# Patient Record
Sex: FEMALE | Race: BLACK OR AFRICAN AMERICAN | Employment: UNEMPLOYED | URBAN - METROPOLITAN AREA
[De-identification: names, ages, dates, MRNs, and addresses within clinical notes are randomized per-mention and may not be internally consistent; named-entity substitution may affect disease eponyms.]

---

## 2018-11-03 ENCOUNTER — APPOINTMENT (OUTPATIENT)
Dept: ULTRASOUND IMAGING | Age: 43
End: 2018-11-03
Payer: COMMERCIAL

## 2018-11-03 ENCOUNTER — HOSPITAL ENCOUNTER (EMERGENCY)
Age: 43
Discharge: HOME | End: 2018-11-03
Payer: COMMERCIAL

## 2018-11-03 VITALS
WEIGHT: 185 LBS | BODY MASS INDEX: 34.04 KG/M2 | TEMPERATURE: 98.9 F | SYSTOLIC BLOOD PRESSURE: 104 MMHG | RESPIRATION RATE: 16 BRPM | DIASTOLIC BLOOD PRESSURE: 53 MMHG | OXYGEN SATURATION: 96 % | HEART RATE: 99 BPM | HEIGHT: 62 IN

## 2018-11-03 DIAGNOSIS — R10.2 PELVIC PAIN IN FEMALE: ICD-10-CM

## 2018-11-03 DIAGNOSIS — D25.9 UTERINE LEIOMYOMA, UNSPECIFIED LOCATION: Primary | ICD-10-CM

## 2018-11-03 LAB
A/G RATIO: 1.3 (ref 1.1–2.2)
ALBUMIN SERPL-MCNC: 4.4 G/DL (ref 3.4–5)
ALP BLD-CCNC: 97 U/L (ref 40–129)
ALT SERPL-CCNC: 12 U/L (ref 10–40)
ANION GAP SERPL CALCULATED.3IONS-SCNC: 9 MMOL/L (ref 3–16)
AST SERPL-CCNC: 17 U/L (ref 15–37)
BACTERIA: ABNORMAL /HPF
BASOPHILS ABSOLUTE: 0.1 K/UL (ref 0–0.2)
BASOPHILS RELATIVE PERCENT: 0.8 %
BILIRUB SERPL-MCNC: 0.4 MG/DL (ref 0–1)
BILIRUBIN URINE: NEGATIVE
BLOOD, URINE: ABNORMAL
BUN BLDV-MCNC: 14 MG/DL (ref 7–20)
CALCIUM SERPL-MCNC: 8.9 MG/DL (ref 8.3–10.6)
CHLORIDE BLD-SCNC: 102 MMOL/L (ref 99–110)
CLARITY: ABNORMAL
CO2: 26 MMOL/L (ref 21–32)
COLOR: YELLOW
CREAT SERPL-MCNC: 0.6 MG/DL (ref 0.6–1.1)
EOSINOPHILS ABSOLUTE: 0.1 K/UL (ref 0–0.6)
EOSINOPHILS RELATIVE PERCENT: 1.4 %
EPITHELIAL CELLS, UA: 7 /HPF (ref 0–5)
GFR AFRICAN AMERICAN: >60
GFR NON-AFRICAN AMERICAN: >60
GLOBULIN: 3.5 G/DL
GLUCOSE BLD-MCNC: 110 MG/DL (ref 70–99)
GLUCOSE URINE: NEGATIVE MG/DL
HCG QUALITATIVE: NEGATIVE
HCG(URINE) PREGNANCY TEST: NEGATIVE
HCT VFR BLD CALC: 33.2 % (ref 36–48)
HEMOGLOBIN: 10.9 G/DL (ref 12–16)
HYALINE CASTS: 1 /LPF (ref 0–8)
KETONES, URINE: NEGATIVE MG/DL
LEUKOCYTE ESTERASE, URINE: ABNORMAL
LIPASE: 16 U/L (ref 13–60)
LYMPHOCYTES ABSOLUTE: 0.8 K/UL (ref 1–5.1)
LYMPHOCYTES RELATIVE PERCENT: 10.9 %
MCH RBC QN AUTO: 30.9 PG (ref 26–34)
MCHC RBC AUTO-ENTMCNC: 32.9 G/DL (ref 31–36)
MCV RBC AUTO: 94.1 FL (ref 80–100)
MICROSCOPIC EXAMINATION: YES
MONOCYTES ABSOLUTE: 0.4 K/UL (ref 0–1.3)
MONOCYTES RELATIVE PERCENT: 5.8 %
NEUTROPHILS ABSOLUTE: 6 K/UL (ref 1.7–7.7)
NEUTROPHILS RELATIVE PERCENT: 81.1 %
NITRITE, URINE: NEGATIVE
PDW BLD-RTO: 15.1 % (ref 12.4–15.4)
PH UA: 7
PLATELET # BLD: 251 K/UL (ref 135–450)
PMV BLD AUTO: 8 FL (ref 5–10.5)
POTASSIUM SERPL-SCNC: 3.7 MMOL/L (ref 3.5–5.1)
PROTEIN UA: NEGATIVE MG/DL
RBC # BLD: 3.53 M/UL (ref 4–5.2)
RBC UA: 5 /HPF (ref 0–4)
SODIUM BLD-SCNC: 137 MMOL/L (ref 136–145)
SPECIFIC GRAVITY UA: 1.02
TOTAL PROTEIN: 7.9 G/DL (ref 6.4–8.2)
URINE REFLEX TO CULTURE: YES
URINE TYPE: ABNORMAL
UROBILINOGEN, URINE: 0.2 E.U./DL
WBC # BLD: 7.4 K/UL (ref 4–11)
WBC UA: 4 /HPF (ref 0–5)

## 2018-11-03 PROCEDURE — 96375 TX/PRO/DX INJ NEW DRUG ADDON: CPT

## 2018-11-03 PROCEDURE — 84703 CHORIONIC GONADOTROPIN ASSAY: CPT

## 2018-11-03 PROCEDURE — 93975 VASCULAR STUDY: CPT

## 2018-11-03 PROCEDURE — 87086 URINE CULTURE/COLONY COUNT: CPT

## 2018-11-03 PROCEDURE — 76830 TRANSVAGINAL US NON-OB: CPT

## 2018-11-03 PROCEDURE — 99284 EMERGENCY DEPT VISIT MOD MDM: CPT

## 2018-11-03 PROCEDURE — 85025 COMPLETE CBC W/AUTO DIFF WBC: CPT

## 2018-11-03 PROCEDURE — 6360000002 HC RX W HCPCS: Performed by: PHYSICIAN ASSISTANT

## 2018-11-03 PROCEDURE — 96374 THER/PROPH/DIAG INJ IV PUSH: CPT

## 2018-11-03 PROCEDURE — 81001 URINALYSIS AUTO W/SCOPE: CPT

## 2018-11-03 PROCEDURE — 83690 ASSAY OF LIPASE: CPT

## 2018-11-03 PROCEDURE — 80053 COMPREHEN METABOLIC PANEL: CPT

## 2018-11-03 RX ORDER — ONDANSETRON 4 MG/1
4-8 TABLET, ORALLY DISINTEGRATING ORAL EVERY 12 HOURS PRN
Qty: 12 TABLET | Refills: 0 | Status: SHIPPED | OUTPATIENT
Start: 2018-11-03 | End: 2021-06-01

## 2018-11-03 RX ORDER — MORPHINE SULFATE 4 MG/ML
4 INJECTION, SOLUTION INTRAMUSCULAR; INTRAVENOUS ONCE
Status: COMPLETED | OUTPATIENT
Start: 2018-11-03 | End: 2018-11-03

## 2018-11-03 RX ORDER — ONDANSETRON 2 MG/ML
4 INJECTION INTRAMUSCULAR; INTRAVENOUS ONCE
Status: COMPLETED | OUTPATIENT
Start: 2018-11-03 | End: 2018-11-03

## 2018-11-03 RX ORDER — DICYCLOMINE HYDROCHLORIDE 10 MG/1
10 CAPSULE ORAL EVERY 6 HOURS PRN
Qty: 20 CAPSULE | Refills: 0 | Status: SHIPPED | OUTPATIENT
Start: 2018-11-03 | End: 2021-06-01

## 2018-11-03 RX ADMIN — MORPHINE SULFATE 4 MG: 4 INJECTION INTRAVENOUS at 03:41

## 2018-11-03 RX ADMIN — ONDANSETRON 4 MG: 2 INJECTION INTRAMUSCULAR; INTRAVENOUS at 03:41

## 2018-11-03 ASSESSMENT — ENCOUNTER SYMPTOMS
DIARRHEA: 0
CONSTIPATION: 0
SHORTNESS OF BREATH: 0
VOMITING: 0
ABDOMINAL PAIN: 1
NAUSEA: 1
SORE THROAT: 0
RHINORRHEA: 0
BLOOD IN STOOL: 0

## 2018-11-03 ASSESSMENT — PAIN SCALES - GENERAL
PAINLEVEL_OUTOF10: 7
PAINLEVEL_OUTOF10: 8

## 2018-11-03 NOTE — ED PROVIDER NOTES
All other systems reviewed and are negative. Positives and Pertinent negatives as per HPI. Except as noted above in the ROS, all other systems were reviewed and negative. PAST MEDICAL HISTORY     Past Medical History:   Diagnosis Date    Anemia     Asthma     no attack since teenager no meds         SURGICAL HISTORY       Past Surgical History:   Procedure Laterality Date     SECTION, LOW TRANSVERSE N/A     EYE SURGERY      TUBAL LIGATION      reversal     TUBAL LIGATION      TL reversed in          CURRENT MEDICATIONS       Previous Medications    FERROUS GLUCONATE (FERGON) 324 (38 FE) MG TABLET    Take 324 mg by mouth daily (with breakfast). IBUPROFEN (ADVIL;MOTRIN) 800 MG TABLET    Take 1 tablet by mouth every 6 hours as needed for Pain. NAPROXEN (NAPROSYN) 500 MG TABLET    Take 1 tablet by mouth 2 times daily for 20 doses    PRENATAL VIT-FE FUMARATE-FA (PRENATAL 1 PLUS 1) 65-1 MG TABS    Take  by mouth.            ALLERGIES     Shellfish-derived products and Penicillins    FAMILY HISTORY       Family History   Problem Relation Age of Onset    Early Death Mother     High Blood Pressure Mother     Substance Abuse Mother     Cancer Father     Substance Abuse Father           SOCIAL HISTORY       Social History     Social History    Marital status:      Spouse name: N/A    Number of children: N/A    Years of education: N/A     Social History Main Topics    Smoking status: Never Smoker    Smokeless tobacco: Never Used    Alcohol use No    Drug use: No    Sexual activity: Yes     Partners: Male     Other Topics Concern    None     Social History Narrative    None       SCREENINGS             PHYSICAL EXAM  (up to 7 for level 4, 8 or more for level 5)     ED Triage Vitals [18 0142]   BP Temp Temp Source Pulse Resp SpO2 Height Weight   (!) 149/98 98.9 °F (37.2 °C) Oral 101 18 100 % 5' 2\" (1.575 m) 185 lb (83.9 kg)       Physical Exam

## 2018-11-04 LAB — URINE CULTURE, ROUTINE: NORMAL

## 2021-04-21 LAB
HPV COMMENT: NORMAL
HPV TYPE 16: NOT DETECTED
HPV TYPE 18: NOT DETECTED
HPVOH (OTHER TYPES): NOT DETECTED

## 2021-05-14 ENCOUNTER — APPOINTMENT (OUTPATIENT)
Dept: CT IMAGING | Age: 46
End: 2021-05-14
Payer: COMMERCIAL

## 2021-05-14 ENCOUNTER — HOSPITAL ENCOUNTER (OUTPATIENT)
Age: 46
Setting detail: OBSERVATION
Discharge: HOME | End: 2021-05-16
Attending: EMERGENCY MEDICINE | Admitting: INTERNAL MEDICINE
Payer: COMMERCIAL

## 2021-05-14 DIAGNOSIS — R06.00 DYSPNEA, UNSPECIFIED TYPE: ICD-10-CM

## 2021-05-14 DIAGNOSIS — I16.0 HYPERTENSIVE URGENCY: Primary | ICD-10-CM

## 2021-05-14 DIAGNOSIS — E87.6 HYPOKALEMIA: ICD-10-CM

## 2021-05-14 DIAGNOSIS — R93.89 ABNORMAL CT OF THE CHEST: ICD-10-CM

## 2021-05-14 DIAGNOSIS — E83.42 HYPOMAGNESEMIA: ICD-10-CM

## 2021-05-14 LAB
A/G RATIO: 1.6 (ref 1.1–2.2)
ALBUMIN SERPL-MCNC: 4.3 G/DL (ref 3.4–5)
ALP BLD-CCNC: 88 U/L (ref 40–129)
ALT SERPL-CCNC: 16 U/L (ref 10–40)
ANION GAP SERPL CALCULATED.3IONS-SCNC: 13 MMOL/L (ref 3–16)
AST SERPL-CCNC: 22 U/L (ref 15–37)
BASOPHILS ABSOLUTE: 0 K/UL (ref 0–0.2)
BASOPHILS RELATIVE PERCENT: 0.9 %
BILIRUB SERPL-MCNC: 0.5 MG/DL (ref 0–1)
BUN BLDV-MCNC: 12 MG/DL (ref 7–20)
CALCIUM SERPL-MCNC: 8.9 MG/DL (ref 8.3–10.6)
CHLORIDE BLD-SCNC: 100 MMOL/L (ref 99–110)
CO2: 24 MMOL/L (ref 21–32)
CREAT SERPL-MCNC: 0.7 MG/DL (ref 0.6–1.1)
EOSINOPHILS ABSOLUTE: 0.1 K/UL (ref 0–0.6)
EOSINOPHILS RELATIVE PERCENT: 1.3 %
GFR AFRICAN AMERICAN: >60
GFR NON-AFRICAN AMERICAN: >60
GLOBULIN: 2.7 G/DL
GLUCOSE BLD-MCNC: 107 MG/DL (ref 70–99)
HCT VFR BLD CALC: 35 % (ref 36–48)
HEMOGLOBIN: 11.5 G/DL (ref 12–16)
LACTIC ACID, SEPSIS: 1.3 MMOL/L (ref 0.4–1.9)
LYMPHOCYTES ABSOLUTE: 1 K/UL (ref 1–5.1)
LYMPHOCYTES RELATIVE PERCENT: 23.7 %
MAGNESIUM: 1.6 MG/DL (ref 1.8–2.4)
MCH RBC QN AUTO: 32.6 PG (ref 26–34)
MCHC RBC AUTO-ENTMCNC: 32.9 G/DL (ref 31–36)
MCV RBC AUTO: 98.9 FL (ref 80–100)
MONOCYTES ABSOLUTE: 0.3 K/UL (ref 0–1.3)
MONOCYTES RELATIVE PERCENT: 6.2 %
NEUTROPHILS ABSOLUTE: 2.8 K/UL (ref 1.7–7.7)
NEUTROPHILS RELATIVE PERCENT: 67.9 %
PDW BLD-RTO: 15.4 % (ref 12.4–15.4)
PLATELET # BLD: 264 K/UL (ref 135–450)
PMV BLD AUTO: 8.9 FL (ref 5–10.5)
POTASSIUM REFLEX MAGNESIUM: 2.6 MMOL/L (ref 3.5–5.1)
PRO-BNP: 1206 PG/ML (ref 0–124)
RBC # BLD: 3.54 M/UL (ref 4–5.2)
SODIUM BLD-SCNC: 137 MMOL/L (ref 136–145)
TOTAL PROTEIN: 7 G/DL (ref 6.4–8.2)
TROPONIN: <0.01 NG/ML
WBC # BLD: 4.1 K/UL (ref 4–11)

## 2021-05-14 PROCEDURE — 93005 ELECTROCARDIOGRAM TRACING: CPT | Performed by: EMERGENCY MEDICINE

## 2021-05-14 PROCEDURE — 36415 COLL VENOUS BLD VENIPUNCTURE: CPT

## 2021-05-14 PROCEDURE — 6360000002 HC RX W HCPCS: Performed by: EMERGENCY MEDICINE

## 2021-05-14 PROCEDURE — 6370000000 HC RX 637 (ALT 250 FOR IP): Performed by: PHYSICIAN ASSISTANT

## 2021-05-14 PROCEDURE — 94640 AIRWAY INHALATION TREATMENT: CPT

## 2021-05-14 PROCEDURE — 83735 ASSAY OF MAGNESIUM: CPT

## 2021-05-14 PROCEDURE — 6360000002 HC RX W HCPCS: Performed by: PHYSICIAN ASSISTANT

## 2021-05-14 PROCEDURE — 6360000004 HC RX CONTRAST MEDICATION: Performed by: PHYSICIAN ASSISTANT

## 2021-05-14 PROCEDURE — 96365 THER/PROPH/DIAG IV INF INIT: CPT

## 2021-05-14 PROCEDURE — 2500000003 HC RX 250 WO HCPCS: Performed by: EMERGENCY MEDICINE

## 2021-05-14 PROCEDURE — 80053 COMPREHEN METABOLIC PANEL: CPT

## 2021-05-14 PROCEDURE — 83605 ASSAY OF LACTIC ACID: CPT

## 2021-05-14 PROCEDURE — 71260 CT THORAX DX C+: CPT

## 2021-05-14 PROCEDURE — 96375 TX/PRO/DX INJ NEW DRUG ADDON: CPT

## 2021-05-14 PROCEDURE — 85025 COMPLETE CBC W/AUTO DIFF WBC: CPT

## 2021-05-14 PROCEDURE — 70450 CT HEAD/BRAIN W/O DYE: CPT

## 2021-05-14 PROCEDURE — 83880 ASSAY OF NATRIURETIC PEPTIDE: CPT

## 2021-05-14 PROCEDURE — 84484 ASSAY OF TROPONIN QUANT: CPT

## 2021-05-14 PROCEDURE — 99283 EMERGENCY DEPT VISIT LOW MDM: CPT

## 2021-05-14 PROCEDURE — 70496 CT ANGIOGRAPHY HEAD: CPT

## 2021-05-14 PROCEDURE — 84145 PROCALCITONIN (PCT): CPT

## 2021-05-14 RX ORDER — LORAZEPAM 2 MG/ML
1 INJECTION INTRAMUSCULAR ONCE
Status: COMPLETED | OUTPATIENT
Start: 2021-05-14 | End: 2021-05-14

## 2021-05-14 RX ORDER — LABETALOL HYDROCHLORIDE 5 MG/ML
10 INJECTION, SOLUTION INTRAVENOUS ONCE
Status: COMPLETED | OUTPATIENT
Start: 2021-05-14 | End: 2021-05-14

## 2021-05-14 RX ORDER — POTASSIUM CHLORIDE 20 MEQ/1
40 TABLET, EXTENDED RELEASE ORAL 2 TIMES DAILY WITH MEALS
Status: DISCONTINUED | OUTPATIENT
Start: 2021-05-15 | End: 2021-05-14

## 2021-05-14 RX ORDER — LEVALBUTEROL INHALATION SOLUTION 1.25 MG/3ML
1.25 SOLUTION RESPIRATORY (INHALATION) EVERY 8 HOURS PRN
Status: DISCONTINUED | OUTPATIENT
Start: 2021-05-14 | End: 2021-05-16 | Stop reason: HOSPADM

## 2021-05-14 RX ORDER — SODIUM CHLORIDE 9 MG/ML
INJECTION, SOLUTION INTRAVENOUS
Status: DISPENSED
Start: 2021-05-14 | End: 2021-05-15

## 2021-05-14 RX ORDER — IPRATROPIUM BROMIDE AND ALBUTEROL SULFATE 2.5; .5 MG/3ML; MG/3ML
1 SOLUTION RESPIRATORY (INHALATION) ONCE
Status: DISCONTINUED | OUTPATIENT
Start: 2021-05-14 | End: 2021-05-14

## 2021-05-14 RX ORDER — POTASSIUM CHLORIDE 7.45 MG/ML
10 INJECTION INTRAVENOUS ONCE
Status: COMPLETED | OUTPATIENT
Start: 2021-05-14 | End: 2021-05-15

## 2021-05-14 RX ORDER — POTASSIUM CHLORIDE 20 MEQ/1
40 TABLET, EXTENDED RELEASE ORAL ONCE
Status: DISCONTINUED | OUTPATIENT
Start: 2021-05-14 | End: 2021-05-16 | Stop reason: HOSPADM

## 2021-05-14 RX ADMIN — LEVALBUTEROL HYDROCHLORIDE 1.25 MG: 1.25 SOLUTION RESPIRATORY (INHALATION) at 20:31

## 2021-05-14 RX ADMIN — LABETALOL HYDROCHLORIDE 10 MG: 5 INJECTION INTRAVENOUS at 23:38

## 2021-05-14 RX ADMIN — POTASSIUM CHLORIDE 10 MEQ: 7.46 INJECTION, SOLUTION INTRAVENOUS at 22:38

## 2021-05-14 RX ADMIN — LORAZEPAM 1 MG: 2 INJECTION INTRAMUSCULAR; INTRAVENOUS at 20:42

## 2021-05-14 RX ADMIN — IOPAMIDOL 120 ML: 755 INJECTION, SOLUTION INTRAVENOUS at 21:26

## 2021-05-14 ASSESSMENT — ENCOUNTER SYMPTOMS
ABDOMINAL PAIN: 0
NAUSEA: 0
SHORTNESS OF BREATH: 1
VOMITING: 0

## 2021-05-14 NOTE — Clinical Note
Patient Class: Inpatient [101]   REQUIRED: Diagnosis: Acute respiratory distress [434187]   Estimated Length of Stay: Estimated stay of more than 2 midnights   Telemetry/Cardiac Monitoring Required?: Yes

## 2021-05-14 NOTE — ED NOTES
Bed: 17  Expected date:   Expected time:   Means of arrival:   Comments:  [address], [facility]  05/14/21 1958

## 2021-05-15 PROBLEM — R06.03 ACUTE RESPIRATORY DISTRESS: Status: ACTIVE | Noted: 2021-05-15

## 2021-05-15 LAB
ALBUMIN SERPL-MCNC: 4.2 G/DL (ref 3.4–5)
ANION GAP SERPL CALCULATED.3IONS-SCNC: 13 MMOL/L (ref 3–16)
BUN BLDV-MCNC: 8 MG/DL (ref 7–20)
CALCIUM SERPL-MCNC: 9.5 MG/DL (ref 8.3–10.6)
CHLORIDE BLD-SCNC: 99 MMOL/L (ref 99–110)
CO2: 24 MMOL/L (ref 21–32)
CREAT SERPL-MCNC: 0.6 MG/DL (ref 0.6–1.1)
EKG ATRIAL RATE: 130 BPM
EKG DIAGNOSIS: NORMAL
EKG P-R INTERVAL: 104 MS
EKG Q-T INTERVAL: 398 MS
EKG QRS DURATION: 86 MS
EKG QTC CALCULATION (BAZETT): 585 MS
EKG R AXIS: -13 DEGREES
EKG T AXIS: 35 DEGREES
EKG VENTRICULAR RATE: 130 BPM
GFR AFRICAN AMERICAN: >60
GFR NON-AFRICAN AMERICAN: >60
GLUCOSE BLD-MCNC: 168 MG/DL (ref 70–99)
MAGNESIUM: 2.1 MG/DL (ref 1.8–2.4)
PHOSPHORUS: 2.4 MG/DL (ref 2.5–4.9)
POTASSIUM SERPL-SCNC: 3.7 MMOL/L (ref 3.5–5.1)
PROCALCITONIN: 0.04 NG/ML (ref 0–0.15)
SODIUM BLD-SCNC: 136 MMOL/L (ref 136–145)

## 2021-05-15 PROCEDURE — 96366 THER/PROPH/DIAG IV INF ADDON: CPT

## 2021-05-15 PROCEDURE — G0378 HOSPITAL OBSERVATION PER HR: HCPCS

## 2021-05-15 PROCEDURE — 36415 COLL VENOUS BLD VENIPUNCTURE: CPT

## 2021-05-15 PROCEDURE — 6370000000 HC RX 637 (ALT 250 FOR IP): Performed by: INTERNAL MEDICINE

## 2021-05-15 PROCEDURE — 96372 THER/PROPH/DIAG INJ SC/IM: CPT

## 2021-05-15 PROCEDURE — 83735 ASSAY OF MAGNESIUM: CPT

## 2021-05-15 PROCEDURE — U0005 INFEC AGEN DETEC AMPLI PROBE: HCPCS

## 2021-05-15 PROCEDURE — 6370000000 HC RX 637 (ALT 250 FOR IP): Performed by: NURSE PRACTITIONER

## 2021-05-15 PROCEDURE — 93010 ELECTROCARDIOGRAM REPORT: CPT | Performed by: INTERNAL MEDICINE

## 2021-05-15 PROCEDURE — 6370000000 HC RX 637 (ALT 250 FOR IP): Performed by: PHYSICIAN ASSISTANT

## 2021-05-15 PROCEDURE — 96376 TX/PRO/DX INJ SAME DRUG ADON: CPT

## 2021-05-15 PROCEDURE — 94761 N-INVAS EAR/PLS OXIMETRY MLT: CPT

## 2021-05-15 PROCEDURE — 6360000002 HC RX W HCPCS: Performed by: INTERNAL MEDICINE

## 2021-05-15 PROCEDURE — U0003 INFECTIOUS AGENT DETECTION BY NUCLEIC ACID (DNA OR RNA); SEVERE ACUTE RESPIRATORY SYNDROME CORONAVIRUS 2 (SARS-COV-2) (CORONAVIRUS DISEASE [COVID-19]), AMPLIFIED PROBE TECHNIQUE, MAKING USE OF HIGH THROUGHPUT TECHNOLOGIES AS DESCRIBED BY CMS-2020-01-R: HCPCS

## 2021-05-15 PROCEDURE — 96367 TX/PROPH/DG ADDL SEQ IV INF: CPT

## 2021-05-15 PROCEDURE — 94640 AIRWAY INHALATION TREATMENT: CPT

## 2021-05-15 PROCEDURE — 2580000003 HC RX 258: Performed by: INTERNAL MEDICINE

## 2021-05-15 PROCEDURE — 80069 RENAL FUNCTION PANEL: CPT

## 2021-05-15 PROCEDURE — 96375 TX/PRO/DX INJ NEW DRUG ADDON: CPT

## 2021-05-15 RX ORDER — MECLIZINE HCL 12.5 MG/1
25 TABLET ORAL 3 TIMES DAILY PRN
Status: DISCONTINUED | OUTPATIENT
Start: 2021-05-15 | End: 2021-05-16 | Stop reason: HOSPADM

## 2021-05-15 RX ORDER — ALBUTEROL SULFATE 2.5 MG/3ML
2.5 SOLUTION RESPIRATORY (INHALATION)
Status: DISCONTINUED | OUTPATIENT
Start: 2021-05-15 | End: 2021-05-16

## 2021-05-15 RX ORDER — FERROUS GLUCONATE 324(37.5)
324 TABLET ORAL
Status: DISCONTINUED | OUTPATIENT
Start: 2021-05-15 | End: 2021-05-15

## 2021-05-15 RX ORDER — LANOLIN ALCOHOL/MO/W.PET/CERES
500 CREAM (GRAM) TOPICAL DAILY
Status: DISCONTINUED | OUTPATIENT
Start: 2021-05-15 | End: 2021-05-16 | Stop reason: HOSPADM

## 2021-05-15 RX ORDER — MAGNESIUM SULFATE 1 G/100ML
1000 INJECTION INTRAVENOUS PRN
Status: DISCONTINUED | OUTPATIENT
Start: 2021-05-15 | End: 2021-05-16 | Stop reason: HOSPADM

## 2021-05-15 RX ORDER — ONDANSETRON 2 MG/ML
4 INJECTION INTRAMUSCULAR; INTRAVENOUS EVERY 6 HOURS PRN
Status: CANCELLED | OUTPATIENT
Start: 2021-05-15

## 2021-05-15 RX ORDER — BUPROPION HYDROCHLORIDE 300 MG/1
300 TABLET ORAL EVERY MORNING
COMMUNITY

## 2021-05-15 RX ORDER — PREDNISONE 1 MG/1
5 TABLET ORAL DAILY
Status: ON HOLD | COMMUNITY
End: 2021-05-26 | Stop reason: HOSPADM

## 2021-05-15 RX ORDER — MELOXICAM 15 MG/1
15 TABLET ORAL DAILY
COMMUNITY
End: 2021-07-29 | Stop reason: ALTCHOICE

## 2021-05-15 RX ORDER — POTASSIUM CHLORIDE 20 MEQ/1
40 TABLET, EXTENDED RELEASE ORAL PRN
Status: DISCONTINUED | OUTPATIENT
Start: 2021-05-15 | End: 2021-05-16 | Stop reason: HOSPADM

## 2021-05-15 RX ORDER — ACETAMINOPHEN 650 MG/1
650 SUPPOSITORY RECTAL EVERY 6 HOURS PRN
Status: DISCONTINUED | OUTPATIENT
Start: 2021-05-15 | End: 2021-05-16 | Stop reason: HOSPADM

## 2021-05-15 RX ORDER — HYDROXYCHLOROQUINE SULFATE 200 MG/1
200 TABLET, FILM COATED ORAL 2 TIMES DAILY
COMMUNITY
End: 2021-09-29

## 2021-05-15 RX ORDER — SODIUM CHLORIDE 0.9 % (FLUSH) 0.9 %
5-40 SYRINGE (ML) INJECTION PRN
Status: DISCONTINUED | OUTPATIENT
Start: 2021-05-15 | End: 2021-05-16 | Stop reason: HOSPADM

## 2021-05-15 RX ORDER — LORAZEPAM 0.5 MG/1
0.5 TABLET ORAL EVERY 8 HOURS PRN
COMMUNITY
End: 2021-05-24

## 2021-05-15 RX ORDER — PROMETHAZINE HYDROCHLORIDE 25 MG/1
12.5 TABLET ORAL EVERY 6 HOURS PRN
Status: CANCELLED | OUTPATIENT
Start: 2021-05-15

## 2021-05-15 RX ORDER — LABETALOL HYDROCHLORIDE 5 MG/ML
10 INJECTION, SOLUTION INTRAVENOUS EVERY 4 HOURS PRN
Status: DISCONTINUED | OUTPATIENT
Start: 2021-05-15 | End: 2021-05-15 | Stop reason: SDUPTHER

## 2021-05-15 RX ORDER — METHYLPREDNISOLONE SODIUM SUCCINATE 125 MG/2ML
60 INJECTION, POWDER, LYOPHILIZED, FOR SOLUTION INTRAMUSCULAR; INTRAVENOUS ONCE
Status: COMPLETED | OUTPATIENT
Start: 2021-05-15 | End: 2021-05-15

## 2021-05-15 RX ORDER — DICYCLOMINE HYDROCHLORIDE 10 MG/1
10 CAPSULE ORAL EVERY 6 HOURS PRN
Status: DISCONTINUED | OUTPATIENT
Start: 2021-05-15 | End: 2021-05-16 | Stop reason: HOSPADM

## 2021-05-15 RX ORDER — VITAMIN B COMPLEX
1000 TABLET ORAL DAILY
Status: DISCONTINUED | OUTPATIENT
Start: 2021-05-15 | End: 2021-05-16 | Stop reason: HOSPADM

## 2021-05-15 RX ORDER — MAGNESIUM SULFATE IN WATER 40 MG/ML
2000 INJECTION, SOLUTION INTRAVENOUS ONCE
Status: COMPLETED | OUTPATIENT
Start: 2021-05-15 | End: 2021-05-15

## 2021-05-15 RX ORDER — HYDROXYCHLOROQUINE SULFATE 200 MG/1
200 TABLET, FILM COATED ORAL 2 TIMES DAILY
Status: DISCONTINUED | OUTPATIENT
Start: 2021-05-15 | End: 2021-05-16 | Stop reason: HOSPADM

## 2021-05-15 RX ORDER — POTASSIUM CHLORIDE 7.45 MG/ML
10 INJECTION INTRAVENOUS
Status: DISPENSED | OUTPATIENT
Start: 2021-05-15 | End: 2021-05-15

## 2021-05-15 RX ORDER — ACETAMINOPHEN 325 MG/1
650 TABLET ORAL EVERY 6 HOURS PRN
Status: DISCONTINUED | OUTPATIENT
Start: 2021-05-15 | End: 2021-05-16 | Stop reason: HOSPADM

## 2021-05-15 RX ORDER — MELOXICAM 7.5 MG/1
15 TABLET ORAL DAILY
Status: DISCONTINUED | OUTPATIENT
Start: 2021-05-15 | End: 2021-05-16 | Stop reason: HOSPADM

## 2021-05-15 RX ORDER — SODIUM CHLORIDE 9 MG/ML
25 INJECTION, SOLUTION INTRAVENOUS PRN
Status: DISCONTINUED | OUTPATIENT
Start: 2021-05-15 | End: 2021-05-16 | Stop reason: HOSPADM

## 2021-05-15 RX ORDER — POLYETHYLENE GLYCOL 3350 17 G/17G
17 POWDER, FOR SOLUTION ORAL DAILY PRN
Status: DISCONTINUED | OUTPATIENT
Start: 2021-05-15 | End: 2021-05-16 | Stop reason: HOSPADM

## 2021-05-15 RX ORDER — LORAZEPAM 0.5 MG/1
0.5 TABLET ORAL EVERY 8 HOURS PRN
Status: DISCONTINUED | OUTPATIENT
Start: 2021-05-15 | End: 2021-05-16 | Stop reason: HOSPADM

## 2021-05-15 RX ORDER — LABETALOL HYDROCHLORIDE 5 MG/ML
10 INJECTION, SOLUTION INTRAVENOUS EVERY 4 HOURS PRN
Status: DISCONTINUED | OUTPATIENT
Start: 2021-05-15 | End: 2021-05-16 | Stop reason: HOSPADM

## 2021-05-15 RX ORDER — ALBUTEROL SULFATE 90 UG/1
2 AEROSOL, METERED RESPIRATORY (INHALATION)
Status: DISCONTINUED | OUTPATIENT
Start: 2021-05-15 | End: 2021-05-16 | Stop reason: HOSPADM

## 2021-05-15 RX ORDER — PRENATAL VIT/IRON FUM/FOLIC AC 27MG-0.8MG
1 TABLET ORAL DAILY
Status: DISCONTINUED | OUTPATIENT
Start: 2021-05-15 | End: 2021-05-15

## 2021-05-15 RX ORDER — BUPROPION HYDROCHLORIDE 300 MG/1
300 TABLET ORAL EVERY MORNING
Status: DISCONTINUED | OUTPATIENT
Start: 2021-05-15 | End: 2021-05-16 | Stop reason: HOSPADM

## 2021-05-15 RX ORDER — CHOLECALCIFEROL (VITAMIN D3) 125 MCG
500 CAPSULE ORAL DAILY
COMMUNITY

## 2021-05-15 RX ORDER — IPRATROPIUM BROMIDE AND ALBUTEROL SULFATE 2.5; .5 MG/3ML; MG/3ML
1 SOLUTION RESPIRATORY (INHALATION)
Status: DISCONTINUED | OUTPATIENT
Start: 2021-05-15 | End: 2021-05-15

## 2021-05-15 RX ORDER — POTASSIUM CHLORIDE 7.45 MG/ML
10 INJECTION INTRAVENOUS PRN
Status: DISCONTINUED | OUTPATIENT
Start: 2021-05-15 | End: 2021-05-16 | Stop reason: HOSPADM

## 2021-05-15 RX ORDER — SODIUM CHLORIDE 0.9 % (FLUSH) 0.9 %
5-40 SYRINGE (ML) INJECTION EVERY 12 HOURS SCHEDULED
Status: DISCONTINUED | OUTPATIENT
Start: 2021-05-15 | End: 2021-05-16 | Stop reason: HOSPADM

## 2021-05-15 RX ADMIN — Medication 1000 UNITS: at 13:26

## 2021-05-15 RX ADMIN — Medication 10 ML: at 20:30

## 2021-05-15 RX ADMIN — Medication 2 PUFF: at 21:16

## 2021-05-15 RX ADMIN — Medication 2 PUFF: at 12:57

## 2021-05-15 RX ADMIN — BENZOCAINE AND MENTHOL 1 LOZENGE: 15; 3.6 LOZENGE ORAL at 17:12

## 2021-05-15 RX ADMIN — Medication 10 MEQ: at 03:30

## 2021-05-15 RX ADMIN — METOPROLOL TARTRATE 25 MG: 25 TABLET, FILM COATED ORAL at 08:33

## 2021-05-15 RX ADMIN — MAGNESIUM SULFATE HEPTAHYDRATE 2000 MG: 40 INJECTION, SOLUTION INTRAVENOUS at 00:35

## 2021-05-15 RX ADMIN — POTASSIUM BICARBONATE 40 MEQ: 782 TABLET, EFFERVESCENT ORAL at 00:32

## 2021-05-15 RX ADMIN — FERROUS GLUCONATE TAB 324 MG (37.5 MG ELEMENTAL IRON) 324 MG: 324 (37.5 FE) TAB at 08:33

## 2021-05-15 RX ADMIN — HYDROXYCHLOROQUINE SULFATE 200 MG: 200 TABLET ORAL at 13:27

## 2021-05-15 RX ADMIN — ACETAMINOPHEN 650 MG: 325 TABLET ORAL at 16:20

## 2021-05-15 RX ADMIN — Medication 2 PUFF: at 16:28

## 2021-05-15 RX ADMIN — POTASSIUM CHLORIDE 10 MEQ: 7.46 INJECTION, SOLUTION INTRAVENOUS at 06:10

## 2021-05-15 RX ADMIN — Medication 2 PUFF: at 21:18

## 2021-05-15 RX ADMIN — METOPROLOL TARTRATE 25 MG: 25 TABLET, FILM COATED ORAL at 20:40

## 2021-05-15 RX ADMIN — LORAZEPAM 0.5 MG: 0.5 TABLET ORAL at 20:40

## 2021-05-15 RX ADMIN — MELOXICAM 15 MG: 7.5 TABLET ORAL at 13:26

## 2021-05-15 RX ADMIN — ENOXAPARIN SODIUM 40 MG: 40 INJECTION SUBCUTANEOUS at 08:33

## 2021-05-15 RX ADMIN — Medication 2 PUFF: at 12:58

## 2021-05-15 RX ADMIN — Medication 500 MCG: at 13:27

## 2021-05-15 RX ADMIN — METHYLPREDNISOLONE SODIUM SUCCINATE 60 MG: 125 INJECTION, POWDER, FOR SOLUTION INTRAMUSCULAR; INTRAVENOUS at 00:32

## 2021-05-15 ASSESSMENT — PAIN DESCRIPTION - PROGRESSION
CLINICAL_PROGRESSION: NOT CHANGED

## 2021-05-15 ASSESSMENT — PAIN DESCRIPTION - LOCATION
LOCATION: RIB CAGE
LOCATION: CHEST

## 2021-05-15 ASSESSMENT — PAIN SCALES - GENERAL
PAINLEVEL_OUTOF10: 6
PAINLEVEL_OUTOF10: 4
PAINLEVEL_OUTOF10: 6
PAINLEVEL_OUTOF10: 4

## 2021-05-15 ASSESSMENT — PAIN DESCRIPTION - DESCRIPTORS: DESCRIPTORS: SHARP

## 2021-05-15 ASSESSMENT — PAIN DESCRIPTION - ORIENTATION: ORIENTATION: RIGHT;LEFT

## 2021-05-15 ASSESSMENT — PAIN DESCRIPTION - FREQUENCY: FREQUENCY: INTERMITTENT

## 2021-05-15 NOTE — PROGRESS NOTES
[facility]    5/15/2021 7:49 AM        Name: Francheska Brennan . Admitted: 5/14/2021  Primary Care Provider: Daron Melendrez MD (Tel: [phone])      Subjective: Ilana Oanh Pt admitted with increased shortness of breath. She has never been hypoxic.  sats currently 100% on RA  Had Covid in March with NO respiratory symptoms. Follows with Rheumatology at [facility] for Lupus.   Is prescribed prednisone, however does not take this medication regularly     Reviewed interval ancillary notes    Current Medications  dicyclomine (BENTYL) capsule 10 mg, Q6H PRN  ferrous gluconate 324 (37.5 Fe) MG tablet 324 mg, Daily with breakfast  prenatal vitamin tablet 1 tablet, Daily  labetalol (NORMODYNE;TRANDATE) injection 10 mg, Q4H PRN  potassium chloride (KLOR-CON M) extended release tablet 40 mEq, PRN   Or  potassium bicarb-citric acid (EFFER-K) effervescent tablet 40 mEq, PRN   Or  potassium chloride 10 mEq/100 mL IVPB (Peripheral Line), PRN  magnesium sulfate 1000 mg in dextrose 5% 100 mL IVPB, PRN  sodium phosphate 13.41 mmol in dextrose 5 % 250 mL IVPB, PRN   Or  sodium phosphate 26.85 mmol in dextrose 5 % 250 mL IVPB, PRN  sodium chloride flush 0.9 % injection 5-40 mL, 2 times per day  sodium chloride flush 0.9 % injection 5-40 mL, PRN  0.9 % sodium chloride infusion, PRN  polyethylene glycol (GLYCOLAX) packet 17 g, Daily PRN  enoxaparin (LOVENOX) injection 40 mg, Daily  acetaminophen (TYLENOL) tablet 650 mg, Q6H PRN   Or  acetaminophen (TYLENOL) suppository 650 mg, Q6H PRN  albuterol (PROVENTIL) nebulizer solution 2.5 mg, Q2H PRN  ipratropium-albuterol (DUONEB) nebulizer solution 1 ampule, Q4H WA  meclizine (ANTIVERT) tablet 25 mg, TID PRN  levalbuterol (XOPENEX) nebulizer solution 1.25 mg, Q8H PRN  sodium chloride 0.9 % infusion,   potassium chloride (KLOR-CON M) extended release tablet 40 mEq, Once        Objective:  BP (!) 138/101   Pulse 107   Temp 97 °F (36.1 °C) (Temporal)   Resp 26   Ht 5' 2\" (1.575 m)   Wt 173 lb 6.4 oz (78.7 kg)   LMP 05/09/2021   SpO2 98%   BMI 31.72 kg/m²   No intake or output data in the 24 hours ending 05/15/21 0749   Wt Readings from Last 3 Encounters:   05/15/21 173 lb 6.4 oz (78.7 kg)   11/03/18 185 lb (83.9 kg)       General appearance:  Appears comfortable, but anxious. She is alert and pleasant   Eyes: Sclera clear. Pupils equal.  ENT: Moist oral mucosa. Trachea midline, no adenopathy. Cardiovascular: Regular rhythm, normal S1, S2. No murmur. No edema in lower extremities  Respiratory: Not using accessory muscles. Good inspiratory effort. Clear to auscultation bilaterally, no wheeze or crackles. GI: Abdomen soft, no tenderness, not distended, normal bowel sounds  Musculoskeletal: No cyanosis in digits, neck supple  Neurology: CN 2-12 grossly intact. No speech or motor deficits  Psych: Normal affect. Alert and oriented in time, place and person  Skin: Warm, dry, normal turgor    Labs and Tests:  CBC:   Recent Labs     05/14/21 2044   WBC 4.1   HGB 11.5*        BMP:    Recent Labs     05/14/21  2044 05/15/21  0817    136   K 2.6* 3.7    99   CO2 24 24   BUN 12 8   CREATININE 0.7 0.6   GLUCOSE 107* 168*     Hepatic:   Recent Labs     05/14/21 2044   AST 22   ALT 16   BILITOT 0.5   ALKPHOS 88       Impression:        Prominent central pulmonary vessels which could be due to pulmonary artery   hypertension with no pulmonary emboli identified. Mildly dilated thoracic aorta with no aneurysm or dissection. Hazy reticulonodular and ground-glass opacities scattered throughout both   lungs which probably represents early multisegmental bronchopneumonia.  This   pattern of pneumonia has been described with COVID-19 disease.  Recommend   correlating with lab values and respiratory precautions     Tiny bibasilar pleural effusions.       1. No acute intracranial abnormality.  Unremarkable noncontrast CT of the head   2. No intracranial aneurysm or large vessel occlusion   3. No significant carotid stenosis or vertebral artery abnormality. 4. Small ground-glass densities within both upper lobes which may represent a   mild infectious/inflammatory process. 5. Mild right hilar adenopathy is partially visualized and is likely reactive     Problem List  Active Problems:    Acute respiratory distress  Resolved Problems:    * No resolved hospital problems. *       Assessment & Plan:   1. Dyspnea:  sats are 100 % on RA:  Symptoms may be related to post covid syndrome, lupus or anxiety. CTPA negative for any PE . Pro calcitonin level is normal. No current indication for any antibiotic therapy. 2. HTN:  I have added low dose BB   3. Lupus:  Resume home meds of plaquenil and mobic,  Was seen by rheumatology on 5/5/21 ,  Notes reviewed. 4. Anxiety:  Resume home medications. 5. Anticipate likely d/c home in the next 24 hours if stable.     6. COVID pending       Diet: DIET GENERAL;  Code:Full Code  DVT PPX      ALEXIS Burkett - CNP   5/15/2021 7:49 AM

## 2021-05-15 NOTE — PROGRESS NOTES
4 Eyes Skin Assessment     NAME:  Jess Dow  YOB: 1975  MEDICAL RECORD NUMBER:  [id number]    The patient is being assess for  Admission    I agree that 2 RN's have performed a thorough Head to Toe Skin Assessment on the patient. ALL assessment sites listed below have been assessed. Areas assessed by both nurses:    Head, Face, Ears, Shoulders, Back, Chest, Arms, Elbows, Hands, Sacrum. Buttock, Coccyx, Ischium and Legs. Feet and Heels        Does the Patient have a Wound?  No noted wound(s)       Landon Prevention initiated:  NA   Wound Care Orders initiated:  NA    Pressure Injury (Stage 3,4, Unstageable, DTI, NWPT, and Complex wounds) if present place consult order under [de-identified] NA    New and Established Ostomies if present place consult order under : NA      Nurse 1 eSignature: Electronically signed by Balwinder Morris RN on 5/15/21 at 6:41 AM EDT    **SHARE this note so that the co-signing nurse is able to place an eSignature**    Nurse 2 eSignature: Electronically signed by Florencia Reese RN on 5/15/21 at 10:24 AM EDT

## 2021-05-15 NOTE — ED PROVIDER NOTES
[facility] Emergency Department      Pt Name: Elesa Cushing  MRN: [id number]  [place] 1975  Date of evaluation: 2021  Provider: Billy Jesus MD  I independently performed a history and physical on Elesa Cushing. All diagnostic, treatment, and disposition decisions were made by myself in conjunction with the advanced practice provider. HPI: Elesa Cushing presented with   Chief Complaint   Patient presents with    Shortness of Breath     pt states hasn't been feeling well and suddenly became sob today. hx of lupus and states \"my lungs are not good\". pt having a hard time talking in between breaths during triage. Elesa Cushing has a past medical history of Anemia and Asthma. She has a past surgical history that includes Tubal ligation; Tubal ligation; eye surgery; and  section, low transverse (N/A, ). No current facility-administered medications on file prior to encounter. Current Outpatient Medications on File Prior to Encounter   Medication Sig Dispense Refill    dicyclomine (BENTYL) 10 MG capsule Take 1 capsule by mouth every 6 hours as needed (cramps) 20 capsule 0    ondansetron (ZOFRAN ODT) 4 MG disintegrating tablet Take 1-2 tablets by mouth every 12 hours as needed for Nausea May Sub regular tablet (non-ODT) if insurance does not cover ODT. 12 tablet 0    ibuprofen (ADVIL;MOTRIN) 800 MG tablet Take 1 tablet by mouth every 6 hours as needed for Pain. 60 tablet 0    Prenatal Vit-Fe Fumarate-FA (PRENATAL 1 PLUS 1) 65-1 MG TABS Take  by mouth.  ferrous gluconate (FERGON) 324 (38 FE) MG tablet Take 324 mg by mouth daily (with breakfast).  naproxen (NAPROSYN) 500 MG tablet Take 1 tablet by mouth 2 times daily for 20 doses 20 tablet 0     PHYSICAL EXAM  Vitals: BP (!) 181/120   Pulse 137   Temp 98.1 °F (36.7 °C) (Infrared)   Resp 27   Wt 185 lb (83.9 kg)   LMP 2021   SpO2 98%   BMI 33.84 kg/m²   Constitutional:  39 y.o. [address] - Abnormal; Notable for the following components:    Pro-BNP 1,206 (*)     All other components within normal limits    Narrative:     Tato Hurdole  [employer] tel. [phone],  Chemistry results called to and read back by MEHRAN Ramirez, 05/14/2021  21:17, by Susan Wood  Performed at:  [facility] [address],  [place], [address]   Phone [phone]   MAGNESIUM - Abnormal; Notable for the following components:    Magnesium 1.60 (*)     All other components within normal limits    Narrative:     Tato [place]  [employer] tel. [phone],  Chemistry results called to and read back by MEHRAN Ramirez, 05/14/2021  21:17, by Susan Wood  Performed at:  [facility] [address],  [place], [address]   Phone [phone]   TROPONIN    Narrative:     Sydneyreji [employer] tel. [phone],  Chemistry results called to and read back by MEHRAN Ramirez, 05/14/2021  21:17, by Susan Wood  Performed at:  [facility] [address],  [place], [address]   Phone [phone]   LACTATE, SEPSIS    Narrative:     Performed at:  [facility] [address],  [place], [address]   Phone [phone]   PROCALCITONIN    Narrative:     Performed at:  [facility] [address],  [place], [address]   Phone [phone]:     Plain x-rays were viewed by me:   CT CHEST PULMONARY EMBOLISM W CONTRAST   Final Result   Prominent central pulmonary vessels which could be due to pulmonary artery   hypertension with no pulmonary emboli identified. Mildly dilated thoracic aorta with no aneurysm or dissection. Hazy reticulonodular and ground-glass opacities scattered throughout both   lungs which probably represents early multisegmental bronchopneumonia.   This   pattern of pneumonia has been described with COVID-19 disease. Recommend   correlating with lab values and respiratory precautions      Tiny bibasilar pleural effusions. CTA HEAD NECK W CONTRAST   Final Result   1. No acute intracranial abnormality. Unremarkable noncontrast CT of the head   2. No intracranial aneurysm or large vessel occlusion   3. No significant carotid stenosis or vertebral artery abnormality. 4. Small ground-glass densities within both upper lobes which may represent a   mild infectious/inflammatory process. 5. Mild right hilar adenopathy is partially visualized and is likely reactive         CT HEAD WO CONTRAST   Final Result   1. No acute intracranial abnormality. Unremarkable noncontrast CT of the head   2. No intracranial aneurysm or large vessel occlusion   3. No significant carotid stenosis or vertebral artery abnormality. 4. Small ground-glass densities within both upper lobes which may represent a   mild infectious/inflammatory process. 5. Mild right hilar adenopathy is partially visualized and is likely reactive           EKG:  Read by me in the absence of a cardiologist shows: Sinus tachycardia, rate 130, short IL, QRS duration normal, axis -13 degrees, nonspecific ST-T wave abnormality, prior study not available    CRITICAL CARE:   Total critical care time of 31 minutes (excludes any time for procedures). This includes but not limited to vital sign monitoring, medication, clinical response to medications, interpretation of diagnostics, review of nursing notes, pertinent record review, discussions about patient condition, consultation time, documentation time. Critical care due to the patient's dyspnea, hypertensive urgency, hypokalemia.     Medications administered:  Medications   levalbuterol (XOPENEX) nebulizer solution 1.25 mg (1.25 mg Nebulization Given 5/14/21 2031)   sodium chloride 0.9 % infusion (  Canceled Entry 5/15/21 0019)   potassium chloride (KLOR-CON M) extended release

## 2021-05-15 NOTE — PROGRESS NOTES
Physical Therapy  Snehal Harding    Pt independent in room, spoke with RN, Leonid Figures. Discharge pt at this time. If status changes please consider evaluations for PT/OT.   Thanks,    Tracey Cantu, [address] [facility], DPT [id number]

## 2021-05-15 NOTE — ED PROVIDER NOTES
[address] [facility]        Pt Name: Francheska Brennan  MRN: [id number]  [place] 1975  Date of evaluation: 5/14/2021  Provider: Aldo Schofield PA-C  PCP: Daron Melendrez MD  Note Started: 8:23 PM EDT        I have seen and evaluated this patient with my supervising physician Khadar Cervantes, *. CHIEF COMPLAINT       Chief Complaint   Patient presents with    Shortness of Breath     pt states hasn't been feeling well and suddenly became sob today. hx of lupus and states \"my lungs are not good\". pt having a hard time talking in between breaths during triage. HISTORY OF PRESENT ILLNESS   (Location, Timing/Onset, Context/Setting, Quality, Duration, Modifying Factors, Severity, Associated Signs and Symptoms)  Note limiting factors. Francheska Brennan is a 39 y.o. female patient presents emergency department for evaluation of chest pressure and shortness of breath. Patient states she has felt winded over the past month. Patient states anytime she would talk too much or have any sort of exertion she becomes short of breath however today become suddenly worse. Patient has a history of lupus and takes Plaquenil daily. Patient has no history of reactive airway and does not use any inhalers. Patient states she has pressure-like chest pain and shortness of breath but does not have any nausea, vomiting, diarrhea, back pain. Patient states she also has a headache which she describes as pressure in the back of her head. Patient has a history of migraines however this does not feel like her typical headache. Patient states she has a strong family history of brain aneurysms. She denies any visual changes. Patient states initially she thought the headache was sinus related but does not have any cold or flulike symptoms, nasal congestion.     Nursing Notes were all reviewed and agreed with or any disagreements were addressed in the HPI. REVIEW OF SYSTEMS    (2-9 systems for level 4, 10 or more for level 5)     Review of Systems   Constitutional: Negative for fatigue and fever. HENT: Negative. Eyes: Negative for visual disturbance. Respiratory: Positive for shortness of breath. Cardiovascular: Positive for chest pain. Gastrointestinal: Negative for abdominal pain, nausea and vomiting. Genitourinary: Negative. Musculoskeletal: Negative. Skin: Negative. Neurological: Positive for headaches. Positives and Pertinent negatives as per HPI. Except as noted above in the ROS, all other systems were reviewed and negative. PAST MEDICAL HISTORY     Past Medical History:   Diagnosis Date    Anemia     Asthma     no attack since teenager no meds         SURGICAL HISTORY     Past Surgical History:   Procedure Laterality Date     SECTION, LOW TRANSVERSE N/A     EYE SURGERY      TUBAL LIGATION      reversal     TUBAL LIGATION      TL reversed in          CURRENTMEDICATIONS       Previous Medications    DICYCLOMINE (BENTYL) 10 MG CAPSULE    Take 1 capsule by mouth every 6 hours as needed (cramps)    FERROUS GLUCONATE (FERGON) 324 (38 FE) MG TABLET    Take 324 mg by mouth daily (with breakfast). IBUPROFEN (ADVIL;MOTRIN) 800 MG TABLET    Take 1 tablet by mouth every 6 hours as needed for Pain. NAPROXEN (NAPROSYN) 500 MG TABLET    Take 1 tablet by mouth 2 times daily for 20 doses    ONDANSETRON (ZOFRAN ODT) 4 MG DISINTEGRATING TABLET    Take 1-2 tablets by mouth every 12 hours as needed for Nausea May Sub regular tablet (non-ODT) if insurance does not cover ODT. PRENATAL VIT-FE FUMARATE-FA (PRENATAL 1 PLUS 1) 65-1 MG TABS    Take  by mouth.            ALLERGIES     Shellfish-derived products and Penicillins    FAMILYHISTORY       Family History   Problem Relation Age of Onset    Early Death Mother     High Blood Pressure Mother     Substance Abuse Mother     Cancer Father     Substance Abuse Father           SOCIAL HISTORY       Social History     Tobacco Use    Smoking status: Never Smoker    Smokeless tobacco: Never Used   Substance Use Topics    Alcohol use: No    Drug use: No       SCREENINGS             PHYSICAL EXAM    (up to 7 for level 4, 8 or more for level 5)     ED Triage Vitals [05/14/21 1947]   BP Temp Temp Source Pulse Resp SpO2 Height Weight   (!) 181/120 98.1 °F (36.7 °C) Infrared 137 27 98 % -- 185 lb (83.9 kg)       Physical Exam  Vitals signs and nursing note reviewed. Constitutional:       General: She is in acute distress. Appearance: She is well-developed. She is not ill-appearing, toxic-appearing or diaphoretic. HENT:      Head: Normocephalic and atraumatic. Nose: Nose normal.   Eyes:      General:         Right eye: No discharge. Left eye: No discharge. Neck:      Musculoskeletal: Normal range of motion and neck supple. Cardiovascular:      Rate and Rhythm: Regular rhythm. Tachycardia present. Heart sounds: Normal heart sounds. No murmur. No gallop. Pulmonary:      Effort: Pulmonary effort is normal. Tachypnea present. No respiratory distress. Breath sounds: Normal breath sounds. No decreased air movement or transmitted upper airway sounds. No decreased breath sounds, wheezing, rhonchi or rales. Comments: Only able to speak 1 word at a time. Abdominal:      Palpations: Abdomen is soft. Musculoskeletal: Normal range of motion. Skin:     General: Skin is warm and dry. Capillary Refill: Capillary refill takes less than 2 seconds. Coloration: Skin is not cyanotic or pale. Neurological:      General: No focal deficit present. Mental Status: She is alert and oriented to person, place, and time.    Psychiatric:         Behavior: Behavior normal.         DIAGNOSTIC RESULTS   LABS:    Labs Reviewed   CBC WITH AUTO DIFFERENTIAL - Abnormal; Notable for the following components:       Result Value    RBC 3.54 (*) Hemoglobin 11.5 (*)     Hematocrit 35.0 (*)     All other components within normal limits    Narrative:     Performed at:  [facility]  [address] [facility], [address]   Phone [phone]   COMPREHENSIVE METABOLIC PANEL W/ REFLEX TO MG FOR LOW K - Abnormal; Notable for the following components:    Potassium reflex Magnesium 2.6 (*)     Glucose 107 (*)     All other components within normal limits    Narrative:     Lorenzo Zimmerman  SFERF tel. [phone],  Chemistry results called to and read back by MEHRAN Andre, 05/14/2021  21:17, by Pat Rosen  Performed at:  [facility] [address] [facility],  [place], [address]   Phone 21  - Abnormal; Notable for the following components:    Pro-BNP 1,206 (*)     All other components within normal limits    Narrative:     Lorenzo Zimmerman  SFERF tel. [phone],  Chemistry results called to and read back by MEHRAN Andre, 05/14/2021  21:17, by Pat Rosen  Performed at:  [facility] [address] [facility], [address]   Phone [phone]   MAGNESIUM - Abnormal; Notable for the following components:    Magnesium 1.60 (*)     All other components within normal limits    Narrative:     Lorenzo Zimmerman  SFERF tel. [phone],  Chemistry results called to and read back by MEHRAN Andre, 05/14/2021  21:17, by Pat Rosen  Performed at:  [facility] [address] [facility], [address]   Phone [phone]   TROPONIN    Narrative:     Lorenzo Zimmerman  SFERF tel. [phone],  Chemistry results called to and read back by MEHRAN Andre, 05/14/2021  21:17, by Pat Rosen  Performed at:  [facility] [address] [facility],  [place], [address]   Phone [phone]   LACTATE, SEPSIS    Narrative:     Performed at:  [facility] [address] [facility],  [place], [address]   Phone [phone]   COVID-19   PROCALCITONIN       All other labs were within normal range or not returned as of this dictation. EKG: All EKG's are interpreted by the Emergency Department Physician in the absence of a cardiologist.  Please see their note for interpretation of EKG. RADIOLOGY:   Non-plain film images such as CT, Ultrasound and MRI are read by the radiologist. Plain radiographic images are visualized and preliminarily interpreted by the ED Provider with the below findings:        Interpretation per the Radiologist below, if available at the time of this note:    CT CHEST PULMONARY EMBOLISM W CONTRAST   Final Result   Prominent central pulmonary vessels which could be due to pulmonary artery   hypertension with no pulmonary emboli identified. Mildly dilated thoracic aorta with no aneurysm or dissection. Hazy reticulonodular and ground-glass opacities scattered throughout both   lungs which probably represents early multisegmental bronchopneumonia. This   pattern of pneumonia has been described with COVID-19 disease. Recommend   correlating with lab values and respiratory precautions      Tiny bibasilar pleural effusions. CTA HEAD NECK W CONTRAST   Final Result   1. No acute intracranial abnormality. Unremarkable noncontrast CT of the head   2. No intracranial aneurysm or large vessel occlusion   3. No significant carotid stenosis or vertebral artery abnormality. 4. Small ground-glass densities within both upper lobes which may represent a   mild infectious/inflammatory process. 5. Mild right hilar adenopathy is partially visualized and is likely reactive         CT HEAD WO CONTRAST   Final Result   1. No acute intracranial abnormality. Unremarkable noncontrast CT of the head   2. No intracranial aneurysm or large vessel occlusion   3. No significant carotid stenosis or vertebral artery abnormality.    4. Small ground-glass densities within both upper lobes which may represent a   mild infectious/inflammatory process. 5. Mild right hilar adenopathy is partially visualized and is likely reactive           No results found. PROCEDURES   Unless otherwise noted below, none     Procedures    CRITICAL CARE TIME   There was a high probability of life-threatening clinical deterioration in the patient's condition requiring my urgent intervention. The total critical care time spent while evaluating and treating this patient was at least 30 minutes. This excludes time spent doing separately billable procedures. This includes time at the bedside, data interpretation, medication management, obtaining critical history from collateral sources if the patient is unable to provide it directly, and physician consultation. Specifics of interventions taken and potentially life-threatening diagnostic considerations are listed in the medical decision making.       CONSULTS:  None      EMERGENCY DEPARTMENT COURSE and DIFFERENTIAL DIAGNOSIS/MDM:   Vitals:    Vitals:    05/14/21 2200 05/14/21 2215 05/14/21 2304 05/14/21 2340   BP: (!) 153/104 (!) 151/100 (!) 153/107 (!) 144/96   Pulse: 107 106 112 108   Resp:       Temp:       TempSrc:       SpO2: 90% 93% 99% 94%   Weight:           Patient was given the following medications:  Medications   levalbuterol (XOPENEX) nebulizer solution 1.25 mg (1.25 mg Nebulization Given 5/14/21 2031)   sodium chloride 0.9 % infusion (has no administration in time range)   potassium chloride (KLOR-CON M) extended release tablet 40 mEq (40 mEq Oral Not Given 5/14/21 2337)   potassium bicarb-citric acid (EFFER-K) effervescent tablet 40 mEq (has no administration in time range)   potassium chloride 10 mEq/100 mL IVPB (Peripheral Line) (has no administration in time range)   magnesium sulfate 2000 mg in 50 mL IVPB premix (has no administration in time range)   LORazepam (ATIVAN) injection 1 mg (1 mg Intravenous Given 5/14/21 2042)   iopamidol (ISOVUE-370) 76 % injection 120 mL (120 mLs Intravenous Given 5/14/21 2126)   potassium chloride 10 mEq/100 mL IVPB (Peripheral Line) (10 mEq Intravenous New Bag 5/14/21 2238)   labetalol (NORMODYNE;TRANDATE) injection 10 mg (10 mg Intravenous Given 5/14/21 2338)           Patient presents emergency department for evaluation of shortness of breath. Patient is alert and oriented but unable to speak more than 1 word at a time. Patient was attempted to be given a Xopenex breathing treatment however she was not able to tolerate this. Patient was given Ativan which did calm her down and she is now breathing easier. Even while tachypneic patient was moving air through her lungs appropriately. She did not have any wheeze, rhonchi or rales. Patient states this has been worsening over the past month but got acutely worse today. Patient states she has pressure-like chest pain. Does not radiate. Laboratory evaluation significant for potassium of 2.6. This is replaced with orally and IV. No leukocytosis. Lactic acid is not elevated. BNP is elevated. Troponin is undetectable. CT of the patient's chest shows prominent central pulmonary vasculature which could represent pulmonary artery hypertension. There is also groundglass opacities scattered throughout both lungs that may be early bronchopneumonia. Patient states she had Covid in March but did not have any respiratory symptoms. Patient states she did fully recover from her symptoms. Patient denies any known recent Covid contacts. Covid test is pending. While patient states she is breathing easier after the Ativan she still has the chest pressure. Patient's blood pressure remained elevated and patient states she does not Take any blood pressure medication at home. Patient was given labetalol which she does bring down her blood pressure. She is still tachycardic although she seems very calm in the bed.   Patient was initially stating that she had a posterior headache however this is now gone. CT of the patient's head and CTA of the patient's head and neck showed no acute abnormalities. Patient will be admitted to the hospitalist for further management. Hospitalist to [facility] accepts the patient for admission. FINAL IMPRESSION      1. Hypertensive urgency    2. Dyspnea, unspecified type    3. Abnormal CT of the chest          DISPOSITION/PLAN   DISPOSITION Decision To Admit 05/14/2021 11:31:12 PM      PATIENT REFERREDTO:  No follow-up provider specified.     DISCHARGE MEDICATIONS:  New Prescriptions    No medications on file       DISCONTINUED MEDICATIONS:  Discontinued Medications    No medications on file              (Please note that portions of this note were completed with a voice recognition program.  Efforts were made to edit the dictations but occasionally words are mis-transcribed.)    Feliz Caraballo PA-C (electronically signed)            Feliz Caraballo PA-C  05/15/21 0003

## 2021-05-15 NOTE — PROGRESS NOTES
Patient arrived to the unit at [facility] during system downtime. Patient transported via wheelchair and noted safely transferring self, ambulated to the bathroom then to bed. Patient alert and oriented x4, c/o pain/discomfort with inspiration and shortness of breath. Patient placed on O2 via n/c 2L. Admission assessment completed and documented, see flow sheet.

## 2021-05-15 NOTE — PLAN OF CARE
Problem: Falls - Risk of:  Goal: Will remain free from falls  Description: Will remain free from falls  5/15/2021 1024 by Robles Orlando RN  Outcome: Ongoing  5/15/2021 0535 by Chelsie Pendleton RN  Outcome: Ongoing  Goal: Absence of physical injury  Description: Absence of physical injury  5/15/2021 1024 by Robles Orlando RN  Outcome: Ongoing  5/15/2021 0535 by Chelsie Pendleton RN  Outcome: Ongoing     Problem: Infection:  Goal: Will remain free from infection  Description: Will remain free from infection  5/15/2021 1024 by Robles Orlando RN  Outcome: Ongoing  5/15/2021 0535 by Chelsie Pendleton RN  Outcome: Ongoing     Problem: Safety:  Goal: Free from accidental physical injury  Description: Free from accidental physical injury  5/15/2021 1024 by Robles Orlando RN  Outcome: Ongoing  5/15/2021 0535 by Chelsie Pendleton RN  Outcome: Ongoing  Goal: Free from intentional harm  Description: Free from intentional harm  5/15/2021 1024 by Robles Orlando RN  Outcome: Ongoing  5/15/2021 0535 by Chelsie Pendleton RN  Outcome: Ongoing     Problem: Daily Care:  Goal: Daily care needs are met  Description: Daily care needs are met  5/15/2021 1024 by Robles Orlando RN  Outcome: Ongoing  5/15/2021 0535 by Chelsie Pendleton RN  Outcome: Ongoing     Problem: Pain:  Goal: Patient's pain/discomfort is manageable  Description: Patient's pain/discomfort is manageable  5/15/2021 1024 by Robles Orlando RN  Outcome: Ongoing  5/15/2021 0535 by Chelsie Pendleton RN  Outcome: Ongoing     Problem: Skin Integrity:  Goal: Skin integrity will stabilize  Description: Skin integrity will stabilize  5/15/2021 1024 by Robles Orlando RN  Outcome: Ongoing  5/15/2021 0535 by Chelsie Pendleton RN  Outcome: Ongoing     Problem: Discharge Planning:  Goal: Patients continuum of care needs are met  Description: Patients continuum of care needs are met  5/15/2021 1024 by Robles Orlando RN  Outcome: Ongoing  5/15/2021 0535 by Darby Cox RN  Outcome: Ongoing     Problem: Breathing Pattern - Ineffective:  Goal: Ability to achieve and maintain a regular respiratory rate will improve  Description: Ability to achieve and maintain a regular respiratory rate will improve  5/15/2021 1024 by Negin Wheat RN  Outcome: Ongoing  5/15/2021 0535 by Daryb Cox RN  Outcome: Ongoing     Problem: Airway Clearance - Ineffective  Goal: Achieve or maintain patent airway  5/15/2021 1024 by Negin Wheat RN  Outcome: Ongoing  5/15/2021 0535 by Darby Cox RN  Outcome: Ongoing     Problem: Gas Exchange - Impaired  Goal: Absence of hypoxia  5/15/2021 1024 by Negin Wheat RN  Outcome: Ongoing  5/15/2021 0535 by Darby Cox RN  Outcome: Ongoing  Goal: Promote optimal lung function  5/15/2021 1024 by Negin Wheat RN  Outcome: Ongoing  5/15/2021 0535 by Darby Cox RN  Outcome: Ongoing     Problem: Breathing Pattern - Ineffective  Goal: Ability to achieve and maintain a regular respiratory rate  5/15/2021 1024 by Negin Wheat RN  Outcome: Ongoing  5/15/2021 0535 by Darby Cox RN  Outcome: Ongoing     Problem:  Body Temperature -  Risk of, Imbalanced  Goal: Ability to maintain a body temperature within defined limits  5/15/2021 1024 by Negin Wheat RN  Outcome: Ongoing  5/15/2021 0535 by Darby Cox RN  Outcome: Ongoing  Goal: Will regain or maintain usual level of consciousness  5/15/2021 1024 by Negin Wheat RN  Outcome: Ongoing  5/15/2021 0535 by Darby Cox RN  Outcome: Ongoing  Goal: Complications related to the disease process, condition or treatment will be avoided or minimized  5/15/2021 1024 by Negin Wheat RN  Outcome: Ongoing  5/15/2021 0535 by Darby Cox RN  Outcome: Ongoing     Problem: Isolation Precautions - Risk of Spread of Infection  Goal: Prevent transmission of infection  5/15/2021 1024 by Lina Sharp

## 2021-05-15 NOTE — PROGRESS NOTES
Occupational Therapy  Richarda Mcburney  Pt independent in room, spoke with RN, Thu Pollard. Discharge pt at this time. If status changes please consider evaluations for PT/OT.   Thanks,  Rosita Gamez, OTR/L [id number]  Darryle Dayhoff, [place], [address] [place] [address]

## 2021-05-15 NOTE — H&P
Hospital Medicine History and Physical    5/15/2021    Date of Admission: 5/15/2021    Date of Service: Pt seen/examined on 5/15/2021 and admitted to observation. Assessment/plan:  1. Acute respiratory distress (without hypoxia), resolved. Possible differential diagnosis includes anxiety (symptoms improved following Ativan in the emergency room). Given history of recent COVID-19 infection, could have long haul from COVID pneumonia. Will give IV Solu-Medrol 60 mg x 1. Continue breathing treatments. Monitor pulse oximeter. So far, not hypoxic. Due to abnormal CT-PE protocol with bilateral groundglass opacity, a repeat COVID-19 test has been sent in the emergency room. She appears clinically stable without wheezes on exam at this time. 2. Multiple electrolyte abnormalities, including hypokalemia (potassium 2.6) and hypomagnesemia (magnesium 1.6). Received 80 mEq of p.o. potassium, 10 mEq of IV potassium in the emergency room; will give additional 40 mEq of IV potassium now. Will also give 2 g of magnesium (for magnesium level of 1.6). Recheck potassium and magnesium levels later this morning. Monitor on telemetry. 3. Hypertensive urgency. Could be sympathetic response to #1 and/or anxiety related. Blood pressure has improved with IV labetalol given in the emergency room. Will continue as needed IV labetalol for now. Monitor vitals closely. 4. Other comorbidities: history of asthma, COVID-19 pneumonia (March 2021), lupus. Activities: Up with assist  Prophylaxis: Subcutaneous Lovenox  Code status: Full code    ==========================================================  Chief complaint:  Chief Complaint   Patient presents with    Shortness of Breath     pt states hasn't been feeling well and suddenly became sob today. hx of lupus and states \"my lungs are not good\". pt having a hard time talking in between breaths during triage. History of Presenting Illness:   This is a pleasant 45 y.o. female with history of asthma, COVID-19 pneumonia (2021), lupus, who presents to the emergency room with complaints of chest tightness, shortness of breath, onset last evening after ambulating up the stairs at home. No cough or fever or chills. On evaluation in the emergency room, she was noted to be tachycardic and tachypneic. She received a dose of Ativan with improvement in tachycardia, hypertensive with blood pressure as high as 181/120 (as it is improved down to 144/96 since receiving a dose of IV labetalol in the ER). CT-head was unremarkable for acute pathology. CT-PE protocol obtained which reveals bilateral groundglass opacities concerning for COVID-19 infection. Potassium is 2.6, magnesium is 1.6. Past Medical History:      Diagnosis Date    Anemia     Asthma     no attack since teenager no meds       Past Surgical History:      Procedure Laterality Date     SECTION, LOW TRANSVERSE N/A     EYE SURGERY      TUBAL LIGATION      reversal     TUBAL LIGATION      TL reversed in        Medications (prior to admission):  Prior to Admission medications    Medication Sig Start Date End Date Taking? Authorizing Provider   dicyclomine (BENTYL) 10 MG capsule Take 1 capsule by mouth every 6 hours as needed (cramps) 11/3/18  Yes ALEXIS Lovell CNP   ondansetron (ZOFRAN ODT) 4 MG disintegrating tablet Take 1-2 tablets by mouth every 12 hours as needed for Nausea May Sub regular tablet (non-ODT) if insurance does not cover ODT. 11/3/18  Yes ALEXIS Lovell CNP   ibuprofen (ADVIL;MOTRIN) 800 MG tablet Take 1 tablet by mouth every 6 hours as needed for Pain. 14  Yes Althea Lepe MD   Prenatal Vit-Fe Fumarate-FA (PRENATAL 1 PLUS 1) 65-1 MG TABS Take  by mouth. Yes Historical Provider, MD   ferrous gluconate (FERGON) 324 (38 FE) MG tablet Take 324 mg by mouth daily (with breakfast).      Yes Historical Provider, MD   naproxen (NAPROSYN) 500 MG tablet Take 1 tablet by mouth 2 times daily for 20 doses 10/6/15 10/16/15  Kate Griffin PA-C       Allergy(ies):  Shellfish-derived products and Penicillins    Social History:  TOBACCO:  reports that she has never smoked. She has never used smokeless tobacco.  ETOH:  reports no history of alcohol use. Family History:      Problem Relation Age of Onset    Early Death Mother     High Blood Pressure Mother     Substance Abuse Mother     Cancer Father     Substance Abuse Father        Review of Systems:  Pertinent positives are listed in HPI. At least 10-point ROS reviewed and were negative. Vitals and physical examination:  BP (!) 144/96   Pulse 108   Temp 98.1 °F (36.7 °C) (Infrared)   Resp 30   Wt 185 lb (83.9 kg)   LMP 05/09/2021   SpO2 94%   BMI 33.84 kg/m²   Gen/overall appearance: Not in acute distress. Alert. Oriented x3. Head: Normocephalic, atraumatic  Eyes: EOMI, good acuity  ENT: Oral mucosa moist  Neck: No JVD, thyromegaly  CVS: Nml S1S2, no MRG. RRR. Pulm: Clear bilaterally. No crackles/wheezes  Gastrointestinal: Soft, NT/ND, +BS  Musculoskeletal: No edema. Warm  Neuro: No focal deficit. Moves extremity spontaneously. Psychiatry: Appropriate affect. Not agitated. Skin: Warm, dry with normal turgor.  No rash  Capillary refill: Brisk,< 3 seconds   Peripheral Pulses: +2 palpable, equal bilaterally       Labs/imaging/EKG:  CBC:   Recent Labs     05/14/21 2044   WBC 4.1   HGB 11.5*        BMP:    Recent Labs     05/14/21 2044      K 2.6*      CO2 24   BUN 12   CREATININE 0.7   GLUCOSE 107*     Hepatic:   Recent Labs     05/14/21 2044   AST 22   ALT 16   BILITOT 0.5   ALKPHOS 88       Ct Head Wo Contrast    Result Date: 5/14/2021  EXAMINATION: CTA OF THE HEAD AND NECK WITH CONTRAST; CT OF THE HEAD WITHOUT CONTRAST 5/14/2021 9:29 pm; 5/14/2021 9:24 pm: TECHNIQUE: CTA of the head and neck was performed with the administration of intravenous contrast. differentiation is maintained. No evidence of mass, mass effect or midline shift. No evidence of hydrocephalus. ORBITS: The visualized portion of the orbits demonstrate no acute abnormality. SINUSES:  The visualized paranasal sinuses and mastoid air cells demonstrate no acute abnormality. SOFT TISSUES/SKULL: No acute abnormality of the visualized skull or soft tissues. 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 CTA NECK: AORTIC ARCH/ARCH VESSELS: No dissection or arterial injury. No significant stenosis of the brachiocephalic or subclavian arteries. CAROTID ARTERIES: No dissection, arterial injury, or hemodynamically significant stenosis by NASCET criteria. VERTEBRAL ARTERIES: No dissection, arterial injury, or significant stenosis. SOFT TISSUES: Scattered small ground-glass densities are identified within both upper lobes which may represent a mild infectious/inflammatory process. Mild right hilar adenopathy is identified likely reactive. The larynx and pharynx are unremarkable. No acute abnormality of the salivary and thyroid glands. BONES: No acute osseous abnormality. CTA HEAD: ANTERIOR CIRCULATION: No significant stenosis of the intracranial internal carotid, anterior cerebral, or middle cerebral arteries. No aneurysm. POSTERIOR CIRCULATION: No significant stenosis of the vertebral, basilar, or posterior cerebral arteries. No aneurysm. OTHER: No dural venous sinus thrombosis on this non-dedicated study     1. No acute intracranial abnormality. Unremarkable noncontrast CT of the head 2. No intracranial aneurysm or large vessel occlusion 3. No significant carotid stenosis or vertebral artery abnormality. 4. Small ground-glass densities within both upper lobes which may represent a mild infectious/inflammatory process.  5. Mild right hilar adenopathy is partially visualized and is likely reactive     Ct Chest Pulmonary Embolism W Contrast    Result Date: 5/14/2021  EXAMINATION: CTA OF THE pulmonary vessels which could be due to pulmonary artery hypertension with no pulmonary emboli identified. Mildly dilated thoracic aorta with no aneurysm or dissection. Hazy reticulonodular and ground-glass opacities scattered throughout both lungs which probably represents early multisegmental bronchopneumonia. This pattern of pneumonia has been described with COVID-19 disease. Recommend correlating with lab values and respiratory precautions Tiny bibasilar pleural effusions. Cta Head Neck W Contrast    Result Date: 5/14/2021  EXAMINATION: CTA OF THE HEAD AND NECK WITH CONTRAST; CT OF THE HEAD WITHOUT CONTRAST 5/14/2021 9:29 pm; 5/14/2021 9:24 pm: TECHNIQUE: CTA of the head and neck was performed with the administration of intravenous contrast. Multiplanar reformatted images are provided for review. MIP images are provided for review. Stenosis of the internal carotid arteries measured using NASCET criteria. Dose modulation, iterative reconstruction, and/or weight based adjustment of the mA/kV was utilized to reduce the radiation dose to as low as reasonably achievable.; CT of the head was performed without the administration of intravenous contrast. Dose modulation, iterative reconstruction, and/or weight based adjustment of the mA/kV was utilized to reduce the radiation dose to as low as reasonably achievable. Noncontrast CT of the head with reconstructed 2-D images are also provided for review. COMPARISON: None. HISTORY: ORDERING SYSTEM PROVIDED HISTORY: atypical headache, patient has lupus, family history of brain aneurysms. TECHNOLOGIST PROVIDED HISTORY: Reason for exam:->atypical headache, patient has lupus, family history of brain aneurysms. Decision Support Exception - unselect if not a suspected or confirmed emergency medical condition->Emergency Medical Condition (MA) Reason for Exam: Atypical headache, patient has lupus, family history of brain aneurysms.  Acuity: Acute Type of Exam: Initial; ORDERING SYSTEM PROVIDED HISTORY: posterior head ache (pressure) TECHNOLOGIST PROVIDED HISTORY: Reason for exam:->posterior head ache (pressure) Has a \"code stroke\" or \"stroke alert\" been called? ->No Decision Support Exception - unselect if not a suspected or confirmed emergency medical condition->Emergency Medical Condition (MA) Is the patient pregnant?->No Reason for Exam: Posterior head ache (pressure). Shortness of Breath (pt states hasn't been feeling well and suddenly became sob today. hx of lupus and states \"my lungs are not good\". pt having a hard time talking in between breaths during triage. ). Acuity: Acute Type of Exam: Initial FINDINGS: CT HEAD: BRAIN/VENTRICLES:  No acute intracranial hemorrhage or extraaxial fluid collection. Grey-white differentiation is maintained. No evidence of mass, mass effect or midline shift. No evidence of hydrocephalus. ORBITS: The visualized portion of the orbits demonstrate no acute abnormality. SINUSES:  The visualized paranasal sinuses and mastoid air cells demonstrate no acute abnormality. SOFT TISSUES/SKULL: No acute abnormality of the visualized skull or soft tissues. 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 CTA NECK: AORTIC ARCH/ARCH VESSELS: No dissection or arterial injury. No significant stenosis of the brachiocephalic or subclavian arteries. CAROTID ARTERIES: No dissection, arterial injury, or hemodynamically significant stenosis by NASCET criteria. VERTEBRAL ARTERIES: No dissection, arterial injury, or significant stenosis. SOFT TISSUES: Scattered small ground-glass densities are identified within both upper lobes which may represent a mild infectious/inflammatory process. Mild right hilar adenopathy is identified likely reactive. The larynx and pharynx are unremarkable. No acute abnormality of the salivary and thyroid glands. BONES: No acute osseous abnormality.  CTA HEAD: ANTERIOR CIRCULATION: No significant stenosis of the intracranial internal carotid, anterior cerebral, or middle cerebral arteries. No aneurysm. POSTERIOR CIRCULATION: No significant stenosis of the vertebral, basilar, or posterior cerebral arteries. No aneurysm. OTHER: No dural venous sinus thrombosis on this non-dedicated study     1. No acute intracranial abnormality. Unremarkable noncontrast CT of the head 2. No intracranial aneurysm or large vessel occlusion 3. No significant carotid stenosis or vertebral artery abnormality. 4. Small ground-glass densities within both upper lobes which may represent a mild infectious/inflammatory process. 5. Mild right hilar adenopathy is partially visualized and is likely reactive       EKG: Sinus tachycardia with short MS interval, rate 130 bpm.  No acute ST/T changes. I reviewed EKG. Discussed with ER provider.       Thank you Az Hernandez MD for the opportunity to be involved in this patient's care.    -----------------------------  Juliana Chowdhury MD  [facility]

## 2021-05-15 NOTE — ED NOTES
Upon entrance to room pt states feels like she is having a panic attack, pt taking short breaths and says the words hmm when exhaling, lungs clear bilaterally, Dr. Brooke Thomas notified, see new orders.       [address], RN  05/14/21 2045

## 2021-05-16 VITALS
HEIGHT: 62 IN | HEART RATE: 102 BPM | DIASTOLIC BLOOD PRESSURE: 111 MMHG | WEIGHT: 190.8 LBS | BODY MASS INDEX: 35.11 KG/M2 | RESPIRATION RATE: 18 BRPM | TEMPERATURE: 97.3 F | OXYGEN SATURATION: 100 % | SYSTOLIC BLOOD PRESSURE: 134 MMHG

## 2021-05-16 LAB
A/G RATIO: 1.4 (ref 1.1–2.2)
ALBUMIN SERPL-MCNC: 3.8 G/DL (ref 3.4–5)
ALP BLD-CCNC: 80 U/L (ref 40–129)
ALT SERPL-CCNC: 22 U/L (ref 10–40)
ANION GAP SERPL CALCULATED.3IONS-SCNC: 10 MMOL/L (ref 3–16)
AST SERPL-CCNC: 25 U/L (ref 15–37)
BASOPHILS ABSOLUTE: 0 K/UL (ref 0–0.2)
BASOPHILS RELATIVE PERCENT: 0.9 %
BILIRUB SERPL-MCNC: 0.5 MG/DL (ref 0–1)
BUN BLDV-MCNC: 13 MG/DL (ref 7–20)
CALCIUM SERPL-MCNC: 8.9 MG/DL (ref 8.3–10.6)
CHLORIDE BLD-SCNC: 100 MMOL/L (ref 99–110)
CO2: 25 MMOL/L (ref 21–32)
CREAT SERPL-MCNC: 0.7 MG/DL (ref 0.6–1.1)
EOSINOPHILS ABSOLUTE: 0 K/UL (ref 0–0.6)
EOSINOPHILS RELATIVE PERCENT: 0.4 %
GFR AFRICAN AMERICAN: >60
GFR NON-AFRICAN AMERICAN: >60
GLOBULIN: 2.7 G/DL
GLUCOSE BLD-MCNC: 134 MG/DL (ref 70–99)
HCT VFR BLD CALC: 34.9 % (ref 36–48)
HEMOGLOBIN: 11.5 G/DL (ref 12–16)
LYMPHOCYTES ABSOLUTE: 1.4 K/UL (ref 1–5.1)
LYMPHOCYTES RELATIVE PERCENT: 23.8 %
MAGNESIUM: 1.7 MG/DL (ref 1.8–2.4)
MCH RBC QN AUTO: 33 PG (ref 26–34)
MCHC RBC AUTO-ENTMCNC: 32.9 G/DL (ref 31–36)
MCV RBC AUTO: 100.3 FL (ref 80–100)
MONOCYTES ABSOLUTE: 0.3 K/UL (ref 0–1.3)
MONOCYTES RELATIVE PERCENT: 6 %
NEUTROPHILS ABSOLUTE: 3.9 K/UL (ref 1.7–7.7)
NEUTROPHILS RELATIVE PERCENT: 68.9 %
PDW BLD-RTO: 15.5 % (ref 12.4–15.4)
PHOSPHORUS: 3.3 MG/DL (ref 2.5–4.9)
PLATELET # BLD: 240 K/UL (ref 135–450)
PMV BLD AUTO: 8.7 FL (ref 5–10.5)
POTASSIUM SERPL-SCNC: 3.3 MMOL/L (ref 3.5–5.1)
RBC # BLD: 3.48 M/UL (ref 4–5.2)
SARS-COV-2, PCR: NOT DETECTED
SODIUM BLD-SCNC: 135 MMOL/L (ref 136–145)
TOTAL PROTEIN: 6.5 G/DL (ref 6.4–8.2)
WBC # BLD: 5.7 K/UL (ref 4–11)

## 2021-05-16 PROCEDURE — G0378 HOSPITAL OBSERVATION PER HR: HCPCS

## 2021-05-16 PROCEDURE — 99220 PR INITIAL OBSERVATION CARE/DAY 70 MINUTES: CPT | Performed by: INTERNAL MEDICINE

## 2021-05-16 PROCEDURE — 36415 COLL VENOUS BLD VENIPUNCTURE: CPT

## 2021-05-16 PROCEDURE — 6370000000 HC RX 637 (ALT 250 FOR IP): Performed by: INTERNAL MEDICINE

## 2021-05-16 PROCEDURE — 6360000002 HC RX W HCPCS: Performed by: INTERNAL MEDICINE

## 2021-05-16 PROCEDURE — 94761 N-INVAS EAR/PLS OXIMETRY MLT: CPT

## 2021-05-16 PROCEDURE — 85025 COMPLETE CBC W/AUTO DIFF WBC: CPT

## 2021-05-16 PROCEDURE — 94640 AIRWAY INHALATION TREATMENT: CPT

## 2021-05-16 PROCEDURE — 83735 ASSAY OF MAGNESIUM: CPT

## 2021-05-16 PROCEDURE — 84100 ASSAY OF PHOSPHORUS: CPT

## 2021-05-16 PROCEDURE — 2580000003 HC RX 258: Performed by: INTERNAL MEDICINE

## 2021-05-16 PROCEDURE — 6370000000 HC RX 637 (ALT 250 FOR IP): Performed by: NURSE PRACTITIONER

## 2021-05-16 PROCEDURE — 80053 COMPREHEN METABOLIC PANEL: CPT

## 2021-05-16 PROCEDURE — 96372 THER/PROPH/DIAG INJ SC/IM: CPT

## 2021-05-16 RX ORDER — ALBUTEROL SULFATE 90 UG/1
2 AEROSOL, METERED RESPIRATORY (INHALATION) EVERY 6 HOURS PRN
Qty: 1 INHALER | Refills: 0 | Status: SHIPPED | OUTPATIENT
Start: 2021-05-16

## 2021-05-16 RX ORDER — ALBUTEROL SULFATE 90 UG/1
2 AEROSOL, METERED RESPIRATORY (INHALATION)
Status: DISCONTINUED | OUTPATIENT
Start: 2021-05-16 | End: 2021-05-16 | Stop reason: HOSPADM

## 2021-05-16 RX ADMIN — ACETAMINOPHEN 650 MG: 325 TABLET ORAL at 04:51

## 2021-05-16 RX ADMIN — HYDROXYCHLOROQUINE SULFATE 200 MG: 200 TABLET ORAL at 08:37

## 2021-05-16 RX ADMIN — Medication 10 ML: at 08:56

## 2021-05-16 RX ADMIN — MELOXICAM 15 MG: 7.5 TABLET ORAL at 08:38

## 2021-05-16 RX ADMIN — BUPROPION HYDROCHLORIDE 300 MG: 300 TABLET, EXTENDED RELEASE ORAL at 08:37

## 2021-05-16 RX ADMIN — Medication 1000 UNITS: at 08:37

## 2021-05-16 RX ADMIN — LORAZEPAM 0.5 MG: 0.5 TABLET ORAL at 04:51

## 2021-05-16 RX ADMIN — ALBUTEROL SULFATE 2 PUFF: 90 AEROSOL, METERED RESPIRATORY (INHALATION) at 03:12

## 2021-05-16 RX ADMIN — METOPROLOL TARTRATE 25 MG: 25 TABLET, FILM COATED ORAL at 08:38

## 2021-05-16 RX ADMIN — POTASSIUM CHLORIDE 40 MEQ: 1500 TABLET, EXTENDED RELEASE ORAL at 08:54

## 2021-05-16 RX ADMIN — ENOXAPARIN SODIUM 40 MG: 40 INJECTION SUBCUTANEOUS at 08:36

## 2021-05-16 RX ADMIN — Medication 500 MCG: at 08:37

## 2021-05-16 RX ADMIN — Medication 2 PUFF: at 09:00

## 2021-05-16 ASSESSMENT — PAIN DESCRIPTION - LOCATION: LOCATION: CHEST

## 2021-05-16 ASSESSMENT — PAIN DESCRIPTION - PROGRESSION
CLINICAL_PROGRESSION: NOT CHANGED

## 2021-05-16 ASSESSMENT — PAIN SCALES - GENERAL
PAINLEVEL_OUTOF10: 8
PAINLEVEL_OUTOF10: 0

## 2021-05-16 ASSESSMENT — PAIN DESCRIPTION - FREQUENCY: FREQUENCY: INTERMITTENT

## 2021-05-16 ASSESSMENT — PAIN DESCRIPTION - PAIN TYPE: TYPE: ACUTE PAIN

## 2021-05-16 NOTE — DISCHARGE SUMMARY
[facility]    Patient Demographics    Patient. Joanna Randall  Date of Birth. 1975  MRN. [id number]     Primary care provider. Edu Valenzuela MD  (Tel: [phone])    Admit date: 5/14/2021    Discharge date 5/16/2021  Note Date: 5/16/2021     Reason for Hospitalization. Chief Complaint   Patient presents with    Shortness of Breath     pt states hasn't been feeling well and suddenly became sob today. hx of lupus and states \"my lungs are not good\". pt having a hard time talking in between breaths during triage. Significant Findings. Active Problems:    Acute respiratory distress  Resolved Problems:    * No resolved hospital problems. *       Problems and results from this hospitalization that need follow up. Follow up with PCP  Outpatient echo added     Significant test results and incidental findings. CTPA:    Impression:         Prominent central pulmonary vessels which could be due to pulmonary artery   hypertension with no pulmonary emboli identified. Mildly dilated thoracic aorta with no aneurysm or dissection. Hazy reticulonodular and ground-glass opacities scattered throughout both   lungs which probably represents early multisegmental bronchopneumonia.  This   pattern of pneumonia has been described with COVID-19 disease.  Recommend   correlating with lab values and respiratory precautions     Tiny bibasilar pleural effusions.       1. No acute intracranial abnormality.  Unremarkable noncontrast CT of the head   2. No intracranial aneurysm or large vessel occlusion   3. No significant carotid stenosis or vertebral artery abnormality. 4. Small ground-glass densities within both upper lobes which may represent a   mild infectious/inflammatory process.    5. Mild right hilar adenopathy is partially visualized and is likely reactive       Invasive procedures and treatments. [facility]. Pt admitted through the ED with increased shortness of breath. She was never noted to be hypoxic.  sats were 98 to 100 % on RA. She had Covid in March , however she did not have any respiratory symptoms. She was admitted and followed by pulmonary . She was treated for the followin. Dyspnea:  sats are 100 % on RA:  Symptoms may be related to post covid syndrome, lupus or anxiety. CTPA negative for any PE . Pro calcitonin level is normal. No current indication for any antibiotic therapy. 2. HTN:  I have added low dose BB with good results   3. Lupus:  Resume home meds of plaquenil and mobic,  Was seen by rheumatology on 21 ,  Notes reviewed. 4. Anxiety:  Pt smoke marijuana daily to help with anxiety. Cessation encouraged. 5. COVID was negative  6. Outpatient echo was ordered at d/c . Pt will call to schedule        Consults. IP CONSULT TO CASE MANAGEMENT  IP CONSULT TO PULMONOLOGY    Physical examination on discharge day. BP (!) 134/111   Pulse 102   Temp 97.3 °F (36.3 °C) (Temporal)   Resp 20   Ht 5' 2\" (1.575 m)   Wt 190 lb 12.8 oz (86.5 kg)   LMP 2021   SpO2 100%   BMI 34.90 kg/m²   General appearance. Alert. Looks comfortable, she is anxious   HEENT. Sclera clear. Moist mucus membranes. Cardiovascular. Regular rate and rhythm, normal S1, S2. No murmur. Respiratory. Not using accessory muscles. Clear to auscultation bilaterally, no wheeze. Gastrointestinal. Abdomen soft, non-tender, not distended, normal bowel sounds  Neurology. Facial symmetry. No speech deficits. Moving all extremities equally. Extremities. No edema in lower extremities. Skin. Warm, dry, normal turgor    Condition at time of discharge stable     Medication instructions provided to patient at discharge.      Medication List      START taking these medications    metoprolol tartrate 25 MG tablet  Commonly known as: LOPRESSOR  Take 1 tablet by mouth 2 times daily        CONTINUE taking these medications    buPROPion 300 MG extended release tablet  Commonly known as: WELLBUTRIN XL     dicyclomine 10 MG capsule  Commonly known as: Bentyl  Take 1 capsule by mouth every 6 hours as needed (cramps)     hydroxychloroquine 200 MG tablet  Commonly known as: PLAQUENIL     LORazepam 0.5 MG tablet  Commonly known as: ATIVAN     meloxicam 15 MG tablet  Commonly known as: MOBIC     ondansetron 4 MG disintegrating tablet  Commonly known as: Zofran ODT  Take 1-2 tablets by mouth every 12 hours as needed for Nausea May Sub regular tablet (non-ODT) if insurance does not cover ODT.     predniSONE 5 MG tablet  Commonly known as: DELTASONE     vitamin B-12 500 MCG tablet  Commonly known as: CYANOCOBALAMIN     vitamin D 25 MCG (1000 UT) Tabs tablet  Commonly known as: CHOLECALCIFEROL        STOP taking these medications   Pt was not taking these meds   ferrous gluconate 324 (38 Fe) MG tablet  Commonly known as: FERGON     ibuprofen 800 MG tablet  Commonly known as: ADVIL;MOTRIN     naproxen 500 MG tablet  Commonly known as: Naprosyn     Prenatal 1 Plus 1 65-1 MG Tabs           Where to Get Your Medications      These medications were sent to [address] [place] [address]    Phone: [phone]   · metoprolol tartrate 25 MG tablet         Discharge recommendations given to patient. Follow Up. in 1 week   Disposition. Home   Activity. As tolerated   Diet: DIET GENERAL;      Spent > 30  minutes in discharge process.     Signed:  ALEXIS Murdock CNP     5/16/2021 8:49 AM

## 2021-05-16 NOTE — PROGRESS NOTES
Pt only complaint is increasing dry cough, declines any intervention at this time. Pt encouraged to call out with an  Needs. Call light within reach, will continue to monitor.

## 2021-05-16 NOTE — PLAN OF CARE
Problem: Falls - Risk of:  Goal: Will remain free from falls  Description: Will remain free from falls  5/15/2021 2246 by Betito Brambila RN  Outcome: Ongoing  5/15/2021 1024 by Fidel Diaz RN  Outcome: Ongoing  Goal: Absence of physical injury  Description: Absence of physical injury  5/15/2021 2246 by Betito Brambila RN  Outcome: Ongoing  5/15/2021 1024 by Fidel Diaz RN  Outcome: Ongoing     Problem: Infection:  Goal: Will remain free from infection  Description: Will remain free from infection  5/15/2021 2246 by Betito Brambila RN  Outcome: Ongoing  5/15/2021 1024 by Fidel Diaz RN  Outcome: Ongoing     Problem: Safety:  Goal: Free from accidental physical injury  Description: Free from accidental physical injury  5/15/2021 2246 by Betito Brambila RN  Outcome: Ongoing  5/15/2021 1024 by Fidel Diaz RN  Outcome: Ongoing  Goal: Free from intentional harm  Description: Free from intentional harm  5/15/2021 2246 by Betito Brambila RN  Outcome: Ongoing  5/15/2021 1024 by Fidel Diaz RN  Outcome: Ongoing     Problem: Daily Care:  Goal: Daily care needs are met  Description: Daily care needs are met  5/15/2021 2246 by Betito Brambila RN  Outcome: Ongoing  5/15/2021 1024 by Fidel Diaz RN  Outcome: Ongoing     Problem: Pain:  Goal: Patient's pain/discomfort is manageable  Description: Patient's pain/discomfort is manageable  5/15/2021 2246 by Betito Brambila RN  Outcome: Ongoing  5/15/2021 1024 by Fidel Diaz RN  Outcome: Ongoing  Goal: Pain level will decrease  Description: Pain level will decrease  Outcome: Ongoing  Goal: Control of acute pain  Description: Control of acute pain  Outcome: Ongoing  Goal: Control of chronic pain  Description: Control of chronic pain  Outcome: Ongoing     Problem: Skin Integrity:  Goal: Skin integrity will stabilize  Description: Skin integrity will stabilize  5/15/2021 2246 by Betito Brambila RN  Outcome: Ongoing  5/15/2021 1024 by Debra Tyson RN  Outcome: Ongoing     Problem: Discharge Planning:  Goal: Patients continuum of care needs are met  Description: Patients continuum of care needs are met  5/15/2021 2246 by Addison Schaumann, RN  Outcome: Ongoing  5/15/2021 1024 by Debra Tyson RN  Outcome: Ongoing     Problem: Breathing Pattern - Ineffective:  Goal: Ability to achieve and maintain a regular respiratory rate will improve  Description: Ability to achieve and maintain a regular respiratory rate will improve  5/15/2021 2246 by Addison Schaumann, RN  Outcome: Ongoing  5/15/2021 1024 by Debra Tyson RN  Outcome: Ongoing     Problem: Airway Clearance - Ineffective  Goal: Achieve or maintain patent airway  5/15/2021 2246 by Addison Schaumann, RN  Outcome: Ongoing  5/15/2021 1024 by Debra Tyson RN  Outcome: Ongoing     Problem: Gas Exchange - Impaired  Goal: Absence of hypoxia  5/15/2021 2246 by Addison Schaumann, RN  Outcome: Ongoing  5/15/2021 1024 by Debra Tyson RN  Outcome: Ongoing  Goal: Promote optimal lung function  5/15/2021 2246 by Addison Schaumann, RN  Outcome: Ongoing  5/15/2021 1024 by Debra Tyson RN  Outcome: Ongoing     Problem: Breathing Pattern - Ineffective  Goal: Ability to achieve and maintain a regular respiratory rate  5/15/2021 2246 by Addison Schaumann, RN  Outcome: Ongoing  5/15/2021 1024 by Debra Tyson RN  Outcome: Ongoing     Problem:  Body Temperature -  Risk of, Imbalanced  Goal: Ability to maintain a body temperature within defined limits  5/15/2021 2246 by Addison Schaumann, RN  Outcome: Ongoing  5/15/2021 1024 by Debra Tyson RN  Outcome: Ongoing  Goal: Will regain or maintain usual level of consciousness  5/15/2021 2246 by Addison Schaumann, RN  Outcome: Ongoing  5/15/2021 1024 by Debra Tyson RN  Outcome: Ongoing  Goal: Complications related to the disease process, condition or treatment will be avoided or minimized  5/15/2021 2246 by Afsaneh Cummings RN  Outcome: Ongoing  5/15/2021 1024 by Kajal Moore RN  Outcome: Ongoing     Problem: Isolation Precautions - Risk of Spread of Infection  Goal: Prevent transmission of infection  5/15/2021 2246 by Afsaneh Cummings RN  Outcome: Ongoing  5/15/2021 1024 by Kajal Moore RN  Outcome: Ongoing     Problem: Nutrition Deficits  Goal: Optimize nutritional status  5/15/2021 2246 by Afsaneh Cummings RN  Outcome: Ongoing  5/15/2021 1024 by Kajal Moore RN  Outcome: Ongoing     Problem: Risk for Fluid Volume Deficit  Goal: Maintain normal heart rhythm  5/15/2021 2246 by Afsaneh Cummings RN  Outcome: Ongoing  5/15/2021 1024 by Kajal Moore RN  Outcome: Ongoing  Goal: Maintain absence of muscle cramping  5/15/2021 2246 by Afsaneh Cummings RN  Outcome: Ongoing  5/15/2021 1024 by Kajal Moore RN  Outcome: Ongoing  Goal: Maintain normal serum potassium, sodium, calcium, phosphorus, and pH  5/15/2021 2246 by Afsaneh Cummings RN  Outcome: Ongoing  5/15/2021 1024 by Kajal Moore RN  Outcome: Ongoing     Problem: Loneliness or Risk for Loneliness  Goal: Demonstrate positive use of time alone when socialization is not possible  5/15/2021 2246 by Afsaneh Cummings RN  Outcome: Ongoing  5/15/2021 1024 by Kajal Moore RN  Outcome: Ongoing     Problem: Fatigue  Goal: Verbalize increase energy and improved vitality  5/15/2021 2246 by Afsaneh Cummings RN  Outcome: Ongoing  5/15/2021 1024 by Kajal Moore RN  Outcome: Ongoing     Problem: Patient Education: Go to Patient Education Activity  Goal: Patient/Family Education  5/15/2021 2246 by Afsaneh Cummings RN  Outcome: Ongoing  5/15/2021 1024 by Kajal Moore RN  Outcome: Ongoing

## 2021-05-16 NOTE — CONSULTS
day  sodium chloride flush 0.9 % injection 5-40 mL, 5-40 mL, Intravenous, PRN  0.9 % sodium chloride infusion, 25 mL, Intravenous, PRN  polyethylene glycol (GLYCOLAX) packet 17 g, 17 g, Oral, Daily PRN  enoxaparin (LOVENOX) injection 40 mg, 40 mg, Subcutaneous, Daily  acetaminophen (TYLENOL) tablet 650 mg, 650 mg, Oral, Q6H PRN **OR** acetaminophen (TYLENOL) suppository 650 mg, 650 mg, Rectal, Q6H PRN  meclizine (ANTIVERT) tablet 25 mg, 25 mg, Oral, TID PRN  metoprolol tartrate (LOPRESSOR) tablet 25 mg, 25 mg, Oral, BID  albuterol sulfate  (90 Base) MCG/ACT inhaler 2 puff, 2 puff, Inhalation, Q4H WA  ipratropium (ATROVENT HFA) 17 MCG/ACT inhaler 2 puff, 2 puff, Inhalation, Q4H WA  buPROPion (WELLBUTRIN XL) extended release tablet 300 mg, 300 mg, Oral, QAM  hydroxychloroquine (PLAQUENIL) tablet 200 mg, 200 mg, Oral, BID  LORazepam (ATIVAN) tablet 0.5 mg, 0.5 mg, Oral, Q8H PRN  meloxicam (MOBIC) tablet 15 mg, 15 mg, Oral, Daily  vitamin B-12 (CYANOCOBALAMIN) tablet 500 mcg, 500 mcg, Oral, Daily  vitamin D (CHOLECALCIFEROL) tablet 1,000 Units, 1,000 Units, Oral, Daily  benzocaine-menthol (CEPACOL SORE THROAT) lozenge 1 lozenge, 1 lozenge, Oral, Q2H PRN  levalbuterol (XOPENEX) nebulizer solution 1.25 mg, 1.25 mg, Nebulization, Q8H PRN  potassium chloride (KLOR-CON M) extended release tablet 40 mEq, 40 mEq, Oral, Once    Allergies   Allergen Reactions    Shellfish-Derived Products Hives    Penicillins Hives       Social History:    TOBACCO:   reports that she has never smoked. She has never used smokeless tobacco.  ETOH:   reports no history of alcohol use.   Patient currently lives independently  Environmental/chemical exposure: None known    Family History:       Problem Relation Age of Onset    Early Death Mother     High Blood Pressure Mother     Substance Abuse Mother     Cancer Father     Substance Abuse Father      REVIEW OF SYSTEMS:    CONSTITUTIONAL:  negative for fevers, chills, diaphoresis, activity change, appetite change, fatigue, night sweats and unexpected weight change. EYES:  negative for blurred vision, eye discharge, visual disturbance and icterus  HEENT:  negative for hearing loss, tinnitus, ear drainage, sinus pressure, nasal congestion, epistaxis and snoring  RESPIRATORY:  See HPI  CARDIOVASCULAR:  negative for chest pain, palpitations, exertional chest pressure/discomfort, edema, syncope  GASTROINTESTINAL:  negative for nausea, vomiting, diarrhea, constipation, blood in stool and abdominal pain  GENITOURINARY:  negative for frequency, dysuria, urinary incontinence, decreased urine volume, and hematuria  HEMATOLOGIC/LYMPHATIC:  negative for easy bruising, bleeding and lymphadenopathy  ALLERGIC/IMMUNOLOGIC:  negative for recurrent infections, angioedema, anaphylaxis and drug reactions  ENDOCRINE:  negative for weight changes and diabetic symptoms including polyuria, polydipsia and polyphagia  MUSCULOSKELETAL:  negative for  pain, joint swelling, decreased range of motion and muscle weakness  NEUROLOGICAL:  negative for headaches, slurred speech, unilateral weakness  PSYCHIATRIC/BEHAVIORAL: negative for hallucinations, behavioral problems, confusion and agitation. Objective:   PHYSICAL EXAM:      VITALS:  BP (!) 134/111   Pulse 102   Temp 97.3 °F (36.3 °C) (Temporal)   Resp 18   Ht 5' 2\" (1.575 m)   Wt 190 lb 12.8 oz (86.5 kg)   LMP 05/09/2021   SpO2 100%   BMI 34.90 kg/m²      24HR INTAKE/OUTPUT:  No intake or output data in the 24 hours ending 05/16/21 0943  CONSTITUTIONAL:  awake, alert, cooperative, no apparent distress, and appears stated age  NECK:  Supple, symmetrical, trachea midline, no adenopathy, thyroid symmetric, not enlarged and no tenderness, skin normal  LUNGS:  no increased work of breathing and clear to auscultation.  No accessory muscle use  CARDIOVASCULAR: S1 and S2, no edema and no JVD  ABDOMEN:  normal bowel sounds, non-distended and no masses palpated, and no tenderness to palpation. No hepatospleenomegaly  LYMPHADENOPATHY:  no axillary or supraclavicular adenopathy. No cervical adnenopathy  PSYCHIATRIC: Oriented to person place and time. No obvious depression or anxiety. MUSCULOSKELETAL: No obvious misalignment or effusion of the joints. No clubbing, cyanosis of the digits. SKIN:  normal skin color, texture, turgor and no redness, warmth, or swelling. No palpable nodules    DATA:    Old records have been reviewed    CBC:  Recent Labs     05/14/21 2044 05/16/21  0501   WBC 4.1 5.7   RBC 3.54* 3.48*   HGB 11.5* 11.5*   HCT 35.0* 34.9*    240   MCV 98.9 100.3*   MCH 32.6 33.0   MCHC 32.9 32.9   RDW 15.4 15.5*      BMP:  Recent Labs     05/14/21  2044 05/15/21  0817 05/16/21  0501    136 135*   K 2.6* 3.7 3.3*    99 100   CO2 24 24 25   BUN 12 8 13   CREATININE 0.7 0.6 0.7   CALCIUM 8.9 9.5 8.9   GLUCOSE 107* 168* 134*      ABG:  No results for input(s): PHART, RQA3EZH, PO2ART, BVO9TYD, Y5QELQLN, BEART in the last 72 hours. Procalcitonin  Recent Labs     05/14/21 2000   PROCAL 0.04       No results found for: BNP  Lab Results   Component Value Date    TROPONINI <0.01 05/14/2021           Radiology Review:  All pertinent images / reports were reviewed as a part of this visit. Assessment:     1. Shortness of breath  2. Status post Covid infection      Plan:     I have reviewed laboratories, medical records and images for this visit  Exam is clear and she is tolerating room air with good saturations  No leukocytosis and her procalcitonin is normal  CT imaging revealed minimal hazy groundglass opacity. This could be the remnants of Covid pneumonia  I suspect anxiety plays a role in her dyspnea  Okay with me if she is discharged home  I do not think she needs steroids specifically for the groundglass opacity.

## 2021-05-16 NOTE — PROGRESS NOTES
Pt assessment complete. Dyastolic BP elevated all other VSS. Meds given per MAR, Pt did refuse hydroxychloroquine at this time stating she usually takes 2 pills once a day with a meal due to it upsetting her stomach. Spoke with Pt about verifying dose with prescribing physician to make sure timing and dose is appropriate for home and in-patient setting. Call light within reach, will continue to monitor.

## 2021-05-16 NOTE — PROGRESS NOTES
Pt started feeling more short of breath so Respiratory came to do PRN treatment, O2 saturation at 100%. Rechecked on Pt 15min after treatment states she is starting to feel a bit better. Call light within reach, will continue to monitor.

## 2021-05-16 NOTE — DISCHARGE INSTR - COC
Continuity of Care Form    Patient Name: Chung Ortiz   :  1975  MRN:  [id number]    Admit date:  2021  Discharge date:  ***    Code Status Order: Full Code   Advance Directives:   [facility] Documentation     Date/Time Healthcare Directive Type of Healthcare Directive Copy in [address] Agent's Name Healthcare Agent's Phone Number    05/15/21 0520  No, patient does not have an advance directive for healthcare treatment -- -- -- -- --          Admitting Physician:  Juliana Chowdhury MD  PCP: Az Hernandez MD    Discharging Nurse: [facility] Unit/Room#: M6J-5207/9149-89  Discharging Unit Phone Number: ***    Emergency Contact:   Extended Emergency Contact Information  Primary Emergency Contact: Douglas Billingsley  Address: (BOYFRIEND/BABIES FATHER)           [facility], [address] [place] [address] Phone: [phone]  Mobile Phone: [phone]  Relation: Other  Secondary Emergency Contact: NNACI OLVERA  Address: [address] [place] [address], [address] [place] [address] Phone: [phone]  Relation: Spouse    Past Surgical History:  Past Surgical History:   Procedure Laterality Date     SECTION, LOW TRANSVERSE N/A     EYE SURGERY      TUBAL LIGATION      reversal     TUBAL LIGATION      TL reversed in        Immunization History:   Immunization History   Administered Date(s) Administered    Rho (D) Immune Globulin 10/19/2012, 10/15/2014    Tdap (Boostrix, Adacel) 2013       Active Problems:  Patient Active Problem List   Diagnosis Code    Supervision of normal pregnancy Z34.90    Vaginal bleeding in pregnancy O46.90     delivery delivered O82    Acute respiratory distress R06.03       Isolation/Infection:   Isolation          No Isolation        Patient Infection Status     Infection Onset Added Last Indicated Last Indicated By Review Planned Expiration Resolved Resolved By    None active    Resolved    COVID-19 Rule Out 05/14/21 05/14/21 05/15/21 COVID-19 (Ordered)   21 Rule-Out Test Resulted          Nurse Assessment:  Last Vital Signs: BP (!) 134/111   Pulse 102   Temp 97.3 °F (36.3 °C) (Temporal)   Resp 18   Ht 5' 2\" (1.575 m)   Wt 190 lb 12.8 oz (86.5 kg)   LMP 2021   SpO2 100%   BMI 34.90 kg/m²     Last documented pain score (0-10 scale): Pain Level: 0  Last Weight:   Wt Readings from Last 1 Encounters:   21 190 lb 12.8 oz (86.5 kg)     Mental Status:  {IP PT MENTAL STATUS:}    IV Access:  { SYLVESTER IV ACCESS:108533293}    Nursing Mobility/ADLs:  Walking   {CHP DME MVET:578855924}  Transfer  {CHP DME FQNN:220507301}  Bathing  {P DME ZFN}  Dressing  {CHP DME ZQPO:889983781}  Toileting  {CHP DME WUFI:841213603}  Feeding  {P DME ASMZ:401850812}  Med Admin  {P DME XWIX:869234520}  Med Delivery   { SYLVESTER MED Delivery:718103328}    Wound Care Documentation and Therapy:  Incision 13 Abdomen Lower (Active)   Number of days: 4363       Incision 14 (Active)   Number of days: 7149        Elimination:  Continence:   · Bowel: {YES / LW:05044}  · Bladder: {YES / U}  Urinary Catheter: {Urinary Catheter:082256085}   Colostomy/Ileostomy/Ileal Conduit: {YES / ZC:}       Date of Last BM: ***  No intake or output data in the 24 hours ending 21 1003  No intake/output data recorded.     Safety Concerns:     [address] [employer] Safety Concerns:635893819}    Impairments/Disabilities:      [address] [employer] Impairments/Disabilities:699226713}    Nutrition Therapy:  Current Nutrition Therapy:   [address] [employer] Diet List:968496454}    Routes of Feeding: {CHP DME Other Feedings:810919564}  Liquids: {Slp liquid thickness:15681}  Daily Fluid Restriction: {CHP DME Yes amt example:758340779}  Last Modified Barium Swallow with Video (Video Swallowing Test): {Done Not Done MORT:790612522}    Treatments at the Time of Hospital Discharge:   Respiratory

## 2021-05-16 NOTE — PROGRESS NOTES
Gathered belongings and discussed discharge paper work with pt. Removed IV and wheeled pt down to [facility].

## 2021-05-16 NOTE — CARE COORDINATION
Patient admitted as Observation with an anticipated short hospitalization length of stay. Chart reviewed and it appears that patient has minimal needs for discharge at this time. Discussed with patients nurse and requested that case management be notified if discharge needs are identified. *Case management will continue to follow progress and update discharge plan as needed.     Electronically signed by CHELSEY Leon, ARLEEN on 5/16/2021 at 8:56 AM

## 2021-05-23 ENCOUNTER — HOSPITAL ENCOUNTER (INPATIENT)
Age: 46
LOS: 2 days | Discharge: HOME | DRG: 192 | End: 2021-05-26
Attending: EMERGENCY MEDICINE | Admitting: INTERNAL MEDICINE
Payer: COMMERCIAL

## 2021-05-23 ENCOUNTER — APPOINTMENT (OUTPATIENT)
Dept: GENERAL RADIOLOGY | Age: 46
DRG: 192 | End: 2021-05-23
Payer: COMMERCIAL

## 2021-05-23 DIAGNOSIS — J81.0 ACUTE PULMONARY EDEMA (HCC): ICD-10-CM

## 2021-05-23 DIAGNOSIS — R94.31 ABNORMAL EKG: ICD-10-CM

## 2021-05-23 DIAGNOSIS — J18.9 PNEUMONIA DUE TO ORGANISM: Primary | ICD-10-CM

## 2021-05-23 DIAGNOSIS — J45.901 EXACERBATION OF ASTHMA, UNSPECIFIED ASTHMA SEVERITY, UNSPECIFIED WHETHER PERSISTENT: ICD-10-CM

## 2021-05-23 LAB
A/G RATIO: 1.4 (ref 1.1–2.2)
ALBUMIN SERPL-MCNC: 3.9 G/DL (ref 3.4–5)
ALP BLD-CCNC: 101 U/L (ref 40–129)
ALT SERPL-CCNC: 131 U/L (ref 10–40)
ANION GAP SERPL CALCULATED.3IONS-SCNC: 10 MMOL/L (ref 3–16)
AST SERPL-CCNC: 88 U/L (ref 15–37)
BASOPHILS ABSOLUTE: 0.1 K/UL (ref 0–0.2)
BASOPHILS RELATIVE PERCENT: 0.9 %
BILIRUB SERPL-MCNC: 0.7 MG/DL (ref 0–1)
BUN BLDV-MCNC: 15 MG/DL (ref 7–20)
CALCIUM SERPL-MCNC: 9 MG/DL (ref 8.3–10.6)
CHLORIDE BLD-SCNC: 100 MMOL/L (ref 99–110)
CO2: 23 MMOL/L (ref 21–32)
CREAT SERPL-MCNC: 0.7 MG/DL (ref 0.6–1.1)
EOSINOPHILS ABSOLUTE: 0 K/UL (ref 0–0.6)
EOSINOPHILS RELATIVE PERCENT: 0.3 %
GFR AFRICAN AMERICAN: >60
GFR NON-AFRICAN AMERICAN: >60
GLOBULIN: 2.8 G/DL
GLUCOSE BLD-MCNC: 142 MG/DL (ref 70–99)
HCG QUALITATIVE: NEGATIVE
HCT VFR BLD CALC: 36.4 % (ref 36–48)
HEMOGLOBIN: 12 G/DL (ref 12–16)
LYMPHOCYTES ABSOLUTE: 0.7 K/UL (ref 1–5.1)
LYMPHOCYTES RELATIVE PERCENT: 10.9 %
MCH RBC QN AUTO: 32.4 PG (ref 26–34)
MCHC RBC AUTO-ENTMCNC: 33 G/DL (ref 31–36)
MCV RBC AUTO: 98.1 FL (ref 80–100)
MONOCYTES ABSOLUTE: 0.3 K/UL (ref 0–1.3)
MONOCYTES RELATIVE PERCENT: 3.9 %
NEUTROPHILS ABSOLUTE: 5.6 K/UL (ref 1.7–7.7)
NEUTROPHILS RELATIVE PERCENT: 84 %
PDW BLD-RTO: 14.6 % (ref 12.4–15.4)
PLATELET # BLD: 305 K/UL (ref 135–450)
PMV BLD AUTO: 8.8 FL (ref 5–10.5)
POTASSIUM REFLEX MAGNESIUM: 3.7 MMOL/L (ref 3.5–5.1)
PRO-BNP: 4158 PG/ML (ref 0–124)
RBC # BLD: 3.71 M/UL (ref 4–5.2)
SODIUM BLD-SCNC: 133 MMOL/L (ref 136–145)
TOTAL PROTEIN: 6.7 G/DL (ref 6.4–8.2)
TROPONIN: <0.01 NG/ML
WBC # BLD: 6.6 K/UL (ref 4–11)

## 2021-05-23 PROCEDURE — 93005 ELECTROCARDIOGRAM TRACING: CPT | Performed by: EMERGENCY MEDICINE

## 2021-05-23 PROCEDURE — 94761 N-INVAS EAR/PLS OXIMETRY MLT: CPT

## 2021-05-23 PROCEDURE — 36415 COLL VENOUS BLD VENIPUNCTURE: CPT

## 2021-05-23 PROCEDURE — 94640 AIRWAY INHALATION TREATMENT: CPT

## 2021-05-23 PROCEDURE — 85025 COMPLETE CBC W/AUTO DIFF WBC: CPT

## 2021-05-23 PROCEDURE — 71045 X-RAY EXAM CHEST 1 VIEW: CPT

## 2021-05-23 PROCEDURE — 96365 THER/PROPH/DIAG IV INF INIT: CPT

## 2021-05-23 PROCEDURE — 84703 CHORIONIC GONADOTROPIN ASSAY: CPT

## 2021-05-23 PROCEDURE — 96375 TX/PRO/DX INJ NEW DRUG ADDON: CPT

## 2021-05-23 PROCEDURE — 99284 EMERGENCY DEPT VISIT MOD MDM: CPT

## 2021-05-23 PROCEDURE — 96367 TX/PROPH/DG ADDL SEQ IV INF: CPT

## 2021-05-23 PROCEDURE — 83880 ASSAY OF NATRIURETIC PEPTIDE: CPT

## 2021-05-23 PROCEDURE — 84145 PROCALCITONIN (PCT): CPT

## 2021-05-23 PROCEDURE — 6370000000 HC RX 637 (ALT 250 FOR IP): Performed by: NURSE PRACTITIONER

## 2021-05-23 PROCEDURE — 6360000002 HC RX W HCPCS: Performed by: NURSE PRACTITIONER

## 2021-05-23 PROCEDURE — 80053 COMPREHEN METABOLIC PANEL: CPT

## 2021-05-23 PROCEDURE — 84484 ASSAY OF TROPONIN QUANT: CPT

## 2021-05-23 RX ORDER — IPRATROPIUM BROMIDE AND ALBUTEROL SULFATE 2.5; .5 MG/3ML; MG/3ML
1 SOLUTION RESPIRATORY (INHALATION) ONCE
Status: COMPLETED | OUTPATIENT
Start: 2021-05-23 | End: 2021-05-23

## 2021-05-23 RX ORDER — METHYLPREDNISOLONE SODIUM SUCCINATE 125 MG/2ML
125 INJECTION, POWDER, LYOPHILIZED, FOR SOLUTION INTRAMUSCULAR; INTRAVENOUS ONCE
Status: COMPLETED | OUTPATIENT
Start: 2021-05-23 | End: 2021-05-23

## 2021-05-23 RX ADMIN — METHYLPREDNISOLONE SODIUM SUCCINATE 125 MG: 125 INJECTION, POWDER, FOR SOLUTION INTRAMUSCULAR; INTRAVENOUS at 21:34

## 2021-05-23 RX ADMIN — IPRATROPIUM BROMIDE AND ALBUTEROL SULFATE 1 AMPULE: .5; 3 SOLUTION RESPIRATORY (INHALATION) at 21:46

## 2021-05-23 ASSESSMENT — PAIN SCALES - GENERAL: PAINLEVEL_OUTOF10: 6

## 2021-05-23 NOTE — LETTER
[facility] Emergency Department      [address] [facility], [address]            PROOF OF PRESENCE      To Whom It May Concern:    Stan Morillo was admitted at [facility] on 05/24/2021 and a discharge date is undetermined at this time.                                      Sincerely,        CHETAN LAURENT

## 2021-05-23 NOTE — LETTER
[facility] Emergency Department      [address] [facility], [address]            PROOF OF PRESENCE      To Whom It May Concern:      Tru Dorado was admitted at [facility] on 05/24/2021.                                      Sincerely,        CHETAN LAURENT

## 2021-05-24 ENCOUNTER — APPOINTMENT (OUTPATIENT)
Dept: CT IMAGING | Age: 46
DRG: 192 | End: 2021-05-24
Payer: COMMERCIAL

## 2021-05-24 PROBLEM — R94.31 PROLONGED QT INTERVAL: Status: ACTIVE | Noted: 2021-05-24

## 2021-05-24 PROBLEM — J45.909 ASTHMA: Status: ACTIVE | Noted: 2021-05-24

## 2021-05-24 PROBLEM — M32.9 SLE (SYSTEMIC LUPUS ERYTHEMATOSUS) (HCC): Status: ACTIVE | Noted: 2021-05-24

## 2021-05-24 PROBLEM — U07.1 PNEUMONIA DUE TO COVID-19 VIRUS: Status: ACTIVE | Noted: 2021-05-24

## 2021-05-24 PROBLEM — J81.0 ACUTE PULMONARY EDEMA (HCC): Status: ACTIVE | Noted: 2021-05-24

## 2021-05-24 PROBLEM — E66.9 OBESITY (BMI 30-39.9): Status: ACTIVE | Noted: 2021-05-24

## 2021-05-24 PROBLEM — J12.82 PNEUMONIA DUE TO COVID-19 VIRUS: Status: ACTIVE | Noted: 2021-05-24

## 2021-05-24 PROBLEM — J96.01 ACUTE RESPIRATORY FAILURE WITH HYPOXIA (HCC): Status: ACTIVE | Noted: 2021-05-24

## 2021-05-24 LAB
BACTERIA: ABNORMAL /HPF
BILIRUBIN URINE: NEGATIVE
BLOOD, URINE: NEGATIVE
CLARITY: ABNORMAL
COLOR: YELLOW
EKG ATRIAL RATE: 90 BPM
EKG ATRIAL RATE: 92 BPM
EKG DIAGNOSIS: NORMAL
EKG DIAGNOSIS: NORMAL
EKG P AXIS: 53 DEGREES
EKG P AXIS: 54 DEGREES
EKG P-R INTERVAL: 146 MS
EKG P-R INTERVAL: 148 MS
EKG Q-T INTERVAL: 430 MS
EKG Q-T INTERVAL: 438 MS
EKG QRS DURATION: 88 MS
EKG QRS DURATION: 94 MS
EKG QTC CALCULATION (BAZETT): 531 MS
EKG QTC CALCULATION (BAZETT): 535 MS
EKG R AXIS: -5 DEGREES
EKG R AXIS: 2 DEGREES
EKG T AXIS: 166 DEGREES
EKG T AXIS: 97 DEGREES
EKG VENTRICULAR RATE: 90 BPM
EKG VENTRICULAR RATE: 92 BPM
EPITHELIAL CELLS, UA: 9 /HPF (ref 0–5)
GLUCOSE URINE: NEGATIVE MG/DL
HYALINE CASTS: 3 /LPF (ref 0–8)
KETONES, URINE: 40 MG/DL
LEUKOCYTE ESTERASE, URINE: NEGATIVE
LV EF: 25 %
LVEF MODALITY: NORMAL
MICROSCOPIC EXAMINATION: YES
NITRITE, URINE: NEGATIVE
PH UA: 6 (ref 5–8)
PROCALCITONIN: 0.06 NG/ML (ref 0–0.15)
PROTEIN UA: 30 MG/DL
RBC UA: ABNORMAL /HPF (ref 0–4)
SARS-COV-2, NAAT: ABNORMAL
SARS-COV-2: NOT DETECTED
SPECIFIC GRAVITY UA: 1.03 (ref 1–1.03)
URINE REFLEX TO CULTURE: ABNORMAL
URINE TYPE: ABNORMAL
UROBILINOGEN, URINE: 1 E.U./DL
WBC UA: 3 /HPF (ref 0–5)

## 2021-05-24 PROCEDURE — 87449 NOS EACH ORGANISM AG IA: CPT

## 2021-05-24 PROCEDURE — 2700000000 HC OXYGEN THERAPY PER DAY

## 2021-05-24 PROCEDURE — 6360000002 HC RX W HCPCS: Performed by: NURSE PRACTITIONER

## 2021-05-24 PROCEDURE — 94761 N-INVAS EAR/PLS OXIMETRY MLT: CPT

## 2021-05-24 PROCEDURE — U0003 INFECTIOUS AGENT DETECTION BY NUCLEIC ACID (DNA OR RNA); SEVERE ACUTE RESPIRATORY SYNDROME CORONAVIRUS 2 (SARS-COV-2) (CORONAVIRUS DISEASE [COVID-19]), AMPLIFIED PROBE TECHNIQUE, MAKING USE OF HIGH THROUGHPUT TECHNOLOGIES AS DESCRIBED BY CMS-2020-01-R: HCPCS

## 2021-05-24 PROCEDURE — 6360000002 HC RX W HCPCS: Performed by: EMERGENCY MEDICINE

## 2021-05-24 PROCEDURE — 6360000002 HC RX W HCPCS: Performed by: INTERNAL MEDICINE

## 2021-05-24 PROCEDURE — 2580000003 HC RX 258: Performed by: NURSE PRACTITIONER

## 2021-05-24 PROCEDURE — U0005 INFEC AGEN DETEC AMPLI PROBE: HCPCS

## 2021-05-24 PROCEDURE — 93010 ELECTROCARDIOGRAM REPORT: CPT | Performed by: INTERNAL MEDICINE

## 2021-05-24 PROCEDURE — 81001 URINALYSIS AUTO W/SCOPE: CPT

## 2021-05-24 PROCEDURE — 94640 AIRWAY INHALATION TREATMENT: CPT

## 2021-05-24 PROCEDURE — 6370000000 HC RX 637 (ALT 250 FOR IP): Performed by: INTERNAL MEDICINE

## 2021-05-24 PROCEDURE — 2580000003 HC RX 258: Performed by: INTERNAL MEDICINE

## 2021-05-24 PROCEDURE — 93306 TTE W/DOPPLER COMPLETE: CPT

## 2021-05-24 PROCEDURE — 87635 SARS-COV-2 COVID-19 AMP PRB: CPT

## 2021-05-24 PROCEDURE — 1200000000 HC SEMI PRIVATE

## 2021-05-24 PROCEDURE — 87205 SMEAR GRAM STAIN: CPT

## 2021-05-24 PROCEDURE — 99254 IP/OBS CNSLTJ NEW/EST MOD 60: CPT | Performed by: INTERNAL MEDICINE

## 2021-05-24 PROCEDURE — 93005 ELECTROCARDIOGRAM TRACING: CPT | Performed by: INTERNAL MEDICINE

## 2021-05-24 PROCEDURE — 87070 CULTURE OTHR SPECIMN AEROBIC: CPT

## 2021-05-24 PROCEDURE — 99223 1ST HOSP IP/OBS HIGH 75: CPT | Performed by: INTERNAL MEDICINE

## 2021-05-24 PROCEDURE — 71250 CT THORAX DX C-: CPT

## 2021-05-24 PROCEDURE — 6370000000 HC RX 637 (ALT 250 FOR IP): Performed by: NURSE PRACTITIONER

## 2021-05-24 RX ORDER — LANOLIN ALCOHOL/MO/W.PET/CERES
500 CREAM (GRAM) TOPICAL DAILY
Status: DISCONTINUED | OUTPATIENT
Start: 2021-05-24 | End: 2021-05-26 | Stop reason: HOSPADM

## 2021-05-24 RX ORDER — FUROSEMIDE 10 MG/ML
40 INJECTION INTRAMUSCULAR; INTRAVENOUS 2 TIMES DAILY
Status: DISCONTINUED | OUTPATIENT
Start: 2021-05-24 | End: 2021-05-25

## 2021-05-24 RX ORDER — POLYETHYLENE GLYCOL 3350 17 G/17G
17 POWDER, FOR SOLUTION ORAL DAILY PRN
Status: DISCONTINUED | OUTPATIENT
Start: 2021-05-24 | End: 2021-05-26 | Stop reason: HOSPADM

## 2021-05-24 RX ORDER — ALBUTEROL SULFATE 90 UG/1
2 AEROSOL, METERED RESPIRATORY (INHALATION) EVERY 6 HOURS PRN
Status: DISCONTINUED | OUTPATIENT
Start: 2021-05-24 | End: 2021-05-26 | Stop reason: HOSPADM

## 2021-05-24 RX ORDER — PREDNISONE 20 MG/1
40 TABLET ORAL DAILY
Status: CANCELLED | OUTPATIENT
Start: 2021-05-24

## 2021-05-24 RX ORDER — PREDNISONE 10 MG/1
10 TABLET ORAL DAILY
Status: DISCONTINUED | OUTPATIENT
Start: 2021-05-25 | End: 2021-05-26 | Stop reason: HOSPADM

## 2021-05-24 RX ORDER — FUROSEMIDE 10 MG/ML
40 INJECTION INTRAMUSCULAR; INTRAVENOUS ONCE
Status: COMPLETED | OUTPATIENT
Start: 2021-05-24 | End: 2021-05-24

## 2021-05-24 RX ORDER — METHYLPREDNISOLONE SODIUM SUCCINATE 40 MG/ML
40 INJECTION, POWDER, LYOPHILIZED, FOR SOLUTION INTRAMUSCULAR; INTRAVENOUS EVERY 6 HOURS
Status: DISCONTINUED | OUTPATIENT
Start: 2021-05-24 | End: 2021-05-24

## 2021-05-24 RX ORDER — IPRATROPIUM BROMIDE AND ALBUTEROL SULFATE 2.5; .5 MG/3ML; MG/3ML
1 SOLUTION RESPIRATORY (INHALATION) ONCE
Status: COMPLETED | OUTPATIENT
Start: 2021-05-24 | End: 2021-05-24

## 2021-05-24 RX ORDER — PROMETHAZINE HYDROCHLORIDE 25 MG/1
12.5 TABLET ORAL EVERY 6 HOURS PRN
Status: CANCELLED | OUTPATIENT
Start: 2021-05-24

## 2021-05-24 RX ORDER — ALBUTEROL SULFATE 90 UG/1
2 AEROSOL, METERED RESPIRATORY (INHALATION) 4 TIMES DAILY
Status: DISCONTINUED | OUTPATIENT
Start: 2021-05-24 | End: 2021-05-26 | Stop reason: HOSPADM

## 2021-05-24 RX ORDER — ONDANSETRON 2 MG/ML
4 INJECTION INTRAMUSCULAR; INTRAVENOUS EVERY 6 HOURS PRN
Status: CANCELLED | OUTPATIENT
Start: 2021-05-24

## 2021-05-24 RX ORDER — ALBUTEROL SULFATE 2.5 MG/3ML
2.5 SOLUTION RESPIRATORY (INHALATION)
Status: DISCONTINUED | OUTPATIENT
Start: 2021-05-24 | End: 2021-05-26 | Stop reason: HOSPADM

## 2021-05-24 RX ORDER — HYDROXYCHLOROQUINE SULFATE 200 MG/1
200 TABLET, FILM COATED ORAL 2 TIMES DAILY
Status: DISCONTINUED | OUTPATIENT
Start: 2021-05-24 | End: 2021-05-26 | Stop reason: HOSPADM

## 2021-05-24 RX ORDER — CARVEDILOL 3.12 MG/1
3.12 TABLET ORAL 2 TIMES DAILY WITH MEALS
Status: DISCONTINUED | OUTPATIENT
Start: 2021-05-25 | End: 2021-05-26 | Stop reason: HOSPADM

## 2021-05-24 RX ORDER — SODIUM CHLORIDE 0.9 % (FLUSH) 0.9 %
10 SYRINGE (ML) INJECTION PRN
Status: DISCONTINUED | OUTPATIENT
Start: 2021-05-24 | End: 2021-05-26 | Stop reason: HOSPADM

## 2021-05-24 RX ORDER — SODIUM CHLORIDE 9 MG/ML
25 INJECTION, SOLUTION INTRAVENOUS PRN
Status: DISCONTINUED | OUTPATIENT
Start: 2021-05-24 | End: 2021-05-26 | Stop reason: HOSPADM

## 2021-05-24 RX ORDER — MAGNESIUM SULFATE IN WATER 40 MG/ML
2000 INJECTION, SOLUTION INTRAVENOUS PRN
Status: DISCONTINUED | OUTPATIENT
Start: 2021-05-24 | End: 2021-05-26 | Stop reason: HOSPADM

## 2021-05-24 RX ORDER — BUPROPION HYDROCHLORIDE 300 MG/1
300 TABLET ORAL EVERY MORNING
Status: DISCONTINUED | OUTPATIENT
Start: 2021-05-24 | End: 2021-05-26 | Stop reason: HOSPADM

## 2021-05-24 RX ORDER — IPRATROPIUM BROMIDE AND ALBUTEROL SULFATE 2.5; .5 MG/3ML; MG/3ML
1 SOLUTION RESPIRATORY (INHALATION)
Status: DISCONTINUED | OUTPATIENT
Start: 2021-05-24 | End: 2021-05-24

## 2021-05-24 RX ORDER — ACETAMINOPHEN 325 MG/1
650 TABLET ORAL EVERY 6 HOURS PRN
Status: DISCONTINUED | OUTPATIENT
Start: 2021-05-24 | End: 2021-05-25

## 2021-05-24 RX ORDER — POTASSIUM CHLORIDE 7.45 MG/ML
10 INJECTION INTRAVENOUS PRN
Status: DISCONTINUED | OUTPATIENT
Start: 2021-05-24 | End: 2021-05-26 | Stop reason: HOSPADM

## 2021-05-24 RX ORDER — POTASSIUM CHLORIDE 20 MEQ/1
40 TABLET, EXTENDED RELEASE ORAL ONCE
Status: COMPLETED | OUTPATIENT
Start: 2021-05-24 | End: 2021-05-24

## 2021-05-24 RX ORDER — ASCORBIC ACID 500 MG
500 TABLET ORAL 2 TIMES DAILY
Status: DISCONTINUED | OUTPATIENT
Start: 2021-05-24 | End: 2021-05-26 | Stop reason: HOSPADM

## 2021-05-24 RX ORDER — MAGNESIUM SULFATE 1 G/100ML
1000 INJECTION INTRAVENOUS ONCE
Status: COMPLETED | OUTPATIENT
Start: 2021-05-24 | End: 2021-05-24

## 2021-05-24 RX ORDER — ACETAMINOPHEN 650 MG/1
650 SUPPOSITORY RECTAL EVERY 6 HOURS PRN
Status: DISCONTINUED | OUTPATIENT
Start: 2021-05-24 | End: 2021-05-26 | Stop reason: HOSPADM

## 2021-05-24 RX ORDER — POTASSIUM CHLORIDE 20 MEQ/1
40 TABLET, EXTENDED RELEASE ORAL PRN
Status: DISCONTINUED | OUTPATIENT
Start: 2021-05-24 | End: 2021-05-26 | Stop reason: HOSPADM

## 2021-05-24 RX ORDER — MECLIZINE HCL 12.5 MG/1
25 TABLET ORAL 3 TIMES DAILY PRN
Status: DISCONTINUED | OUTPATIENT
Start: 2021-05-24 | End: 2021-05-26 | Stop reason: HOSPADM

## 2021-05-24 RX ORDER — VITAMIN B COMPLEX
1000 TABLET ORAL DAILY
Status: DISCONTINUED | OUTPATIENT
Start: 2021-05-24 | End: 2021-05-26 | Stop reason: HOSPADM

## 2021-05-24 RX ORDER — SODIUM CHLORIDE 0.9 % (FLUSH) 0.9 %
10 SYRINGE (ML) INJECTION EVERY 12 HOURS SCHEDULED
Status: DISCONTINUED | OUTPATIENT
Start: 2021-05-24 | End: 2021-05-26 | Stop reason: HOSPADM

## 2021-05-24 RX ORDER — ZINC SULFATE 50(220)MG
50 CAPSULE ORAL DAILY
Status: CANCELLED | OUTPATIENT
Start: 2021-05-24

## 2021-05-24 RX ORDER — LISINOPRIL 5 MG/1
5 TABLET ORAL DAILY
Status: DISCONTINUED | OUTPATIENT
Start: 2021-05-25 | End: 2021-05-26 | Stop reason: HOSPADM

## 2021-05-24 RX ADMIN — Medication 10 ML: at 09:29

## 2021-05-24 RX ADMIN — AZITHROMYCIN MONOHYDRATE 500 MG: 500 INJECTION, POWDER, LYOPHILIZED, FOR SOLUTION INTRAVENOUS at 01:14

## 2021-05-24 RX ADMIN — Medication 2 PUFF: at 12:20

## 2021-05-24 RX ADMIN — POTASSIUM CHLORIDE 40 MEQ: 1500 TABLET, EXTENDED RELEASE ORAL at 03:28

## 2021-05-24 RX ADMIN — HYDROXYCHLOROQUINE SULFATE 200 MG: 200 TABLET ORAL at 09:22

## 2021-05-24 RX ADMIN — METHYLPREDNISOLONE SODIUM SUCCINATE 40 MG: 40 INJECTION, POWDER, FOR SOLUTION INTRAMUSCULAR; INTRAVENOUS at 12:27

## 2021-05-24 RX ADMIN — BUPROPION HYDROCHLORIDE 300 MG: 300 TABLET, FILM COATED, EXTENDED RELEASE ORAL at 09:22

## 2021-05-24 RX ADMIN — MAGNESIUM SULFATE HEPTAHYDRATE 1000 MG: 1 INJECTION, SOLUTION INTRAVENOUS at 02:29

## 2021-05-24 RX ADMIN — IPRATROPIUM BROMIDE AND ALBUTEROL SULFATE 1 AMPULE: .5; 3 SOLUTION RESPIRATORY (INHALATION) at 01:17

## 2021-05-24 RX ADMIN — ENOXAPARIN SODIUM 40 MG: 40 INJECTION SUBCUTANEOUS at 09:21

## 2021-05-24 RX ADMIN — OXYCODONE HYDROCHLORIDE AND ACETAMINOPHEN 500 MG: 500 TABLET ORAL at 09:22

## 2021-05-24 RX ADMIN — FUROSEMIDE 40 MG: 10 INJECTION, SOLUTION INTRAMUSCULAR; INTRAVENOUS at 02:03

## 2021-05-24 RX ADMIN — Medication 2 PUFF: at 20:27

## 2021-05-24 RX ADMIN — METOPROLOL TARTRATE 25 MG: 25 TABLET, FILM COATED ORAL at 21:13

## 2021-05-24 RX ADMIN — METOPROLOL TARTRATE 25 MG: 25 TABLET, FILM COATED ORAL at 09:22

## 2021-05-24 RX ADMIN — FUROSEMIDE 40 MG: 10 INJECTION, SOLUTION INTRAMUSCULAR; INTRAVENOUS at 09:22

## 2021-05-24 RX ADMIN — Medication 10 ML: at 21:15

## 2021-05-24 RX ADMIN — Medication 2 PUFF: at 09:11

## 2021-05-24 RX ADMIN — Medication 1000 UNITS: at 09:22

## 2021-05-24 RX ADMIN — FUROSEMIDE 40 MG: 10 INJECTION, SOLUTION INTRAMUSCULAR; INTRAVENOUS at 16:58

## 2021-05-24 RX ADMIN — Medication 2 PUFF: at 15:31

## 2021-05-24 RX ADMIN — Medication 2 PUFF: at 15:30

## 2021-05-24 RX ADMIN — Medication 1000 MG: at 01:14

## 2021-05-24 RX ADMIN — HYDROXYCHLOROQUINE SULFATE 200 MG: 200 TABLET ORAL at 21:13

## 2021-05-24 RX ADMIN — CYANOCOBALAMIN TAB 1000 MCG 500 MCG: 1000 TAB at 09:22

## 2021-05-24 RX ADMIN — OXYCODONE HYDROCHLORIDE AND ACETAMINOPHEN 500 MG: 500 TABLET ORAL at 21:14

## 2021-05-24 ASSESSMENT — PAIN SCALES - GENERAL: PAINLEVEL_OUTOF10: 0

## 2021-05-24 ASSESSMENT — PAIN DESCRIPTION - LOCATION: LOCATION: RIB CAGE

## 2021-05-24 ASSESSMENT — PAIN DESCRIPTION - FREQUENCY: FREQUENCY: INTERMITTENT

## 2021-05-24 ASSESSMENT — PAIN DESCRIPTION - DESCRIPTORS: DESCRIPTORS: STABBING

## 2021-05-24 NOTE — PROGRESS NOTES
4 Eyes Skin Assessment     The patient is being assess for  Admission    I agree that 2 RN's have performed a thorough Head to Toe Skin Assessment on the patient. ALL assessment sites listed below have been assessed. Areas assessed by both nurses:   [x]   Head, Face, and Ears   [x]   Shoulders, Back, and Chest  [x]   Arms, Elbows, and Hands   [x]   Coccyx, Sacrum, and IschIum  [x]   Legs, Feet, and Heels        Does the Patient have Skin Breakdown?   No         Landon Prevention initiated:  NA   Wound Care Orders initiated:  NA      WO nurse consulted for Pressure Injury (Stage 3,4, Unstageable, DTI, NWPT, and Complex wounds), New and Established Ostomies:  NA      Nurse 1 eSignature: Electronically signed by Brenda Martins RN on 5/24/21 at 4:42 AM EDT    **SHARE this note so that the co-signing nurse is able to place an eSignature**    Nurse 2 eSignature: Electronically signed by Lon Swanson RN on 5/24/21 at 4:43 AM EDT

## 2021-05-24 NOTE — CONSULTS
[facility]   Consult Note      Reason for Consult: Shortness of breath and hypoxia  Requesting Physician: Georgann Halsted    Subjective:   CHIEF COMPLAINT: Shortness of breath     HPI: Patient was diagnosed with COVID-19 infection in March-did not require hospitalization. However she was hospitalized between  and  for similar symptoms, without hypoxia. 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 However patient states that over the last week or so she continues to have worsening shortness of breath, associated with pedal edema and joint swelling-which she attributes to her history of lupus. Denies any cough or phlegm. Abnormal CT scan noted-with diffuse bilateral groundglass opacities and bibasal effusions, pulmonary consultation has been requested. Patient's main symptom is dyspnea with exertion and mild pedal edema. Has some symptoms related to orthopnea. Denies any cough or hemoptysis. Appears comfortable. History of SLE-positive double-stranded DNA and anti-Smith antibody, on Plaquenil, meloxicam and low-dose prednisone. Follows with rheumatology at [facility]. Patient smokes medical marijuana for history of chronic pain syndrome/anxiety. Last smoked marijuana approximately 2 weeks ago. Never smoked cigarettes. History of asthma as a child, \" grew out of it\".        The patient is a 39 y.o. female with significant past medical history of:      Diagnosis Date    Anemia     Asthma     no attack since teenager no meds    History of 2019 novel coronavirus disease (COVID-19)     Systemic lupus erythematosus ([facility] [address]         Past Surgical History:        Procedure Laterality Date     SECTION, LOW TRANSVERSE N/A     EYE SURGERY      TUBAL LIGATION      reversal     TUBAL LIGATION      TL reversed in      Current Medications:    Current Facility-Administered Medications: albuterol sulfate  (90 Base) MCG/ACT inhaler 2 puff, 2 puff, Inhalation, Q6H PRN buPROPion (WELLBUTRIN XL) extended release tablet 300 mg, 300 mg, Oral, QAM  hydroxychloroquine (PLAQUENIL) tablet 200 mg, 200 mg, Oral, BID  metoprolol tartrate (LOPRESSOR) tablet 25 mg, 25 mg, Oral, BID  vitamin B-12 (CYANOCOBALAMIN) tablet 500 mcg, 500 mcg, Oral, Daily  vitamin D (CHOLECALCIFEROL) tablet 1,000 Units, 1,000 Units, Oral, Daily  ascorbic acid (VITAMIN C) tablet 500 mg, 500 mg, Oral, BID  sodium chloride flush 0.9 % injection 10 mL, 10 mL, Intravenous, 2 times per day  sodium chloride flush 0.9 % injection 10 mL, 10 mL, Intravenous, PRN  0.9 % sodium chloride infusion, 25 mL, Intravenous, PRN  enoxaparin (LOVENOX) injection 40 mg, 40 mg, Subcutaneous, Daily  polyethylene glycol (GLYCOLAX) packet 17 g, 17 g, Oral, Daily PRN  acetaminophen (TYLENOL) tablet 650 mg, 650 mg, Oral, Q6H PRN **OR** acetaminophen (TYLENOL) suppository 650 mg, 650 mg, Rectal, Q6H PRN  albuterol (PROVENTIL) nebulizer solution 2.5 mg, 2.5 mg, Nebulization, Q2H PRN  meclizine (ANTIVERT) tablet 25 mg, 25 mg, Oral, TID PRN  furosemide (LASIX) injection 40 mg, 40 mg, Intravenous, BID  potassium chloride (KLOR-CON M) extended release tablet 40 mEq, 40 mEq, Oral, PRN **OR** potassium bicarb-citric acid (EFFER-K) effervescent tablet 40 mEq, 40 mEq, Oral, PRN **OR** potassium chloride 10 mEq/100 mL IVPB (Peripheral Line), 10 mEq, Intravenous, PRN  magnesium sulfate 2000 mg in 50 mL IVPB premix, 2,000 mg, Intravenous, PRN  sodium phosphate 12.69 mmol in dextrose 5 % 250 mL IVPB, 0.16 mmol/kg, Intravenous, PRN **OR** sodium phosphate 25.41 mmol in dextrose 5 % 250 mL IVPB, 0.32 mmol/kg, Intravenous, PRN  albuterol sulfate  (90 Base) MCG/ACT inhaler 2 puff, 2 puff, Inhalation, 4x daily  ipratropium (ATROVENT HFA) 17 MCG/ACT inhaler 2 puff, 2 puff, Inhalation, 4x daily  methylPREDNISolone sodium (SOLU-MEDROL) injection 40 mg, 40 mg, Intravenous, Q6H    Allergies   Allergen Reactions    Shellfish-Derived Products Hives    Penicillins Hives       Social History:    TOBACCO:   reports that she has never smoked. She has never used smokeless tobacco.  ETOH:   reports no history of alcohol use. Patient currently lives independently    Family History:       Problem Relation Age of Onset    Early Death Mother     High Blood Pressure Mother     Substance Abuse Mother     Cancer Father     Substance Abuse Father        REVIEW OF SYSTEMS:    Constitutional: negative for fatigue, fevers, malaise and weight loss  Ears, nose, mouth, throat: negative for ear drainage, epistaxis, hoarseness, nasal congestion, sore throat and voice change  Respiratory: negative except for shortness of breath  Cardiovascular: negative for chest pain, chest pressure/discomfort, irregular heart beat, lower extremity edema and palpitations  Gastrointestinal: negative for abdominal pain, constipation, diarrhea, jaundice, melena, odynophagia, reflux symptoms and vomiting  Hematologic/lymphatic: negative for bleeding, easy bruising, lymphadenopathy and petechiae  Musculoskeletal:negative for arthralgias, bone pain, muscle weakness, neck pain and stiff joints  Neurological: negative for dizziness, gait problems, headaches, seizures, speech problems, tremors and weakness  Behavioral/Psych: negative for anxiety, behavior problems, depression, fatigue and sleep disturbance  Endocrine: negative for diabetic symptoms including none, neuropathy, polyphagia, polyuria, polydipsia, vomiting and diarrhea and temperature intolerance  Allergic/Immunologic: negative for anaphylaxis, angioedema, hay fever and urticaria      Objective:     Patient Vitals for the past 8 hrs:   BP Temp Temp src Pulse Resp SpO2   05/24/21 0915 (!) 143/98 97.8 °F (36.6 °C) Oral 94 18 96 %   05/24/21 0911     18 100 %   05/24/21 0418 (!) 134/100 99.1 °F (37.3 °C) Oral 97 20 97 %     I/O last 3 completed shifts: In: 250.3 [IV Piggyback:250.3]  Out: -   No intake/output data recorded.     Physical Exam:  General Appearance: alert and oriented to person, place and time, well developed and well- nourished, in no acute distress  Skin: warm and dry, no rash or erythema  Head: normocephalic and atraumatic  Eyes: pupils equal, round, and reactive to light, extraocular eye movements intact, conjunctivae normal  ENT: external ear and ear canal normal bilaterally, nose without deformity, nasal mucosa and turbinates normal  Neck: supple and non-tender without mass, no cervical lymphadenopathy  Pulmonary/Chest: rales present-bilateral, scattered  Cardiovascular: normal rate, regular rhythm,  no murmurs, rubs, distal pulses intact, no carotid bruits  Abdomen: soft, non-tender, non-distended, normal bowel sounds, no masses or organomegaly  Lymph Nodes: Cervical, supraclavicular normal  Extremities: no cyanosis, clubbing or edema  Musculoskeletal: normal range of motion, no joint swelling, deformity or tenderness  Neurologic: alert, no focal neurologic deficits    Data Review:  CBC:   Lab Results   Component Value Date    WBC 6.6 05/23/2021    RBC 3.71 05/23/2021     BMP:   Lab Results   Component Value Date    GLUCOSE 142 05/23/2021    CO2 23 05/23/2021    BUN 15 05/23/2021    CREATININE 0.7 05/23/2021    CALCIUM 9.0 05/23/2021     ABG: No results found for: OQJ6JDV, BEART, N3QCWXIY, PHART, THGBART, TAO0NAK, PO2ART, INK1SBA    Radiology: All pertinent images / reports were reviewed as a part of this visit. Narrative   EXAMINATION:   CT OF THE CHEST WITHOUT CONTRAST 5/24/2021 12:03 am       TECHNIQUE:   CT of the chest was performed without the administration of intravenous   contrast. Multiplanar reformatted images are provided for review.  Dose   modulation, iterative reconstruction, and/or weight based adjustment of the   mA/kV was utilized to reduce the radiation dose to as low as reasonably   achievable.       COMPARISON:   CT chest 05/14/2021 and chest x-ray 05/23/2021.       HISTORY:   [address]

## 2021-05-24 NOTE — ED PROVIDER NOTES
[address] [facility]        Pt Name: Pete Doran  MRN: [id number]  [place] 1975  Date of evaluation: 5/23/2021  Provider: ALEXIS Kraft CNP  PCP: Edwin Rodriguez MD  Note Started: 9:41 PM EDT        I have seen and evaluated this patient with my supervising physician Dr. Usman Nair       Chief Complaint   Patient presents with    Shortness of Breath     Pt reports increasing SOB after having Covid in March, states it feels like she's suffocating if she lies flat. Feels like her hands and feet are numb. HISTORY OF PRESENT ILLNESS   (Location, Timing/Onset, Context/Setting, Quality, Duration, Modifying Factors, Severity, Associated Signs and Symptoms)  Note limiting factors. Pete Doran is a 39 y.o. female with medical history of anemia, asthma, BTL who presents to the ED with complaints of chest tightness with cough and wheezing that is been going on since last night. Patient does note that she was admitted on May 14 through May 16 with similar symptoms. At that point she was diagnosed with hypertensive urgency and asthma exacerbation. She was receiving nebulizer treatments and Solu-Medrol while inpatient. She was discharged home and had been using her albuterol inhaler but was adequate up until last night. Last night she did have some chest tightness, cough, and wheezing and felt like she was gasping for air. Symptoms seem much worse whenever she was lying supine. She was concerned with a flareup of symptoms and this prompted her ED visit. She states her kids are sick with seasonal allergies. She does note some tingling in her bilateral hands and feet with increased deep breathing. She denies any smoking, alcohol use, or street drugs. She denies any associated nausea, vomiting, diarrhea, headache, neck pain, back pain, lightheaded, dizzy, syncope, leg swelling.   Patient does note that she had tested positive for Covid in March. She denies any recent cardiac testing to include a stress test, echo, Holter, or cardiac cath. He denies any previous history of any bleeding or clotting disorders, family history of clotting disorders, or being anticoagulated. Nursing Notes were all reviewed and agreed with or any disagreements were addressed in the HPI. Review of medical records:                                                                    [facility] DISCHARGE SUMMARY     Patient Demographics     Patient. Joanna Randall  Date of Birth. 1975  MRN. [id number]   Primary care provider. Edu Valenzuela MD  (Tel: [phone])  Admit date: 5/14/2021    Discharge date 5/16/2021  Note Date: 5/16/2021      Reason for Hospitalization. Chief Complaint   Patient presents with    Shortness of Breath       pt states hasn't been feeling well and suddenly became sob today. hx of lupus and states \"my lungs are not good\". pt having a hard time talking in between breaths during triage. Significant Findings. Active Problems:    Acute respiratory distress  Resolved Problems:    * No resolved hospital problems. *  Problems and results from this hospitalization that need follow up. Follow up with PCP  Outpatient echo added   Significant test results and incidental findings. CTPA:          Impression:         Prominent central pulmonary vessels which could be due to pulmonary artery   hypertension with no pulmonary emboli identified. Mildly dilated thoracic aorta with no aneurysm or dissection. Hazy reticulonodular and ground-glass opacities scattered throughout both   lungs which probably represents early multisegmental bronchopneumonia.  This   pattern of pneumonia has been described with COVID-19 disease.  Recommend   correlating with lab values and respiratory precautions     Tiny bibasilar pleural effusions.       1.  No acute intracranial abnormality.  Unremarkable noncontrast CT of the head   2. No intracranial aneurysm or large vessel occlusion   3. No significant carotid stenosis or vertebral artery abnormality. 4. Small ground-glass densities within both upper lobes which may represent a   mild infectious/inflammatory process. 5. Mild right hilar adenopathy is partially visualized and is likely reactive   Invasive procedures and treatments. [facility] Course. Pt admitted through the ED with increased shortness of breath. She was never noted to be hypoxic.  sats were 98 to 100 % on RA. She had Covid in March , however she did not have any respiratory symptoms. She was admitted and followed by pulmonary . She was treated for the followin. Dyspnea:  sats are 100 % on RA:  Symptoms may be related to post covid syndrome, lupus or anxiety.  CTPA negative for any PE . Pro calcitonin level is normal. No current indication for any antibiotic therapy. 2. HTN:  I have added low dose BB with good results   3. Lupus:  Resume home meds of plaquenil and mobic,  Was seen by rheumatology on 21 ,  Notes reviewed. 4. Anxiety:  Pt smoke marijuana daily to help with anxiety. Cessation encouraged. 5. COVID was negative  6. Outpatient echo was ordered at d/c . Pt will call to schedule       Nursing Notes were all reviewed and agreed with or any disagreements were addressed in the HPI. REVIEW OF SYSTEMS    (2-9 systems for level 4, 10 or more for level 5)     Review of Systems    Positives and Pertinent negatives as per HPI. Except as noted above in the ROS, all other systems were reviewed and negative.        PAST MEDICAL HISTORY     Past Medical History:   Diagnosis Date    Anemia     Asthma     no attack since teenager no meds         SURGICAL HISTORY     Past Surgical History:   Procedure Laterality Date     SECTION, LOW TRANSVERSE N/A     EYE SURGERY      TUBAL LIGATION      reversal     TUBAL LIGATION TL reversed in 2004         CURRENTMEDICATIONS       Previous Medications    ALBUTEROL SULFATE  (90 BASE) MCG/ACT INHALER    Inhale 2 puffs into the lungs every 6 hours as needed for Shortness of Breath    BUPROPION (WELLBUTRIN XL) 300 MG EXTENDED RELEASE TABLET    Take 300 mg by mouth every morning    DICYCLOMINE (BENTYL) 10 MG CAPSULE    Take 1 capsule by mouth every 6 hours as needed (cramps)    HYDROXYCHLOROQUINE (PLAQUENIL) 200 MG TABLET    Take 200 mg by mouth 2 times daily    LORAZEPAM (ATIVAN) 0.5 MG TABLET    Take 0.5 mg by mouth every 8 hours as needed for Anxiety. MELOXICAM (MOBIC) 15 MG TABLET    Take 15 mg by mouth daily    METOPROLOL TARTRATE (LOPRESSOR) 25 MG TABLET    Take 1 tablet by mouth 2 times daily    ONDANSETRON (ZOFRAN ODT) 4 MG DISINTEGRATING TABLET    Take 1-2 tablets by mouth every 12 hours as needed for Nausea May Sub regular tablet (non-ODT) if insurance does not cover ODT. PREDNISONE (DELTASONE) 5 MG TABLET    Take 5 mg by mouth daily    VITAMIN B-12 (CYANOCOBALAMIN) 500 MCG TABLET    Take 500 mcg by mouth daily    VITAMIN D (CHOLECALCIFEROL) 25 MCG (1000 UT) TABS TABLET    Take 1,000 Units by mouth daily         ALLERGIES     Shellfish-derived products and Penicillins    FAMILYHISTORY       Family History   Problem Relation Age of Onset    Early Death Mother     High Blood Pressure Mother     Substance Abuse Mother     Cancer Father     Substance Abuse Father           SOCIAL HISTORY       Social History     Tobacco Use    Smoking status: Never Smoker    Smokeless tobacco: Never Used   Substance Use Topics    Alcohol use: No    Drug use: No       SCREENINGS             PHYSICAL EXAM    (up to 7 for level 4, 8 or more for level 5)     ED Triage Vitals [05/23/21 2047]   BP Temp Temp Source Pulse Resp SpO2 Height Weight   (!) 146/106 98.3 °F (36.8 °C) Oral 96 24 97 % 5' 2\" (1.575 m) 175 lb (79.4 kg)       Physical Exam  Vitals and nursing note reviewed. 142 (*)      (*)     AST 88 (*)     All other components within normal limits    Narrative:     Performed at:  [facility] [address]  [place]   Phone [phone]   BRAIN NATRIURETIC PEPTIDE - Abnormal; Notable for the following components:    Pro-BNP 4,158 (*)     All other components within normal limits    Narrative:     Performed at:  [facility] [address]  [employer]   Phone [phone]   URINE RT REFLEX TO CULTURE - Abnormal; Notable for the following components:    Clarity, UA CLOUDY (*)     Ketones, Urine 40 (*)     Protein, UA 30 (*)     All other components within normal limits    Narrative:     Performed at:  [facility] [address]  [employer]   Phone [phone]   MICROSCOPIC URINALYSIS - Abnormal; Notable for the following components:    Bacteria, UA 1+ (*)     Epithelial Cells, UA 9 (*)     All other components within normal limits    Narrative:     Performed at:  [facility] [address]  [place]   Phone [phone]   TROPONIN    Narrative:     Performed at:  [facility] [address]  [place]   Phone [phone]   HCG, SERUM, QUALITATIVE    Narrative:     Performed at:  [facility] [address]  [place]   Phone [phone]       All other labs were within normal range or not returned as of this dictation. EKG: All EKG's are interpreted by the Emergency Department Physician in the absence of a cardiologist.  Please see their note for interpretation of EKG.     RADIOLOGY:   Non-plain film images such as CT, Ultrasound and MRI are read by the radiologist. Plain radiographic images are visualized and preliminarily interpreted by the ED Provider 21)   ipratropium-albuterol (DUONEB) nebulizer solution 1 ampule (1 ampule Inhalation Given 21)   ipratropium-albuterol (DUONEB) nebulizer solution 1 ampule (1 ampule Inhalation Given 21 0117)   cefTRIAXone (ROCEPHIN) 1000 mg in sterile water 10 mL IV syringe (1,000 mg Intravenous Given 21 0114)   furosemide (LASIX) injection 40 mg (40 mg Intravenous Given 21 0203)           Pertinent Labs & Imaging studies reviewed. (See chart for details)   -  Patient seen and evaluated in the emergency department. -  Triage and nursing notes reviewed and incorporated. -  Old chart records reviewed and incorporated. -  Patient case discussed with attending physician, Dr. Sue Philip. They saw and examined patient. -  Differential diagnosis includes:  Pleural effusion, CHF, COPD, asthma exacerbation, bronchitis, pneumonia, pneumothorax, pericarditis, flash pulmonary edema, vs COVID-19.   -  Work-up included:  See above COVID-19, BNP, UA, hCG, CBC, CMP, troponin, CXR, CT chest, EKG  -  ED treatment included:   Solu-Medrol, DuoNeb, Rocephin, Lasix, Zithromax  - Consults: Hospitalist, Dr. Verma Davidson  -  Results discussed with patient. Labs show  UA with cloudy clarity, 40 ketones, 30 protein, 1+ bacteria 9 epithelial cells. CBC with RBC 3.71. Sodium 133, glucose 142, , AST 88. Troponin negative. BNP 4158. hCG negative. Rapid Covid is indeterminant. Covid PCR was ordered at the time of admission. CT chest without contrast shows interlobular septal thickening, pleural effusions, and groundglass airspace disease could represent pulmonary edema given indication. Correlate clinically to exclude superimposed pneumonia. Other findings as described. CXR shows bilateral pulmonary opacities concerning for multifocal viral pneumonia. Patient was informed on these results.   States that she was feeling better after the initial nebulizer treatment, however feels like she is wheezing and tachypneic again. An additional nebulizer treatment was given and she is able to rest much easier. Informed on the need for admission for further testing and treatment. Informed how she was to be set up with outpatient echo, however will have this when admitted into the hospital due to her symptoms. She was given a dose of Lasix for the elevated BNP. The patient is agreeable with plan of care and disposition.  -  Disposition:   Admission    CRITICAL CARE TIME   Total Critical Care time was 45 minutes, excluding separately reportable procedures. There was a high probability of clinically significant/life threatening deterioration in the patient's condition which required my urgent intervention. FINAL IMPRESSION      1. Pneumonia due to organism    2. Exacerbation of asthma, unspecified asthma severity, unspecified whether persistent    3. Abnormal EKG    4.  Acute pulmonary edema ([address]          DISPOSITION/PLAN   DISPOSITION    Admission       (Please note that portions of this note were completed with a voice recognition program.  Efforts were made to edit the dictations but occasionally words are mis-transcribed.)    ALEXIS Beltre CNP (electronically signed)           ALEXIS Beltre CNP  05/24/21 6932

## 2021-05-24 NOTE — PROGRESS NOTES
Patient arrived to the unit in stable condition. Patient oriented to room and use of call light. On 2LNC. Call light within reach, will continue to monitor.

## 2021-05-24 NOTE — H&P
Hospital Medicine History and Physical    5/24/2021    Date of Admission: 5/24/2021    Date of Service: Pt seen/examined on 5/24/2021 and admitted to inpatient. Assessment/plan:  1. Acute pulmonary edema. Suspect CHF. Cannot exclude pulmonary hypertension. Patient was recently diagnosed with COVID-19 in March 2021, doubt new COVID-19 infection. COVID-19 test is currently pending in the emergency room. Given suspicion for CHF, will continue IV Lasix and maintain on fluid restriction. Will check echocardiogram.  Procalcitonin level is normal.  Continue breathing treatments. Monitor pulse oximeter. 2. Prolonged QTc noted on EKG. Avoid QTprolonging medications. Patient takes Plaquenil as outpatient (for lupus); will continue for now with plan to possibly stop if QTC does not resolve on repeat EKG at 8 AM today. Give 1 g of magnesium sulfate. Monitor on telemetry. 3. Low normal potassium levels. Given IV Lasix initiation, will give 40 mEq of p.o. potassium x1. Monitor electrolytes closely. 4. Other comorbidities: history of systemic lupus erythematosus, asthma, obesity with BMI of 32 kg/m², recent COVID-19 pneumonia in March 2021. Activities: Up with assist  Prophylaxis: Subcutaneous Lovenox  Code status: Full code    ==========================================================  Chief complaint:  Chief Complaint   Patient presents with    Shortness of Breath     Pt reports increasing SOB after having Covid in March, states it feels like she's suffocating if she lies flat. Feels like her hands and feet are numb. History of Presenting Illness:   This is a pleasant 39 y.o. female with history of systemic lupus erythematosus, asthma, obesity with BMI of 32 kg/m², recent COVID-19 pneumonia in March 2021, who was recently discharged from [facility] on 5/16/2021 (following admission with shortness of breath), who presents to the emergency room with complaints of worsening disintegrating tablet Take 1-2 tablets by mouth every 12 hours as needed for Nausea May Sub regular tablet (non-ODT) if insurance does not cover ODT. 11/3/18  Yes ALEXIS Gonzalez CNP   predniSONE (DELTASONE) 5 MG tablet Take 5 mg by mouth daily    Historical Provider, MD   dicyclomine (BENTYL) 10 MG capsule Take 1 capsule by mouth every 6 hours as needed (cramps) 11/3/18   ALEXIS Gonzalez CNP       Allergy(ies):  Shellfish-derived products and Penicillins    Social History:  TOBACCO:  reports that she has never smoked. She has never used smokeless tobacco.  ETOH:  reports no history of alcohol use. Family History:      Problem Relation Age of Onset    Early Death Mother     High Blood Pressure Mother     Substance Abuse Mother     Cancer Father     Substance Abuse Father        Review of Systems:  Pertinent positives are listed in HPI. At least 10-point ROS reviewed and were negative. Vitals and physical examination:  BP (!) 140/110   Pulse 99   Temp 98.3 °F (36.8 °C) (Oral)   Resp 14   Ht 5' 2\" (1.575 m)   Wt 175 lb (79.4 kg)   LMP 05/09/2021   SpO2 100%   BMI 32.01 kg/m²   Gen/overall appearance: Not in acute distress. Alert. Oriented x3. Head: Normocephalic, atraumatic  Eyes: EOMI, good acuity  ENT: Oral mucosa moist  Neck: No JVD, thyromegaly  CVS: Nml S1S2, no MRG, RRR  Pulm: Slightly tachypneic. Clear bilaterally. No crackles/wheezes  Gastrointestinal: Soft, NT/ND, +BS  Musculoskeletal: No edema. Warm  Neuro: No focal deficit. Moves extremity spontaneously. Psychiatry: Appropriate affect. Not agitated. Skin: Warm, dry with normal turgor.  No rash  Capillary refill: Brisk,< 3 seconds   Peripheral Pulses: +2 palpable, equal bilaterally       Labs/imaging/EKG:  CBC:   Recent Labs     05/23/21 2110   WBC 6.6   HGB 12.0        BMP:    Recent Labs     05/23/21 2110   *   K 3.7      CO2 23   BUN 15   CREATININE 0.7   GLUCOSE 142*     Hepatic: Recent Labs     05/23/21 2110   AST 88*   *   BILITOT 0.7   ALKPHOS 101       T CHEST WO CONTRAST  Result Date: 5/24/2021  1. Interlobular septal thickening, pleural effusions, and ground-glass airspace disease could represent pulmonary edema given indication. Correlate clinically to exclude superimposed pneumonia. 2. Other findings as described. XR CHEST PORTABLE  Result Date: 5/23/2021  Bilateral pulmonary opacities concerning for multifocal viral pneumonia. CT CHEST PULMONARY EMBOLISM W CONTRAST  Result Date: 5/14/2021  Prominent central pulmonary vessels which could be due to pulmonary artery hypertension with no pulmonary emboli identified. Mildly dilated thoracic aorta with no aneurysm or dissection. Hazy reticulonodular and ground-glass opacities scattered throughout both lungs which probably represents early multisegmental bronchopneumonia. This pattern of pneumonia has been described with COVID-19 disease. Recommend correlating with lab values and respiratory precautions Tiny bibasilar pleural effusions. EKG: Sinus rhythm, rate 92 beats per minutes. There are inverted T waves throughout. No acute ST changes. LVH is present. QTc 531. I reviewed EKG. Discussed with ER provider.       Thank you René Nevarez MD for the opportunity to be involved in this patient's care.    -----------------------------  Talia Shearer MD  [facility] hospitalist

## 2021-05-24 NOTE — PROGRESS NOTES
Pupils equal, round, and reactive to light. Conjunctivae/corneas clear. Neck: Supple, with full range of motion. No jugular venous distention. Trachea midline. Respiratory:  Poor AE BL. Scattered BL rales and wheezing. No rhonchi. Cardiovascular: Regular rate and rhythm with normal S1/S2 without murmurs, rubs or gallops. Abdomen: Soft, non-tender, non-distended with normal bowel sounds. Musculoskeletal: No clubbing, cyanosis or edema bilaterally. Full range of motion without deformity. Skin: Skin color, texture, turgor normal.  No rashes or lesions. Neurologic:  Neurovascularly intact without any focal sensory/motor deficits. Cranial nerves: II-XII intact, grossly non-focal.  Psychiatric: Alert and oriented, thought content appropriate, normal insight  Capillary Refill: Brisk,3 seconds, normal   Peripheral Pulses: +2 palpable, equal bilaterally       Labs:   Recent Labs     05/23/21 2110   WBC 6.6   HGB 12.0   HCT 36.4        Recent Labs     05/23/21 2110   *   K 3.7      CO2 23   BUN 15   CREATININE 0.7   CALCIUM 9.0     Recent Labs     05/23/21 2110   AST 88*   *   BILITOT 0.7   ALKPHOS 101     No results for input(s): INR in the last 72 hours. Recent Labs     05/23/21 2110   TROPONINI <0.01       Urinalysis:      Lab Results   Component Value Date    NITRU Negative 05/24/2021    WBCUA 3 05/24/2021    BACTERIA 1+ 05/24/2021    RBCUA 0-2 05/24/2021    BLOODU Negative 05/24/2021    SPECGRAV 1.028 05/24/2021    GLUCOSEU Negative 05/24/2021       Radiology:  CT CHEST WO CONTRAST   Final Result   1. Interlobular septal thickening, pleural effusions, and ground-glass   airspace disease could represent pulmonary edema given indication. Correlate   clinically to exclude superimposed pneumonia. 2. Other findings as described. XR CHEST PORTABLE   Final Result   Bilateral pulmonary opacities concerning for multifocal viral pneumonia.                  Assessment/Plan: Active Hospital Problems    Diagnosis     Acute pulmonary edema (HCC) [J81.0]     Prolonged QT interval [R94.31]     SLE (systemic lupus erythematosus) (HCC) [M32.9]     Asthma [J45.909]     Obesity (BMI 30-39. 9) [E66.9]     Acute respiratory failure with hypoxia ([facility] [address] [J96.01]     Pneumonia due to COVID-19 virus [U07.1, J12.82]      1. Cont Lasix 40 mg IV bid  2. Solumedrol 40 mg IV q6h  3. Check Echo for SOB  4. Cardio consult for CHF  5. Pulm consult for SOB  6. Wean O2 as tolerated  7. Albuterol and Atrovent HFA     DVT Prophylaxis: Lovenox  Diet: DIET LOW SODIUM 2 GM; 2000 ml  Code Status: Full Code    PT/OT Eval Status: Not needed    Dispo - Home    Discussed with patient, nursing and CM. Will see what all think.     Derick Contreras MD

## 2021-05-24 NOTE — ACP (ADVANCE CARE PLANNING)
Advanced Care Planning Note. Purpose of Encounter: Advanced care planning in light of SLE  Parties In Attendance: Patient  Decisional Capacity: Yes  Subjective: Patient is coughing  Objective: Cr 0.7 on 21  Goals of Care Determination: Patient wants full support (CPR, vent, surgery, HD, trach, PEG)  Plan:  IV Steroids, IV Lasix, Echo, Pulm and cardio consults  Code Status: Full code   Time spent on Advanced care Plannin minutes  Advanced Care Planning Documents: Completed advanced directives on chart,  is the POA.     Ravinder Prasad MD  2021 7:42 AM

## 2021-05-24 NOTE — ED NOTES
Report given to [employer]. No further questions at this time.      Manjinder Ozuna RN  05/24/21 5530

## 2021-05-24 NOTE — ED PROVIDER NOTES
I independently performed a history and physical on [facility]. All diagnostic, treatment, and disposition decisions were made by myself in conjunction with the advanced practice provider. I have participated in the medical decision making and directed the treatment plan and disposition of the patient. For further details of [facility] emergency department encounter, please see the advanced practice provider's documentation. CHIEF COMPLAINT  Chief Complaint   Patient presents with    Shortness of Breath     Pt reports increasing SOB after having Covid in March, states it feels like she's suffocating if she lies flat. Feels like her hands and feet are numb. Briefly, [facility] is a 39 y.o. female  who presents to the ED complaining of return of SOB/malaise since Saturday. Had Brielle in March and this feels similar. Worse when supine. Reports tingling in extremities. NO history of CHF. No fevers. Cough is noted and produces yellow mucus. No hemoptysis. Some bilat feet swelling. Has lupus history. FOCUSED PHYSICAL EXAMINATION  BP (!) 140/110   Pulse 99   Temp 98.3 °F (36.8 °C) (Oral)   Resp 14   Ht 5' 2\" (1.575 m)   Wt 175 lb (79.4 kg)   LMP 05/09/2021   SpO2 100%   BMI 32.01 kg/m²    Focused physical examination notable for no acute distress, well-appearing, well-nourished, normal speech and mentation without obvious facial droop, no obvious rash. No obvious cranial nerve deficits on my initial exam. 1+ BLE edema, RRR, bibasilar rales, mild cough noted, conversational dyspnea present.     The 12 lead EKG was interpreted by me as follows:  Rate: normal with a rate of 92  Rhythm: sinus  Axis: normal  Intervals: long QTc, narrow QRS  LVH changes  ST segments: no ST elevations or depressions  T waves: flattened diffusely  Non-specific T wave changes: present  Prior EKG comparison: EKG dated 5/14/21 is significantly different due to nonspecific changes and improved HR    MDM:  Diagnostic considerations included acute coronary syndrome, pulmonary embolism, COPD/asthma, pneumonia, sepsis, pericardial tamponade, pneumothorax, CHF, thoracic aortic dissection, anxiety    ED course was notable for concern with imaging for pneumonia versus pulmonary edema. BNP is elevated. By the time I evaluated her she had no wheezing but had received multiple nebulizers as well as IV steroids. She will be covered for pneumonia with antibiotics although does not appear septic. Patient's EKG is nonspecific but somewhat abnormal compared to previous. She was intending to get an outpatient echocardiogram based on previous work-ups however given her worsening symptomatology she will be given IV Lasix and admitted and may end up getting this as an inpatient. Her rapid Covid is indeterminate but she did have this in March. Nonetheless a PCR is pending as she may be reinfected. During the patient's ED course, the patient was given:  Medications   azithromycin (ZITHROMAX) 500 mg in D5W 250ml Vial Mate (500 mg Intravenous New Bag 5/24/21 0114)   methylPREDNISolone sodium (SOLU-MEDROL) injection 125 mg (125 mg Intravenous Given 5/23/21 2134)   ipratropium-albuterol (DUONEB) nebulizer solution 1 ampule (1 ampule Inhalation Given 5/23/21 2146)   ipratropium-albuterol (DUONEB) nebulizer solution 1 ampule (1 ampule Inhalation Given 5/24/21 0117)   cefTRIAXone (ROCEPHIN) 1000 mg in sterile water 10 mL IV syringe (1,000 mg Intravenous Given 5/24/21 0114)   furosemide (LASIX) injection 40 mg (40 mg Intravenous Given 5/24/21 0203)        CLINICAL IMPRESSION  1. Pneumonia due to organism    2. Exacerbation of asthma, unspecified asthma severity, unspecified whether persistent    3. Abnormal EKG    4. Acute pulmonary edema ([facility] [address]        DISPOSITION  Genny Power was admitted in fair condition. The plan is to admit to the hospital at this time under the hospitalist service.   Dr. Arianne Beebe accepted the patient and will take over the patient's care. This chart was created using Dragon dictation software. Efforts were made by me to ensure accuracy, however some errors may be present due to limitations of this technology.             Sarath Alvarado MD  05/24/21 0540

## 2021-05-24 NOTE — PROGRESS NOTES
Shift assessment complete. Vital signs obtained. AM medications administered per MAR. Pt denies pain and shortness of breath, expresses relief post inhaler. Pt expresses no needs at this time. Call light in reach. Pt independent in room.

## 2021-05-24 NOTE — CONSULTS
[address]  Advanced CHF/Pulmonary Hypertension   Cardiac Evaluation      Roscoe Corona  YOB: 1975    Requesting PHysician:  Dr. Linda Box      Chief Complaint   Patient presents with    Shortness of Breath     Pt reports increasing SOB after having Covid in March, states it feels like she's suffocating if she lies flat. Feels like her hands and feet are numb. History of Present Illness:  Roscoe Corona is a 38 yo female with a history of anemia, asthma, SLE who presented to the ED with chest tightness with cough and wheezing that has been going on for 48 hours. She was admitted 5/14-5/16 with similar symptoms. During that admission, she was diagnosed with hypertensive urgency and asthma exacerbation. She was receiving nebulizer treatments and Solu-Medrol while inpatient. She was discharged home on albuterol inhaler that worked up until the night before admission. On the night before admission, she had chest tightness, cough, and wheezing and felt like she was gasping for air. She admits to [facility]. She came to the ED. Her kids are sick with seasonal allergies. She denies smoking, alcohol use, or street drugs. No nausea, vomiting, diarrhea, headache, neck pain, back pain, lightheadedness, dizziness, syncope, leg swelling. She tested positive for COVID in March. No cardiac testing in March. She had a CT of the chest in the ED that revealed interlobular septal thickening, pleural effusions, groundglass airspace disease concerning for possible pulmonary edema. She received IV Lasix, zithromax, rocephin, solumedrol and breathing treatments in the ED. She has been given IV Lasix and is diuresing. She is feeling better. She sees rheumatology at [facility]. Her SLE was diagnosed in the last 2 years. It started with a malar rash.   She has not seen a pulmonologist or cardiologist.  She has never had an echo.         Labs:  Sodium 133  K 3.7  BUN/Cre 15/0.7  BNP 4158 Glucose 142  H/H12.0/36.4    CT chest:  Impression   1. Interlobular septal thickening, pleural effusions, and ground-glass   airspace disease could represent pulmonary edema given indication.  Correlate   clinically to exclude superimposed pneumonia. 2. Other findings as described. Echo:  5/24/21:   -The left ventricle is dilated. -Ejection fraction is visually estimated to be 25%. -There is mild hypertrophy of the posterior wall.   -There is diffuse global hypokinesis. -The inferolateral wall appears hypokinetic.   -Grade I diastolic dysfunction with normal LV filling pressures. E/e' =   15.4.   -The left atrium is dilated. -Trivial aortic regurgitation.   -Moderate to severe mitral regurgitation.   -There is at least moderate tricuspid regurgitation. RVSP = 36 mmHG. -Mild pulmonic regurgitation present.     Meds prior to admission:  Metoprolol 25 bid  Hydroxychloroquine 200 bid  Prednisone 5 qd      Allergies   Allergen Reactions    Shellfish-Derived Products Hives    Penicillins Hives     Current Facility-Administered Medications   Medication Dose Route Frequency Provider Last Rate Last Admin    albuterol sulfate  (90 Base) MCG/ACT inhaler 2 puff  2 puff Inhalation Q6H PRN Cristy Mosley MD        buPROPion (WELLBUTRIN XL) extended release tablet 300 mg  300 mg Oral QAM Cristy Mosley MD   300 mg at 05/24/21 4878    hydroxychloroquine (PLAQUENIL) tablet 200 mg  200 mg Oral BID Cristy Mosley MD   200 mg at 05/24/21 5107    metoprolol tartrate (LOPRESSOR) tablet 25 mg  25 mg Oral BID Cristy Mosley MD   25 mg at 05/24/21 9935    vitamin B-12 (CYANOCOBALAMIN) tablet 500 mcg  500 mcg Oral Daily Cristy Mosley MD   500 mcg at 05/24/21 0581    vitamin D (CHOLECALCIFEROL) tablet 1,000 Units  1,000 Units Oral Daily Cristy Mosley MD   1,000 Units at 05/24/21 5046    ascorbic acid (VITAMIN C) tablet 500 mg  500 mg Oral BID Cristy Mosley MD   500 mg at 05/24/21 1450  sodium chloride flush 0.9 % injection 10 mL  10 mL Intravenous 2 times per day Salena Ba MD   10 mL at 05/24/21 0929    sodium chloride flush 0.9 % injection 10 mL  10 mL Intravenous PRN Salena Ba MD        0.9 % sodium chloride infusion  25 mL Intravenous PRN Salena Ba MD        enoxaparin (LOVENOX) injection 40 mg  40 mg Subcutaneous Daily Salena Ba MD   40 mg at 05/24/21 7347    polyethylene glycol (GLYCOLAX) packet 17 g  17 g Oral Daily PRN Salena Ba MD        acetaminophen (TYLENOL) tablet 650 mg  650 mg Oral Q6H PRN Salena Ba MD        Or    acetaminophen (TYLENOL) suppository 650 mg  650 mg Rectal Q6H PRN Salena Ba MD        albuterol (PROVENTIL) nebulizer solution 2.5 mg  2.5 mg Nebulization Q2H PRN Salena Ba MD        meclizine (ANTIVERT) tablet 25 mg  25 mg Oral TID PRN Salena Ba MD        furosemide (LASIX) injection 40 mg  40 mg Intravenous BID Salena Ba MD   40 mg at 05/24/21 0840    potassium chloride (KLOR-CON M) extended release tablet 40 mEq  40 mEq Oral PRN Salena Ba MD        Or    potassium bicarb-citric acid (EFFER-K) effervescent tablet 40 mEq  40 mEq Oral PRN Salena Ba MD        Or    potassium chloride 10 mEq/100 mL IVPB (Peripheral Line)  10 mEq Intravenous PRN Salena Ba MD        magnesium sulfate 2000 mg in 50 mL IVPB premix  2,000 mg Intravenous PRN Salena Ba MD        sodium phosphate 12.69 mmol in dextrose 5 % 250 mL IVPB  0.16 mmol/kg Intravenous PRN Salena Ba MD        Or    sodium phosphate 25.41 mmol in dextrose 5 % 250 mL IVPB  0.32 mmol/kg Intravenous PRN Salena Ba MD        albuterol sulfate  (90 Base) MCG/ACT inhaler 2 puff  2 puff Inhalation 4x daily Salena Ba MD   2 puff at 05/24/21 0911    ipratropium (ATROVENT HFA) 17 MCG/ACT inhaler 2 puff  2 puff Inhalation 4x daily Salena Ba MD   2 puff at 05/24/21 1106  methylPREDNISolone sodium (SOLU-MEDROL) injection 40 mg  40 mg Intravenous Etta Frye MD           Past Medical History:   Diagnosis Date    Anemia     Asthma     no attack since teenager no meds    History of 2019 novel coronavirus disease (COVID-19)     Systemic lupus erythematosus ([facility] [address]      Past Surgical History:   Procedure Laterality Date     SECTION, LOW TRANSVERSE N/A 2013    EYE SURGERY      TUBAL LIGATION      reversal     TUBAL LIGATION      TL reversed in      Family History   Problem Relation Age of Onset    Early Death Mother     High Blood Pressure Mother     Substance Abuse Mother     Cancer Father     Substance Abuse Father      Social History     Socioeconomic History    Marital status:      Spouse name: Not on file    Number of children: Not on file    Years of education: Not on file    Highest education level: Not on file   Occupational History    Not on file   Tobacco Use    Smoking status: Never Smoker    Smokeless tobacco: Never Used   Substance and Sexual Activity    Alcohol use: No    Drug use: No    Sexual activity: Yes     Partners: Male   Other Topics Concern    Not on file   Social History Narrative    Not on file     Social Determinants of Health     Financial Resource Strain:     Difficulty of Paying Living Expenses:    Food Insecurity:     Worried About Running Out of Food in the Last Year:     Ran Out of Food in the Last Year:    Transportation Needs:     Lack of Transportation (Medical):      Lack of Transportation (Non-Medical):    Physical Activity:     Days of Exercise per Week:     Minutes of Exercise per Session:    Stress:     Feeling of Stress :    Social Connections:     Frequency of Communication with Friends and Family:     Frequency of Social Gatherings with Friends and Family:     Attends Hoahaoism Services:     Active Member of Clubs or Organizations:     Attends Club or Organization Meetings:     Marital Status:    Intimate Partner Violence:     Fear of Current or Ex-Partner:     Emotionally Abused:     Physically Abused:     Sexually Abused:        Review of Systems:   · Constitutional: there has been no unanticipated weight loss. There's been no change in energy level, sleep pattern, or activity level. · Eyes: No visual changes or diplopia. No scleral icterus. · ENT: No Headaches, hearing loss or vertigo. No mouth sores or sore throat. · Cardiovascular: Reviewed in HPI  · Respiratory: No cough or wheezing, no sputum production. No hematemesis. · Gastrointestinal: No abdominal pain, appetite loss, blood in stools. No change in bowel or bladder habits. · Genitourinary: No dysuria, trouble voiding, or hematuria. · Musculoskeletal:  No gait disturbance, weakness or joint complaints. · Integumentary: No rash or pruritis. · Neurological: No headache, diplopia, change in muscle strength, numbness or tingling. No change in gait, balance, coordination, mood, affect, memory, mentation, behavior. · Psychiatric: No anxiety, no depression. · Endocrine: No malaise, fatigue or temperature intolerance. No excessive thirst, fluid intake, or urination. No tremor. · Hematologic/Lymphatic: No abnormal bruising or bleeding, blood clots or swollen lymph nodes. · Allergic/Immunologic: No nasal congestion or hives. Physical Examination:    Vitals:    05/24/21 0330 05/24/21 0418 05/24/21 0911 05/24/21 0915   BP: (!) 136/101 (!) 134/100  (!) 143/98   Pulse: 96 97  94   Resp:  20 18 18   Temp:  99.1 °F (37.3 °C)  97.8 °F (36.6 °C)   TempSrc:  Oral  Oral   SpO2: 100% 97% 100% 96%   Weight:       Height:         Body mass index is 32.01 kg/m².      Wt Readings from Last 3 Encounters:   05/23/21 175 lb (79.4 kg)   05/16/21 190 lb 12.8 oz (86.5 kg)   11/03/18 185 lb (83.9 kg)     BP Readings from Last 3 Encounters:   05/24/21 (!) 143/98   05/16/21 (!) 134/111   11/03/18 (!) 104/53     Constitutional and General Appearance:   WD/WN in NAD  HEENT:  NC/AT  KIANNA  No problems with hearing  Skin:  Warm, dry  Respiratory:  · Normal excursion and expansion without use of accessory muscles  · Resp Auscultation: Normal breath sounds without dullness  Cardiovascular:  · The apical impulses not displaced  · Heart tones are crisp and normal  · Cervical veins are not engorged  · The carotid upstroke is normal in amplitude and contour without delay or bruit  JVP 9-10 cm H2O  RRR with nl S1 and S2 without m,r,g  · Peripheral pulses are symmetrical and full  · There is no clubbing, cyanosis of the extremities. · Trace bilateral edema  · Femoral Arteries: 2+ and equal  · Pedal Pulses: 2+ and equal   Neck:  · No thyromegaly  Abdomen:  · No masses or tenderness  · Liver/Spleen: No Abnormalities Noted  Neurological/Psychiatric:  · Alert and oriented in all spheres  · Moves all extremities well  · Exhibits normal gait balance and coordination  · No abnormalities of mood, affect, memory, mentation, or behavior are noted    Labs were reviewed including labs from other hospital systems through [facility]. Cardiac testing was reviewed including echos, nuclear scans, cardiac catheterization, including from other hospital systems through [facility]. Assessment:    1. Pneumonia due to organism    2. Exacerbation of asthma, unspecified asthma severity, unspecified whether persistent    3. Abnormal EKG    4. Acute pulmonary edema (HCC)     5. Acute systolic HF, possibly due to COVID  6. SLE, with possible interstitial lung disease    Plan:  1. Continue IV Lasix  2. Start lisinopril 5 mg po qd  3. Change metoprolol to coreg 3.125 mg po bid  4. Get a [address] tomorrow to rule out CAD  5. If Myoview negative, would get a cardiac MRI  6. No evidence of pulmonary hypertension on echo, but needs to be followed for the development of PAH due to SLE  7. CHF education reinforced.   ~salt restriction  ~fluid restriction ~medication compliance  ~daily weights and notify of any significant weight gain/loss  ~establish with CHF nurse  ~outpatient follow-up with our CHF team        I appreciate the opportunity of cooperating in the care of this patient.     Christi Simon M.D., [facility]

## 2021-05-25 PROBLEM — R07.2 PRECORDIAL PAIN: Status: ACTIVE | Noted: 2021-05-25

## 2021-05-25 PROBLEM — I42.0 DILATED CARDIOMYOPATHY (HCC): Status: ACTIVE | Noted: 2021-05-25

## 2021-05-25 LAB
A/G RATIO: 1.4 (ref 1.1–2.2)
ALBUMIN SERPL-MCNC: 3.7 G/DL (ref 3.4–5)
ALP BLD-CCNC: 83 U/L (ref 40–129)
ALT SERPL-CCNC: 108 U/L (ref 10–40)
ANION GAP SERPL CALCULATED.3IONS-SCNC: 9 MMOL/L (ref 3–16)
AST SERPL-CCNC: 52 U/L (ref 15–37)
BASOPHILS ABSOLUTE: 0 K/UL (ref 0–0.2)
BASOPHILS RELATIVE PERCENT: 0.4 %
BILIRUB SERPL-MCNC: 0.5 MG/DL (ref 0–1)
BUN BLDV-MCNC: 15 MG/DL (ref 7–20)
CALCIUM SERPL-MCNC: 9.2 MG/DL (ref 8.3–10.6)
CHLORIDE BLD-SCNC: 101 MMOL/L (ref 99–110)
CO2: 26 MMOL/L (ref 21–32)
CREAT SERPL-MCNC: 0.8 MG/DL (ref 0.6–1.1)
EOSINOPHILS ABSOLUTE: 0 K/UL (ref 0–0.6)
EOSINOPHILS RELATIVE PERCENT: 0.1 %
GFR AFRICAN AMERICAN: >60
GFR NON-AFRICAN AMERICAN: >60
GLOBULIN: 2.6 G/DL
GLUCOSE BLD-MCNC: 109 MG/DL (ref 70–99)
HCT VFR BLD CALC: 36 % (ref 36–48)
HEMOGLOBIN: 11.9 G/DL (ref 12–16)
L. PNEUMOPHILA SEROGP 1 UR AG: NORMAL
LEFT VENTRICULAR EJECTION FRACTION MODE: NORMAL
LV EF: 25 %
LYMPHOCYTES ABSOLUTE: 1.3 K/UL (ref 1–5.1)
LYMPHOCYTES RELATIVE PERCENT: 16.5 %
MAGNESIUM: 2 MG/DL (ref 1.8–2.4)
MCH RBC QN AUTO: 32.3 PG (ref 26–34)
MCHC RBC AUTO-ENTMCNC: 33.2 G/DL (ref 31–36)
MCV RBC AUTO: 97.5 FL (ref 80–100)
MONOCYTES ABSOLUTE: 0.6 K/UL (ref 0–1.3)
MONOCYTES RELATIVE PERCENT: 7.8 %
NEUTROPHILS ABSOLUTE: 5.9 K/UL (ref 1.7–7.7)
NEUTROPHILS RELATIVE PERCENT: 75.2 %
PDW BLD-RTO: 14.9 % (ref 12.4–15.4)
PHOSPHORUS: 3.3 MG/DL (ref 2.5–4.9)
PLATELET # BLD: 285 K/UL (ref 135–450)
PMV BLD AUTO: 9 FL (ref 5–10.5)
POTASSIUM SERPL-SCNC: 3.7 MMOL/L (ref 3.5–5.1)
PRO-BNP: 1528 PG/ML (ref 0–124)
RBC # BLD: 3.69 M/UL (ref 4–5.2)
SODIUM BLD-SCNC: 136 MMOL/L (ref 136–145)
STREP PNEUMONIAE ANTIGEN, URINE: NORMAL
TOTAL PROTEIN: 6.3 G/DL (ref 6.4–8.2)
WBC # BLD: 7.8 K/UL (ref 4–11)

## 2021-05-25 PROCEDURE — 6370000000 HC RX 637 (ALT 250 FOR IP): Performed by: INTERNAL MEDICINE

## 2021-05-25 PROCEDURE — 4A023N7 MEASUREMENT OF CARDIAC SAMPLING AND PRESSURE, LEFT HEART, PERCUTANEOUS APPROACH: ICD-10-PCS | Performed by: INTERNAL MEDICINE

## 2021-05-25 PROCEDURE — 99233 SBSQ HOSP IP/OBS HIGH 50: CPT | Performed by: NURSE PRACTITIONER

## 2021-05-25 PROCEDURE — 2500000003 HC RX 250 WO HCPCS

## 2021-05-25 PROCEDURE — 94761 N-INVAS EAR/PLS OXIMETRY MLT: CPT

## 2021-05-25 PROCEDURE — 83735 ASSAY OF MAGNESIUM: CPT

## 2021-05-25 PROCEDURE — 99233 SBSQ HOSP IP/OBS HIGH 50: CPT | Performed by: INTERNAL MEDICINE

## 2021-05-25 PROCEDURE — C1751 CATH, INF, PER/CENT/MIDLINE: HCPCS

## 2021-05-25 PROCEDURE — 93460 R&L HRT ART/VENTRICLE ANGIO: CPT | Performed by: INTERNAL MEDICINE

## 2021-05-25 PROCEDURE — 94640 AIRWAY INHALATION TREATMENT: CPT

## 2021-05-25 PROCEDURE — 83880 ASSAY OF NATRIURETIC PEPTIDE: CPT

## 2021-05-25 PROCEDURE — C1894 INTRO/SHEATH, NON-LASER: HCPCS

## 2021-05-25 PROCEDURE — C1769 GUIDE WIRE: HCPCS

## 2021-05-25 PROCEDURE — 2580000003 HC RX 258: Performed by: INTERNAL MEDICINE

## 2021-05-25 PROCEDURE — 36415 COLL VENOUS BLD VENIPUNCTURE: CPT

## 2021-05-25 PROCEDURE — 1200000000 HC SEMI PRIVATE

## 2021-05-25 PROCEDURE — 6360000002 HC RX W HCPCS: Performed by: INTERNAL MEDICINE

## 2021-05-25 PROCEDURE — 2709999900 HC NON-CHARGEABLE SUPPLY

## 2021-05-25 PROCEDURE — 99223 1ST HOSP IP/OBS HIGH 75: CPT | Performed by: INTERNAL MEDICINE

## 2021-05-25 PROCEDURE — 85025 COMPLETE CBC W/AUTO DIFF WBC: CPT

## 2021-05-25 PROCEDURE — 84100 ASSAY OF PHOSPHORUS: CPT

## 2021-05-25 PROCEDURE — 99153 MOD SED SAME PHYS/QHP EA: CPT

## 2021-05-25 PROCEDURE — 6360000002 HC RX W HCPCS

## 2021-05-25 PROCEDURE — 80053 COMPREHEN METABOLIC PANEL: CPT

## 2021-05-25 PROCEDURE — 6360000004 HC RX CONTRAST MEDICATION: Performed by: INTERNAL MEDICINE

## 2021-05-25 PROCEDURE — 99152 MOD SED SAME PHYS/QHP 5/>YRS: CPT

## 2021-05-25 PROCEDURE — C1760 CLOSURE DEV, VASC: HCPCS

## 2021-05-25 PROCEDURE — 2580000003 HC RX 258

## 2021-05-25 PROCEDURE — 93460 R&L HRT ART/VENTRICLE ANGIO: CPT

## 2021-05-25 RX ORDER — FUROSEMIDE 40 MG/1
40 TABLET ORAL DAILY
Status: DISCONTINUED | OUTPATIENT
Start: 2021-05-26 | End: 2021-05-26 | Stop reason: HOSPADM

## 2021-05-25 RX ORDER — SODIUM CHLORIDE 0.9 % (FLUSH) 0.9 %
5-40 SYRINGE (ML) INJECTION EVERY 12 HOURS SCHEDULED
Status: DISCONTINUED | OUTPATIENT
Start: 2021-05-25 | End: 2021-05-26 | Stop reason: HOSPADM

## 2021-05-25 RX ORDER — METHYLPREDNISOLONE SODIUM SUCCINATE 125 MG/2ML
125 INJECTION, POWDER, LYOPHILIZED, FOR SOLUTION INTRAMUSCULAR; INTRAVENOUS ONCE
Status: COMPLETED | OUTPATIENT
Start: 2021-05-25 | End: 2021-05-25

## 2021-05-25 RX ORDER — SODIUM CHLORIDE 0.9 % (FLUSH) 0.9 %
5-40 SYRINGE (ML) INJECTION PRN
Status: DISCONTINUED | OUTPATIENT
Start: 2021-05-25 | End: 2021-05-26 | Stop reason: HOSPADM

## 2021-05-25 RX ORDER — LABETALOL HYDROCHLORIDE 5 MG/ML
10 INJECTION, SOLUTION INTRAVENOUS EVERY 4 HOURS PRN
Status: DISCONTINUED | OUTPATIENT
Start: 2021-05-25 | End: 2021-05-26 | Stop reason: HOSPADM

## 2021-05-25 RX ORDER — SODIUM CHLORIDE 9 MG/ML
25 INJECTION, SOLUTION INTRAVENOUS PRN
Status: DISCONTINUED | OUTPATIENT
Start: 2021-05-25 | End: 2021-05-26 | Stop reason: HOSPADM

## 2021-05-25 RX ORDER — ACETAMINOPHEN 325 MG/1
650 TABLET ORAL EVERY 4 HOURS PRN
Status: DISCONTINUED | OUTPATIENT
Start: 2021-05-25 | End: 2021-05-26 | Stop reason: HOSPADM

## 2021-05-25 RX ORDER — ONDANSETRON 2 MG/ML
4 INJECTION INTRAMUSCULAR; INTRAVENOUS EVERY 6 HOURS PRN
Status: DISCONTINUED | OUTPATIENT
Start: 2021-05-25 | End: 2021-05-26 | Stop reason: HOSPADM

## 2021-05-25 RX ADMIN — Medication 2 PUFF: at 12:41

## 2021-05-25 RX ADMIN — IOPAMIDOL 66 ML: 755 INJECTION, SOLUTION INTRAVENOUS at 17:19

## 2021-05-25 RX ADMIN — Medication 2 PUFF: at 20:16

## 2021-05-25 RX ADMIN — FUROSEMIDE 40 MG: 10 INJECTION, SOLUTION INTRAMUSCULAR; INTRAVENOUS at 10:12

## 2021-05-25 RX ADMIN — Medication 2 PUFF: at 09:32

## 2021-05-25 RX ADMIN — Medication 10 ML: at 20:32

## 2021-05-25 RX ADMIN — CARVEDILOL 3.12 MG: 3.12 TABLET, FILM COATED ORAL at 20:31

## 2021-05-25 RX ADMIN — ENOXAPARIN SODIUM 40 MG: 40 INJECTION SUBCUTANEOUS at 10:12

## 2021-05-25 RX ADMIN — METHYLPREDNISOLONE SODIUM SUCCINATE 125 MG: 125 INJECTION, POWDER, FOR SOLUTION INTRAMUSCULAR; INTRAVENOUS at 14:48

## 2021-05-25 RX ADMIN — PREDNISONE 10 MG: 10 TABLET ORAL at 10:12

## 2021-05-25 RX ADMIN — Medication 10 ML: at 10:13

## 2021-05-25 RX ADMIN — OXYCODONE HYDROCHLORIDE AND ACETAMINOPHEN 500 MG: 500 TABLET ORAL at 20:31

## 2021-05-25 RX ADMIN — ACETAMINOPHEN 650 MG: 325 TABLET ORAL at 20:31

## 2021-05-25 RX ADMIN — HYDROXYCHLOROQUINE SULFATE 200 MG: 200 TABLET ORAL at 20:31

## 2021-05-25 ASSESSMENT — PAIN SCALES - GENERAL
PAINLEVEL_OUTOF10: 0
PAINLEVEL_OUTOF10: 4
PAINLEVEL_OUTOF10: 0
PAINLEVEL_OUTOF10: 3
PAINLEVEL_OUTOF10: 0

## 2021-05-25 ASSESSMENT — PAIN DESCRIPTION - DESCRIPTORS
DESCRIPTORS: TIGHTNESS
DESCRIPTORS: DISCOMFORT
DESCRIPTORS: DISCOMFORT;DULL;SHARP;SHOOTING

## 2021-05-25 ASSESSMENT — PAIN DESCRIPTION - FREQUENCY
FREQUENCY: CONTINUOUS
FREQUENCY: INTERMITTENT

## 2021-05-25 ASSESSMENT — PAIN DESCRIPTION - PROGRESSION
CLINICAL_PROGRESSION: NOT CHANGED

## 2021-05-25 ASSESSMENT — PAIN - FUNCTIONAL ASSESSMENT
PAIN_FUNCTIONAL_ASSESSMENT: ACTIVITIES ARE NOT PREVENTED

## 2021-05-25 ASSESSMENT — PAIN DESCRIPTION - ORIENTATION
ORIENTATION: MID
ORIENTATION: RIGHT

## 2021-05-25 ASSESSMENT — PAIN DESCRIPTION - PAIN TYPE
TYPE: ACUTE PAIN

## 2021-05-25 ASSESSMENT — PAIN DESCRIPTION - ONSET
ONSET: ON-GOING
ONSET: ON-GOING

## 2021-05-25 ASSESSMENT — PAIN DESCRIPTION - LOCATION
LOCATION: CHEST
LOCATION: CHEST
LOCATION: GROIN

## 2021-05-25 NOTE — PROGRESS NOTES
Spoke with floor RN, communicated message as follows per Dr. Ayo Urias. Stress test cancelled. Dr. Ayo Urias spoke with patient about cardiac cath procedure. Dr. Ayo Urias stated he will place order for steroid to be administered prior to patient being taken to cath lab. Patient transported back to room by stress techSheryl.   Elizabeth Waggoner RN

## 2021-05-25 NOTE — PRE SEDATION
2004         Medications:  Current Facility-Administered Medications   Medication Dose Route Frequency Provider Last Rate Last Admin    albuterol sulfate  (90 Base) MCG/ACT inhaler 2 puff  2 puff Inhalation Q6H PRN Marcellus Sue MD        buPROPion (WELLBUTRIN XL) extended release tablet 300 mg  300 mg Oral QAM Marcellus Sue MD   300 mg at 05/24/21 1457    hydroxychloroquine (PLAQUENIL) tablet 200 mg  200 mg Oral BID Marcellus Sue MD   200 mg at 05/24/21 2113    vitamin B-12 (CYANOCOBALAMIN) tablet 500 mcg  500 mcg Oral Daily Marcellus Sue MD   500 mcg at 05/24/21 1823    vitamin D (CHOLECALCIFEROL) tablet 1,000 Units  1,000 Units Oral Daily Marcellus Sue MD   1,000 Units at 05/24/21 1679    ascorbic acid (VITAMIN C) tablet 500 mg  500 mg Oral BID Marcellus Sue MD   500 mg at 05/24/21 2114    sodium chloride flush 0.9 % injection 10 mL  10 mL Intravenous 2 times per day Marcellus Sue MD   10 mL at 05/25/21 1013    sodium chloride flush 0.9 % injection 10 mL  10 mL Intravenous PRN Marcellus Sue MD        0.9 % sodium chloride infusion  25 mL Intravenous PRN Marcellus Sue MD        enoxaparin (LOVENOX) injection 40 mg  40 mg Subcutaneous Daily Marcellus Sue MD   40 mg at 05/25/21 1012    polyethylene glycol (GLYCOLAX) packet 17 g  17 g Oral Daily PRN Mracellus Sue MD        acetaminophen (TYLENOL) tablet 650 mg  650 mg Oral Q6H PRN Marcellus Sue MD        Or    acetaminophen (TYLENOL) suppository 650 mg  650 mg Rectal Q6H PRN Marcellus Sue MD        albuterol (PROVENTIL) nebulizer solution 2.5 mg  2.5 mg Nebulization Q2H PRN Marcellus Sue MD        meclizine (ANTIVERT) tablet 25 mg  25 mg Oral TID PRN Marcellus Sue MD        furosemide (LASIX) injection 40 mg  40 mg Intravenous BID Marcellus Sue MD   40 mg at 05/25/21 1012    potassium chloride (KLOR-CON M) extended release tablet 40 mEq  40 mEq Oral PRN Marcellus Sue MD Or    potassium bicarb-citric acid (EFFER-K) effervescent tablet 40 mEq  40 mEq Oral PRN Kristen Plummer MD        Or    potassium chloride 10 mEq/100 mL IVPB (Peripheral Line)  10 mEq Intravenous PRN Kristen Plummer MD        magnesium sulfate 2000 mg in 50 mL IVPB premix  2,000 mg Intravenous PRN Kristen Plummer MD        sodium phosphate 12.69 mmol in dextrose 5 % 250 mL IVPB  0.16 mmol/kg Intravenous PRN Kristen Plummer MD        Or    sodium phosphate 25.41 mmol in dextrose 5 % 250 mL IVPB  0.32 mmol/kg Intravenous PRN Kristen Plummer MD        albuterol sulfate  (90 Base) MCG/ACT inhaler 2 puff  2 puff Inhalation 4x daily Kristen Plummer MD   2 puff at 05/25/21 1241    ipratropium (ATROVENT HFA) 17 MCG/ACT inhaler 2 puff  2 puff Inhalation 4x daily Kristen Plummer MD   2 puff at 05/25/21 1241    predniSONE (DELTASONE) tablet 10 mg  10 mg Oral Daily Sajan Guerrero MD   10 mg at 05/25/21 1012    carvedilol (COREG) tablet 3.125 mg  3.125 mg Oral BID WC Ishan Alexis MD        lisinopril (PRINIVIL;ZESTRIL) tablet 5 mg  5 mg Oral Daily Ishan Alexis MD        regadenoson (LEXISCAN) injection 0.4 mg  0.4 mg Intravenous ONCE PRN Ishan Alexis MD               Pre-Sedation:    Pre-Sedation Documentation and Exam:  I have personally completed a history, physical exam & review of systems for this patient (see notes). I have assessed the patient and agree with the H&P present on the chart. Prior History of Anesthesia Complications:   none    Modified Mallampati:  I (soft palate, uvula, fauces, tonsillar pillars visible)    ASA Classification:  Class 3 - A patient with severe systemic disease that limits activity but is not incapacitating      Jose Scale:   Activity:  2 - Able to move 4 extremities voluntarily on command  Respiration:  2 - Able to breathe deeply and cough freely  Circulation:  2 - BP+/- 20mmHg of normal  Consciousness:  2 - Fully awake  Oxygen Saturation (color):  2 - Able to maintain oxygen saturation >92% on room air    Sedation/Anesthesia Plan:  Guard the patient's safety and welfare. Minimize physical discomfort and pain. Minimize negative psychological responses to treatment by providing sedation and analgesia and maximize the potential amnesia. Patient to meet pre-procedure discharge plan.     Medication Planned:  midazolam intravenously and fentanyl intravenously    Patient is an appropriate candidate for plan of sedation: yes      Electronically signed by Ivonne Myers MD on 5/25/2021 at 4:41 PM

## 2021-05-25 NOTE — CONSULTS
[facility]  Interventional Cardiology Consultation  [phone]        Reason for Consultation/Chief Complaint: \" Cardiomyopathy. \"    History of Present Illness:  Elesa Cushing is a 39 y.o. patient who presented to the hospital with complaints of 2-week history of progressive shortness of breath and 1 week history of intermittent chest discomfort. She describes some leg swelling as well as difficulty laying flat. She also describes chest discomfort as a mid sternal chest tightness which seem to occur with exertion. She does have a past medical history which includes systemic lupus erythematosus. She has family history of cardiac disease as well. Past Medical History:   has a past medical history of Anemia, Asthma, History of 2019 novel coronavirus disease (COVID-19), and Systemic lupus erythematosus ([facility] [address] Surgical History:   has a past surgical history that includes Tubal ligation; Tubal ligation; eye surgery; and  section, low transverse (N/A, ). Social History:   reports that she has never smoked. She has never used smokeless tobacco. She reports that she does not drink alcohol and does not use drugs. Family History:  family history includes Cancer in her father; Early Death in her mother; High Blood Pressure in her mother; Substance Abuse in her father and mother. Home Medications:  Were reviewed and are listed in nursing record. and/or listed below  Prior to Admission medications    Medication Sig Start Date End Date Taking?  Authorizing Provider   metoprolol tartrate (LOPRESSOR) 25 MG tablet Take 1 tablet by mouth 2 times daily 21  Yes Chhaya Rand APRN - CNP   albuterol sulfate  (90 Base) MCG/ACT inhaler Inhale 2 puffs into the lungs every 6 hours as needed for Shortness of Breath 21  Yes ALEXIS Henley - SARAH   hydroxychloroquine (PLAQUENIL) 200 MG tablet Take 200 mg by mouth 2 times daily   Yes Historical Provider, MD buPROPion (WELLBUTRIN XL) 300 MG extended release tablet Take 300 mg by mouth every morning   Yes Historical Provider, MD   vitamin B-12 (CYANOCOBALAMIN) 500 MCG tablet Take 500 mcg by mouth daily   Yes Historical Provider, MD   vitamin D (CHOLECALCIFEROL) 25 MCG (1000 UT) TABS tablet Take 1,000 Units by mouth daily   Yes Historical Provider, MD   meloxicam (MOBIC) 15 MG tablet Take 15 mg by mouth daily   Yes Historical Provider, MD   predniSONE (DELTASONE) 5 MG tablet Take 5 mg by mouth daily   Yes Historical Provider, MD   dicyclomine (BENTYL) 10 MG capsule Take 1 capsule by mouth every 6 hours as needed (cramps) 11/3/18   ALEXIS Fox CNP   ondansetron (ZOFRAN ODT) 4 MG disintegrating tablet Take 1-2 tablets by mouth every 12 hours as needed for Nausea May Sub regular tablet (non-ODT) if insurance does not cover ODT. 11/3/18   ALEXIS Fox CNP        Allergies:  Shellfish-derived products and Penicillins     Review of Systems:   A complete review of systems has been reviewed and updated today and is negative except as noted in the history of present illness.       Physical Examination:    Vitals:    05/25/21 1230   BP: (!) 127/95   Pulse: 93   Resp: 17   Temp: 97.8 °F (36.6 °C)   SpO2: 100%    Weight: 171 lb 6.4 oz (77.7 kg)         General Appearance:  Alert, cooperative, no distress, appears stated age   Head:  Normocephalic, without obvious abnormality, atraumatic   Eyes:  EOMI, conjunctiva/corneas clear       Nose: Nares normal   Throat: Lips normal   Neck: Supple, symmetrical, trachea midline,  no carotid bruit or JVD       Lungs:   Clear to auscultation bilaterally, respirations unlabored   Chest Wall:  No tenderness or deformity   Heart:  Regular rate and rhythm, S1, S2 normal, no significant murmur, no rub or gallop   Abdomen:   Soft, non-tender, bowel sounds active all four quadrants,  no masses, no organomegaly           Extremities: Extremities normal, atraumatic, no cyanosis or edema   Pulses: 2+ and symmetric   Skin: Skin color, texture, turgor normal, no rashes or lesions   Pysch: Normal mood and affect   Neurologic: Normal gross motor and sensory exam.         Labs  CBC:   Lab Results   Component Value Date    WBC 7.8 05/25/2021    RBC 3.69 05/25/2021    HGB 11.9 05/25/2021    HCT 36.0 05/25/2021    MCV 97.5 05/25/2021    RDW 14.9 05/25/2021     05/25/2021     CMP:    Lab Results   Component Value Date     05/25/2021    K 3.7 05/25/2021    K 3.7 05/23/2021     05/25/2021    CO2 26 05/25/2021    BUN 15 05/25/2021    CREATININE 0.8 05/25/2021    GFRAA >60 05/25/2021    AGRATIO 1.4 05/25/2021    LABGLOM >60 05/25/2021    GLUCOSE 109 05/25/2021    PROT 6.3 05/25/2021    CALCIUM 9.2 05/25/2021    BILITOT 0.5 05/25/2021    ALKPHOS 83 05/25/2021    AST 52 05/25/2021     05/25/2021     LIPIDS: No components found for: TOTAL CHOLESTEROL,  HDL,  LDL,  TRIGLYCERIDES  PT/INR:  No results found for: PTINR  Lab Results   Component Value Date    TROPONINI <0.01 05/23/2021       EKG:  I have reviewed EKG with the following interpretation:  Impression:    24-MAY-2021 08:15:24 [address] ROUTINE RECORD  Normal sinus rhythm  Voltage criteria for left ventricular hypertrophy  Nonspecific T wave abnormality  Prolonged QT  Abnormal ECG  When compared with ECG of 23-MAY-2021 20:42,  No significant change was found    Imaging/Procedures:   Echo 5/24/2021:   Summary   -The left ventricle is dilated. -Ejection fraction is visually estimated to be 25%. -There is mild hypertrophy of the posterior wall.   -There is diffuse global hypokinesis. -The inferolateral wall appears hypokinetic.   -Grade I diastolic dysfunction with normal LV filling pressures. E/e' =   15.4.   -The left atrium is dilated. -Trivial aortic regurgitation.   -Moderate to severe mitral regurgitation.   -There is at least moderate tricuspid regurgitation. RVSP = 36 mmHG.    -Mild pulmonic regurgitation present. Assessment/Plan:  Principal Problem:    Acute pulmonary edema (HCC)  Plan: Much improved. Etiology unclear. Needs further cardiac evaluation. Active Problems:    Acute systolic (congestive) heart failure (HCC)  Plan: Clinically much improved. Etiology unclear. Does have severe LV systolic dysfunction on 2D echo with Doppler. Dilated cardiomyopathy ([address]  Plan: New. Etiology unclear. Ischemic contribution needs to be ruled out. Recommend cardiac catheterization. Precordial pain  Plan: Etiology unclear. Recommend cardiac catheterization. Prolonged QT interval  Plan: Continue to monitor. SLE (systemic lupus erythematosus) (HCC)  Plan: Chronic. Per hospitalist service. Asthma  Plan: Chronic. Per hospitalist service. Pneumonia due to COVID-19 virus  Plan: Unsure how much COVID-19 contributes. Discussed current status with patient as well as options of further evaluation and treatment. While stress testing may be of some benefit, cardiac catheterization would be much more beneficial in terms of the information provided to better guide her care. Performance of the procedure as well as the associated risk, benefits and alternatives have been discussed. Good and pertinent questions have been asked and answered. She does have an iodine allergy which has caused tingling of her mouth but no tongue swelling or more severe reaction known of. She has received a dose of Solu-Medrol yesterday as well as prednisone. Will give additional Solu-Medrol. I think the risk of anaphylactic reaction is low. Thank you for allowing us to participate in the care of [facility]. Further evaluation will be based upon the patient's clinical course and testing results. All questions and concerns were addressed to the patient/family. Alternatives to my treatment were discussed. The note was completed using EMR.  Every effort was made to ensure accuracy; however, inadvertent computerized transcription errors may be present.     Yuliet Wellington M.D.

## 2021-05-25 NOTE — BRIEF OP NOTE
Patient:  Oziel Connelly   :   1975      Cardiac Cath 2021:  Access: Right CFA, Right CFV  Hemostasis: Angio-Seal closure device, manual pressure  Conscious sedation start time: 163  Conscious sedation stop time:   Versed: 4 mg  Fentanyl: 150 mcg  Bleeding risk: Low  LVEDP: 6 mmHg  AO: 103/85 mmHg  Estimated blood loss: Less than 20 mL  Contrast: 66 mL  Fluoroscopy time: 1.5 min. Anatomy:   LM-normal  LAD-normal  Cx-normal  OM1- normal  RCA-normal  Left dominant  LVEF-25%, 3+ MR      Hemodynamics:  RA- 4/3 mean 0  RV-28/-1, 4  PAWP-9/4 mean 5  PA- 27/10 mean 17    C. O. 6.1      Impression:  1. Normal coronary arteries. 2.  Severe LV systolic dysfunction. 3.  Moderate to severe mitral regurgitation. 4.  Normal right-sided pressures. Plan:  1. Medical management. 2.  Consult EP regarding potential evaluation for LifeVest.      Discussed with patient and also called her fiancé and updated.

## 2021-05-25 NOTE — PROGRESS NOTES
[address]  HEART FAILURE  Progress Note      Admit Date 5/23/2021     Reason for Consult:      Reason for Consultation/Chief Complaint: SOB    HPI:    Chung Ortiz is a 39 y.o. female with PMH anemia, asthma, SLE, COVID in March admitted with chest tightness cough, wheezing and SOB. She was admitted 5/14-5/16 with similar symptoms. During that admission, she was diagnosed with hypertensive urgency and asthma exacerbation. She had a CT of the chest in the ED that revealed interlobular septal thickening, pleural effusions, groundglass airspace disease concerning for possible pulmonary edema. Subjective:  Patient is being seen for CHF, new cardiomyopathy. There were no acute overnight cardiac events. Today Ms. Lloyd Diver c/o chest tightness but denies shortness of breath or palpitations, feeling better and nervous about dx - discussed today      Wt Readings from Last 3 Encounters:   05/25/21 171 lb 6.4 oz (77.7 kg)   05/16/21 190 lb 12.8 oz (86.5 kg)   11/03/18 185 lb (83.9 kg)          NYHA Class III  Objective:   BP (!) 132/102   Pulse 83   Temp 98.4 °F (36.9 °C) (Temporal)   Resp 20   Ht 5' 2\" (1.575 m)   Wt 171 lb 6.4 oz (77.7 kg)   LMP 05/09/2021   SpO2 100%   BMI 31.35 kg/m²   No intake or output data in the 24 hours ending 05/25/21 0939   Wt Readings from Last 3 Encounters:   05/25/21 171 lb 6.4 oz (77.7 kg)   05/16/21 190 lb 12.8 oz (86.5 kg)   11/03/18 185 lb (83.9 kg)      No intake/output data recorded.       Physical Exam:  General Appearance:  Non-obese/Well Nourished  Respiratory:  · Resp Auscultation: Normal breath sounds without dullness  Cardiovascular:  · Auscultation: Regular rate and rhythm, normal S1S2, no m/g/r/c  · Palpation: Normal    · JVD: none  · Pedal Pulses: 2+ and equal   Abdomen:  · Soft, NT, ND, + bs  Extremities:  · No Cyanosis or Clubbing  · Extremities: negative  Neurological/Psychiatric:  · Oriented to time, place, and person  · Non-anxious MEDICATIONS:   Scheduled Meds:   Scheduled Meds:   buPROPion  300 mg Oral QAM    hydroxychloroquine  200 mg Oral BID    vitamin B-12  500 mcg Oral Daily    Vitamin D  1,000 Units Oral Daily    ascorbic acid  500 mg Oral BID    sodium chloride flush  10 mL Intravenous 2 times per day    enoxaparin  40 mg Subcutaneous Daily    furosemide  40 mg Intravenous BID    albuterol sulfate HFA  2 puff Inhalation 4x daily    ipratropium  2 puff Inhalation 4x daily    predniSONE  10 mg Oral Daily    carvedilol  3.125 mg Oral BID WC    lisinopril  5 mg Oral Daily     Continuous Infusions:   sodium chloride       PRN Meds:.albuterol sulfate HFA, sodium chloride flush, sodium chloride, polyethylene glycol, acetaminophen **OR** acetaminophen, albuterol, meclizine, potassium chloride **OR** potassium alternative oral replacement **OR** potassium chloride, magnesium sulfate, sodium phosphate IVPB **OR** sodium phosphate IVPB, regadenoson  Continuous Infusions:   sodium chloride       No intake or output data in the 24 hours ending 05/25/21 0939    Lab Data:  CBC:   Lab Results   Component Value Date    WBC 7.8 05/25/2021    HGB 11.9 05/25/2021     05/25/2021     BMP:  Lab Results   Component Value Date     05/25/2021    K 3.7 05/25/2021    K 3.7 05/23/2021     05/25/2021    CO2 26 05/25/2021    BUN 15 05/25/2021    CREATININE 0.8 05/25/2021    GLUCOSE 109 05/25/2021     INR: No results found for: INR     CARDIAC LABS  ENZYMES:  Recent Labs     05/23/21 2110   TROPONINI <0.01     FASTING LIPID PANEL:  Lab Results   Component Value Date    TSH 0.34 09/11/2012     LIVER PROFILE:  Lab Results   Component Value Date    AST 52 05/25/2021    AST 88 05/23/2021     05/25/2021     05/23/2021     BNP:   Lab Results   Component Value Date    PROBNP 1,528 05/25/2021    PROBNP 4,158 05/23/2021    PROBNP 1,206 05/14/2021     Iron Studies:  No results found for: FERRITIN  No results found for: IRON, TIBC, FERRITIN   Iron Deficiency Anemia:  No IV Iron Therapy:  No  2017 ACC/AHA HF Guidelines:   intravenous iron replacement in patients with New York Heart Association (NYHA) class II and III HF and iron deficiency(ferritin <100 ng/ml or 100-300 ng/ml if transferrin saturation <20%), to improve functional status and QoL. 1. WEIGHT: Admit Weight: 175 lb (79.4 kg)      Today  Weight: 171 lb 6.4 oz (77.7 kg)   2. I/O No intake or output data in the 24 hours ending 05/25/21 0939    Cardiac Testing:   Echo:  5/24/21:   -The left ventricle is dilated.   -Ejection fraction is visually estimated to be 25%.  -There is mild hypertrophy of the posterior wall.   -There is diffuse global hypokinesis.   -The inferolateral wall appears hypokinetic.   -Grade I diastolic dysfunction with normal LV filling pressures. E/e' =   15.4.   -The left atrium is dilated.   -Trivial aortic regurgitation.   -Moderate to severe mitral regurgitation.   -There is at least moderate tricuspid regurgitation. RVSP = 36 mmHG.   -Mild pulmonic regurgitation present            Assessment/Plan:     1. AHF - BNP improved with IV lasix  2. New cardiomyopathy- plan for [facility] tomorrow to r/o ischemia, on low dose coreg and lisinopril  3.  HTN- controlled          I appreciate the opportunity of cooperating in the care of this individual.    Dallis Spurling, APRN - CNP, ACNP, [address] 5/25/2021, 9:39 AM  Heart Failure  The [address], [facility]  Ph: [phone]      Core Measures:   · Discharge instructions:   · LVEF documented:   · ACEI for LV dysfunction:   · Smoking Cessation:

## 2021-05-25 NOTE — PROGRESS NOTES
[facility] Pulmonary and Critical Care  Progress note      Subjective: Patient sitting in chair in no apparent respite distress. Reports that her breathing has improved. Currently on room air saturating in mid to high 90s. COVID-19 test has come back negative.         The patient is a 39 y.o. female with significant past medical history of:      Diagnosis Date    Anemia     Asthma     no attack since teenager no meds    History of 2019 novel coronavirus disease (COVID-19)     Systemic lupus erythematosus ([address]         Past Surgical History:        Procedure Laterality Date     SECTION, LOW TRANSVERSE N/A     EYE SURGERY      TUBAL LIGATION      reversal     TUBAL LIGATION      TL reversed in      Current Medications:    Current Facility-Administered Medications: albuterol sulfate  (90 Base) MCG/ACT inhaler 2 puff, 2 puff, Inhalation, Q6H PRN  buPROPion (WELLBUTRIN XL) extended release tablet 300 mg, 300 mg, Oral, QAM  hydroxychloroquine (PLAQUENIL) tablet 200 mg, 200 mg, Oral, BID  vitamin B-12 (CYANOCOBALAMIN) tablet 500 mcg, 500 mcg, Oral, Daily  vitamin D (CHOLECALCIFEROL) tablet 1,000 Units, 1,000 Units, Oral, Daily  ascorbic acid (VITAMIN C) tablet 500 mg, 500 mg, Oral, BID  sodium chloride flush 0.9 % injection 10 mL, 10 mL, Intravenous, 2 times per day  sodium chloride flush 0.9 % injection 10 mL, 10 mL, Intravenous, PRN  0.9 % sodium chloride infusion, 25 mL, Intravenous, PRN  enoxaparin (LOVENOX) injection 40 mg, 40 mg, Subcutaneous, Daily  polyethylene glycol (GLYCOLAX) packet 17 g, 17 g, Oral, Daily PRN  acetaminophen (TYLENOL) tablet 650 mg, 650 mg, Oral, Q6H PRN **OR** acetaminophen (TYLENOL) suppository 650 mg, 650 mg, Rectal, Q6H PRN  albuterol (PROVENTIL) nebulizer solution 2.5 mg, 2.5 mg, Nebulization, Q2H PRN  meclizine (ANTIVERT) tablet 25 mg, 25 mg, Oral, TID PRN  furosemide (LASIX) injection 40 mg, 40 mg, Intravenous, BID  potassium chloride (KLOR-CON M) extended release tablet 40 mEq, 40 mEq, Oral, PRN **OR** potassium bicarb-citric acid (EFFER-K) effervescent tablet 40 mEq, 40 mEq, Oral, PRN **OR** potassium chloride 10 mEq/100 mL IVPB (Peripheral Line), 10 mEq, Intravenous, PRN  magnesium sulfate 2000 mg in 50 mL IVPB premix, 2,000 mg, Intravenous, PRN  sodium phosphate 12.69 mmol in dextrose 5 % 250 mL IVPB, 0.16 mmol/kg, Intravenous, PRN **OR** sodium phosphate 25.41 mmol in dextrose 5 % 250 mL IVPB, 0.32 mmol/kg, Intravenous, PRN  albuterol sulfate  (90 Base) MCG/ACT inhaler 2 puff, 2 puff, Inhalation, 4x daily  ipratropium (ATROVENT HFA) 17 MCG/ACT inhaler 2 puff, 2 puff, Inhalation, 4x daily  predniSONE (DELTASONE) tablet 10 mg, 10 mg, Oral, Daily  carvedilol (COREG) tablet 3.125 mg, 3.125 mg, Oral, BID WC  lisinopril (PRINIVIL;ZESTRIL) tablet 5 mg, 5 mg, Oral, Daily  regadenoson (LEXISCAN) injection 0.4 mg, 0.4 mg, Intravenous, ONCE PRN    Allergies   Allergen Reactions    Shellfish-Derived Products Hives    Penicillins Hives       Social History:    TOBACCO:   reports that she has never smoked. She has never used smokeless tobacco.  ETOH:   reports no history of alcohol use.   Patient currently lives independently    Family History:       Problem Relation Age of Onset    Early Death Mother     High Blood Pressure Mother     Substance Abuse Mother     Cancer Father     Substance Abuse Father        REVIEW OF SYSTEMS:    Constitutional: negative for fatigue, fevers, malaise and weight loss  Ears, nose, mouth, throat: negative for ear drainage, epistaxis, hoarseness, nasal congestion, sore throat and voice change  Respiratory: negative except for shortness of breath  Cardiovascular: negative for chest pain, chest pressure/discomfort, irregular heart beat, lower extremity edema and palpitations  Gastrointestinal: negative for abdominal pain, constipation, diarrhea, jaundice, melena, odynophagia, reflux symptoms and vomiting  Hematologic/lymphatic: negative for bleeding, easy bruising, lymphadenopathy and petechiae  Musculoskeletal:negative for arthralgias, bone pain, muscle weakness, neck pain and stiff joints  Neurological: negative for dizziness, gait problems, headaches, seizures, speech problems, tremors and weakness  Behavioral/Psych: negative for anxiety, behavior problems, depression, fatigue and sleep disturbance  Endocrine: negative for diabetic symptoms including none, neuropathy, polyphagia, polyuria, polydipsia, vomiting and diarrhea and temperature intolerance  Allergic/Immunologic: negative for anaphylaxis, angioedema, hay fever and urticaria      Objective:     Patient Vitals for the past 8 hrs:   BP Temp Temp src Pulse Resp SpO2   05/25/21 1005 (!) 134/97 98.2 °F (36.8 °C) Temporal 91 15 100 %   05/25/21 0933      100 %   05/25/21 0415  98.4 °F (36.9 °C) Temporal  20 100 %     No intake/output data recorded.   I/O this shift:  In: 10 [I.V.:10]  Out: -     Physical Exam:  General Appearance: alert and oriented to person, place and time, well developed and well- nourished, in no acute distress  Skin: warm and dry, no rash or erythema  Head: normocephalic and atraumatic  Eyes: pupils equal, round, and reactive to light, extraocular eye movements intact, conjunctivae normal  ENT: external ear and ear canal normal bilaterally, nose without deformity, nasal mucosa and turbinates normal  Neck: supple and non-tender without mass, no cervical lymphadenopathy  Pulmonary/Chest: rales present-bilateral, scattered  Cardiovascular: normal rate, regular rhythm,  no murmurs, rubs, distal pulses intact, no carotid bruits  Abdomen: soft, non-tender, non-distended, normal bowel sounds, no masses or organomegaly  Lymph Nodes: Cervical, supraclavicular normal  Extremities: no cyanosis, clubbing or edema  Musculoskeletal: normal range of motion, no joint swelling, deformity or tenderness  Neurologic: alert, no focal neurologic deficits    Data Review:  LABS:  Recent Labs     05/23/21 2110 05/25/21  0432   WBC 6.6 7.8   HGB 12.0 11.9*   HCT 36.4 36.0    285   * 108*   AST 88* 52*   * 136   K 3.7 3.7    101   CREATININE 0.7 0.8   BUN 15 15   CO2 23 26       Recent Labs     05/23/21 2110 05/25/21  0432   GLUCOSE 142* 109*   CALCIUM 9.0 9.2   * 136   K 3.7 3.7   CO2 23 26    101   BUN 15 15   CREATININE 0.7 0.8       No results for input(s): PHART, VBD7YLY, PO2ART, MCO6MMS, R0ZYBSMW, BEART, P6UIRZWE in the last 72 hours. No results found for: INR, PROTIME  No results found for: AMYLASE   No results found for: LABA1C  No results found for: EAG  Lab Results   Component Value Date    TSH 0.34 (L) 09/11/2012    T4FREE 0.95 09/11/2012    T4FREE 0.95 09/11/2012     Lab Results   Component Value Date    TROPONINI <0.01 05/23/2021      No results found for: CRP   No results found for: BNP   No results found for: DDIMER   No results found for: FERRITIN   No results found for: [address]      Radiology: All pertinent images / reports were reviewed as a part of this visit. Narrative   EXAMINATION:   CT OF THE CHEST WITHOUT CONTRAST 5/24/2021 12:03 am       TECHNIQUE:   CT of the chest was performed without the administration of intravenous   contrast. Multiplanar reformatted images are provided for review.  Dose   modulation, iterative reconstruction, and/or weight based adjustment of the   mA/kV was utilized to reduce the radiation dose to as low as reasonably   achievable.       COMPARISON:   CT chest 05/14/2021 and chest x-ray 05/23/2021.       HISTORY:   ORDERING SYSTEM PROVIDED HISTORY: elevated BNP, wheezing   TECHNOLOGIST PROVIDED HISTORY:   Reason for exam:->elevated BNP, wheezing   Decision Support Exception - unselect if not a suspected or confirmed   emergency medical condition->Emergency Medical Condition (MA)   Is the patient pregnant?->No   Reason for Exam: Shortness of Breath (Pt reports increasing SOB after having   Covid in March, states it feels like she's suffocating if she lies flat. Feels like her hands and feet are numb. )   Acuity: Acute   Type of Exam: Initial       FINDINGS:   Mediastinum: Heart is enlarged.  No pericardial effusion. Juanita Holbrook is within   normal limits in size.  Pulmonary arteries are upper normal limits in size. .       Lungs/pleura: Central airways are patent. Diffuse interlobular septal   thickening.  Scattered ground-glass opacities.  Scattered nodular airspace   disease.  Small pleural effusions.  No pneumothorax.       Soft Tissues/Bones: Suboptimal evaluation for adenopathy without intravenous   contrast. Residual thymic tissue noted.  Scattered degenerative changes noted   in the visualized spine without spondylolisthesis.       Upper Abdomen: Partially visualized too small to characterize low-attenuation   focus in the liver.           Impression   1. Interlobular septal thickening, pleural effusions, and ground-glass   airspace disease could represent pulmonary edema given indication.  Correlate   clinically to exclude superimposed pneumonia. 2. Other findings as described. Problem List:   Acute respiratory failure, resolving  Abnormal CT imaging  CHF, unspecified    Assessment/Plan:     -Patient presented to the hospital with acute respiratory distress. Bilateral patchy lung infiltrates with bilateral effusions were seen.  -Echo done yesterday showed ejection fraction of 25% with diffuse global hypokinesis. Possible differential would include Covid cardiomyopathy versus lupus related myocarditis. Cardiology on board. Recommending Lillian Gooden. Patient scheduled to undergo testing today.  -Also on carvedilol and Lasix which is made the difference.  -Not requiring any oxygen support at this time.  -Steroids have been stopped. -COVID-19 testing was negative. Nothing further to add from critical care standpoint. We will sign off.     Albino Mobley MD

## 2021-05-25 NOTE — PROGRESS NOTES
Transferred from , arrived around [address] in bed. Alert and oriented x4, pleasant, ambulatory. Noted with occasional dry non-productive cough, SOB on exertion. C/O level 3/10 chest pain, says pain is tolerable. NPO for stress test later today. VSS. Assessment completed, see flow charts. No distress noted. celine

## 2021-05-25 NOTE — PROGRESS NOTES
Hospitalist Progress Note      PCP: Marietta Lin MD    Date of Admission: 5/23/2021    Chief Complaint: SOB    Hospital Course: 38 yo F with SLE, asthma, obesity, recent COVID 19 PNA in March 2021 who came to ER with complaints of SOB. Patient admitted as inpatient for acute pulmonary edema with acute respiratory failure with hypoxia and acute asthma exacerbsation. Followed by Cardio and Pulm. Diuresed with IV Lasix. Started on IV steroids and then stopped by Pulm. Echo The left ventricle is dilated. -Ejection fraction is visually estimated to be 25%. -There is mild hypertrophy of the posterior wall.   -There is diffuse global hypokinesis. -The inferolateral wall appears hypokinetic.   -Grade I diastolic dysfunction with normal LV filling pressures. E/e' =   15.4.   -The left atrium is dilated. -Trivial aortic regurgitation.   -Moderate to severe mitral regurgitation.   -There is at least moderate tricuspid regurgitation. RVSP = 36 mmHG. -Mild pulmonic regurgitation present. For [facility] on 5/25. Subjective: Patient with less coughing. Less SOB. No CP, HA or fevers. No abdominal pain or diarrhea.       Medications:  Reviewed    Infusion Medications    sodium chloride       Scheduled Medications    buPROPion  300 mg Oral QAM    hydroxychloroquine  200 mg Oral BID    vitamin B-12  500 mcg Oral Daily    Vitamin D  1,000 Units Oral Daily    ascorbic acid  500 mg Oral BID    sodium chloride flush  10 mL Intravenous 2 times per day    enoxaparin  40 mg Subcutaneous Daily    furosemide  40 mg Intravenous BID    albuterol sulfate HFA  2 puff Inhalation 4x daily    ipratropium  2 puff Inhalation 4x daily    predniSONE  10 mg Oral Daily    carvedilol  3.125 mg Oral BID WC    lisinopril  5 mg Oral Daily     PRN Meds: albuterol sulfate HFA, sodium chloride flush, sodium chloride, polyethylene glycol, acetaminophen **OR** acetaminophen, albuterol, meclizine, potassium chloride **OR** Labs     05/23/21 2110   TROPONINI <0.01       Urinalysis:      Lab Results   Component Value Date    NITRU Negative 05/24/2021    WBCUA 3 05/24/2021    BACTERIA 1+ 05/24/2021    RBCUA 0-2 05/24/2021    BLOODU Negative 05/24/2021    SPECGRAV 1.028 05/24/2021    GLUCOSEU Negative 05/24/2021       Radiology:  CT CHEST WO CONTRAST   Final Result   1. Interlobular septal thickening, pleural effusions, and ground-glass   airspace disease could represent pulmonary edema given indication. Correlate   clinically to exclude superimposed pneumonia. 2. Other findings as described. XR CHEST PORTABLE   Final Result   Bilateral pulmonary opacities concerning for multifocal viral pneumonia. Assessment/Plan:    Active Hospital Problems    Diagnosis     Acute pulmonary edema (HCC) [J81.0]     Prolonged QT interval [R94.31]     SLE (systemic lupus erythematosus) (HCC) [M32.9]     Asthma [J45.909]     Obesity (BMI 30-39. 9) [E66.9]     Acute respiratory failure with hypoxia (HCC) [J96.01]     Pneumonia due to COVID-19 virus [U07.1, J12.82]     Pneumonia due to organism [J18.9]     Acute systolic (congestive) heart failure (HCC) [I50.21]     SOB (shortness of breath) [R06.02]      1. Cont Lasix 40 mg IV bid  2. Solumedrol 40 mg IV q6h to Prednisone 10 mg daily  3. NPO for LHC  4. Cardio consult for CHF appreciated  5. Pulm consult for SOB appreciated  6. Wean O2 as tolerated  7. Albuterol and Atrovent HFA     DVT Prophylaxis: Lovenox  Diet: Diet NPO, After Midnight  Code Status: Full Code    PT/OT Eval Status: Not needed    Dispo - Home    Discussed with patient, Dr Leslye Hurd (Cardio), nursing and CM. LHC is indicated here.     Quintin Butler MD

## 2021-05-26 VITALS
WEIGHT: 172.2 LBS | OXYGEN SATURATION: 97 % | HEIGHT: 62 IN | TEMPERATURE: 98.1 F | SYSTOLIC BLOOD PRESSURE: 118 MMHG | BODY MASS INDEX: 31.69 KG/M2 | DIASTOLIC BLOOD PRESSURE: 81 MMHG | HEART RATE: 103 BPM | RESPIRATION RATE: 16 BRPM

## 2021-05-26 PROBLEM — I34.0 NONRHEUMATIC MITRAL VALVE REGURGITATION: Status: ACTIVE | Noted: 2021-05-26

## 2021-05-26 LAB
A/G RATIO: 1.4 (ref 1.1–2.2)
ALBUMIN SERPL-MCNC: 4.1 G/DL (ref 3.4–5)
ALP BLD-CCNC: 89 U/L (ref 40–129)
ALT SERPL-CCNC: 130 U/L (ref 10–40)
ANION GAP SERPL CALCULATED.3IONS-SCNC: 14 MMOL/L (ref 3–16)
AST SERPL-CCNC: 53 U/L (ref 15–37)
BASOPHILS ABSOLUTE: 0 K/UL (ref 0–0.2)
BASOPHILS RELATIVE PERCENT: 0.3 %
BILIRUB SERPL-MCNC: 0.5 MG/DL (ref 0–1)
BUN BLDV-MCNC: 18 MG/DL (ref 7–20)
CALCIUM SERPL-MCNC: 9.6 MG/DL (ref 8.3–10.6)
CHLORIDE BLD-SCNC: 99 MMOL/L (ref 99–110)
CO2: 24 MMOL/L (ref 21–32)
CREAT SERPL-MCNC: 0.9 MG/DL (ref 0.6–1.1)
CULTURE, RESPIRATORY: NORMAL
EOSINOPHILS ABSOLUTE: 0 K/UL (ref 0–0.6)
EOSINOPHILS RELATIVE PERCENT: 0.1 %
GFR AFRICAN AMERICAN: >60
GFR NON-AFRICAN AMERICAN: >60
GLOBULIN: 3 G/DL
GLUCOSE BLD-MCNC: 131 MG/DL (ref 70–99)
GRAM STAIN RESULT: NORMAL
HCT VFR BLD CALC: 39.2 % (ref 36–48)
HEMOGLOBIN: 12.8 G/DL (ref 12–16)
LYMPHOCYTES ABSOLUTE: 0.8 K/UL (ref 1–5.1)
LYMPHOCYTES RELATIVE PERCENT: 8.9 %
MAGNESIUM: 1.9 MG/DL (ref 1.8–2.4)
MCH RBC QN AUTO: 32.4 PG (ref 26–34)
MCHC RBC AUTO-ENTMCNC: 32.7 G/DL (ref 31–36)
MCV RBC AUTO: 99.1 FL (ref 80–100)
MONOCYTES ABSOLUTE: 0.6 K/UL (ref 0–1.3)
MONOCYTES RELATIVE PERCENT: 7 %
NEUTROPHILS ABSOLUTE: 7.2 K/UL (ref 1.7–7.7)
NEUTROPHILS RELATIVE PERCENT: 83.7 %
PDW BLD-RTO: 15.3 % (ref 12.4–15.4)
PHOSPHORUS: 4.1 MG/DL (ref 2.5–4.9)
PLATELET # BLD: 318 K/UL (ref 135–450)
PMV BLD AUTO: 8.6 FL (ref 5–10.5)
POTASSIUM SERPL-SCNC: 3.8 MMOL/L (ref 3.5–5.1)
RBC # BLD: 3.96 M/UL (ref 4–5.2)
SODIUM BLD-SCNC: 137 MMOL/L (ref 136–145)
TOTAL PROTEIN: 7.1 G/DL (ref 6.4–8.2)
WBC # BLD: 8.6 K/UL (ref 4–11)

## 2021-05-26 PROCEDURE — 99223 1ST HOSP IP/OBS HIGH 75: CPT | Performed by: INTERNAL MEDICINE

## 2021-05-26 PROCEDURE — 6370000000 HC RX 637 (ALT 250 FOR IP): Performed by: INTERNAL MEDICINE

## 2021-05-26 PROCEDURE — 94640 AIRWAY INHALATION TREATMENT: CPT

## 2021-05-26 PROCEDURE — 99232 SBSQ HOSP IP/OBS MODERATE 35: CPT | Performed by: NURSE PRACTITIONER

## 2021-05-26 PROCEDURE — 84100 ASSAY OF PHOSPHORUS: CPT

## 2021-05-26 PROCEDURE — 2580000003 HC RX 258: Performed by: INTERNAL MEDICINE

## 2021-05-26 PROCEDURE — 94761 N-INVAS EAR/PLS OXIMETRY MLT: CPT

## 2021-05-26 PROCEDURE — 85025 COMPLETE CBC W/AUTO DIFF WBC: CPT

## 2021-05-26 PROCEDURE — 80053 COMPREHEN METABOLIC PANEL: CPT

## 2021-05-26 PROCEDURE — 83735 ASSAY OF MAGNESIUM: CPT

## 2021-05-26 PROCEDURE — 36415 COLL VENOUS BLD VENIPUNCTURE: CPT

## 2021-05-26 PROCEDURE — 99233 SBSQ HOSP IP/OBS HIGH 50: CPT | Performed by: INTERNAL MEDICINE

## 2021-05-26 RX ORDER — PREDNISONE 10 MG/1
10 TABLET ORAL DAILY
Qty: 10 TABLET | Refills: 0 | Status: SHIPPED | OUTPATIENT
Start: 2021-05-26 | End: 2021-06-05

## 2021-05-26 RX ORDER — CARVEDILOL 3.12 MG/1
3.12 TABLET ORAL 2 TIMES DAILY WITH MEALS
Qty: 60 TABLET | Refills: 0 | Status: SHIPPED | OUTPATIENT
Start: 2021-05-26 | End: 2021-06-01

## 2021-05-26 RX ORDER — FUROSEMIDE 40 MG/1
40 TABLET ORAL DAILY
Qty: 30 TABLET | Refills: 0 | Status: SHIPPED | OUTPATIENT
Start: 2021-05-26 | End: 2021-06-15

## 2021-05-26 RX ORDER — LISINOPRIL 5 MG/1
5 TABLET ORAL DAILY
Qty: 30 TABLET | Refills: 0 | Status: SHIPPED | OUTPATIENT
Start: 2021-05-26 | End: 2021-06-15 | Stop reason: SDUPTHER

## 2021-05-26 RX ADMIN — OXYCODONE HYDROCHLORIDE AND ACETAMINOPHEN 500 MG: 500 TABLET ORAL at 09:23

## 2021-05-26 RX ADMIN — Medication 10 ML: at 09:24

## 2021-05-26 RX ADMIN — LISINOPRIL 5 MG: 5 TABLET ORAL at 09:22

## 2021-05-26 RX ADMIN — CARVEDILOL 3.12 MG: 3.12 TABLET, FILM COATED ORAL at 09:23

## 2021-05-26 RX ADMIN — BUPROPION HYDROCHLORIDE 300 MG: 300 TABLET, FILM COATED, EXTENDED RELEASE ORAL at 09:23

## 2021-05-26 RX ADMIN — HYDROXYCHLOROQUINE SULFATE 200 MG: 200 TABLET ORAL at 09:23

## 2021-05-26 RX ADMIN — Medication 2 PUFF: at 12:31

## 2021-05-26 RX ADMIN — Medication 1000 UNITS: at 09:22

## 2021-05-26 RX ADMIN — Medication 2 PUFF: at 08:33

## 2021-05-26 RX ADMIN — FUROSEMIDE 40 MG: 40 TABLET ORAL at 09:22

## 2021-05-26 RX ADMIN — CYANOCOBALAMIN TAB 1000 MCG 500 MCG: 1000 TAB at 09:23

## 2021-05-26 RX ADMIN — PREDNISONE 10 MG: 10 TABLET ORAL at 09:23

## 2021-05-26 ASSESSMENT — PAIN SCALES - GENERAL
PAINLEVEL_OUTOF10: 0

## 2021-05-26 NOTE — PROGRESS NOTES
CLINICAL PHARMACY NOTE: MEDS TO BEDS    Total # of Prescriptions Filled: 4   The following medications were delivered to the patient:  · Prednisone 10mg  · Carvedilol 3.125mg  · Lisinopril 5mg  · Furosemide 40mg    Additional Documentation:  Medications delivered- patient signed  Blayne Benton Good

## 2021-05-26 NOTE — DISCHARGE SUMMARY
Hospital Medicine Discharge Summary    Patient: Elaine Diaz     Gender: female  : 1975   Age: 39 y.o. MRN: [id number]    Admitting Physician: Kaushal Mina MD  Discharge Physician: Alf Cano MD     Code Status: Full Code     Admit Date: 2021   Discharge Date:   21    Disposition:  Home    Discharge Diagnoses: Active Hospital Problems    Diagnosis Date Noted    Nonrheumatic mitral valve regurgitation [I34.0] 2021     Priority: High    Dilated cardiomyopathy ([facility] [I42.0] 2021     Priority: High    Precordial pain [R07.2] 2021     Priority: High    Acute pulmonary edema (HCC) [J81.0] 2021    Prolonged QT interval [R94.31] 2021    SLE (systemic lupus erythematosus) ([facility] [M32.9] 2021    Asthma [J45.909] 2021    Obesity (BMI 30-39. 9) [E66.9] 2021    Acute respiratory failure with hypoxia (HCC) [J96.01] 2021    Pneumonia due to COVID-19 virus [U07.1, J12.82] 2021    Pneumonia due to organism [J18.9]     Acute systolic (congestive) heart failure (HCC) [I50.21]     SOB (shortness of breath) [R06.02]        Follow-up appointments:  one week    Outpatient to do list: F/U with PCP, Cardio, EP, CHF, Pulm and Rheum    Condition at Discharge:  [facility] Course:   38 yo F with SLE, asthma, obesity, recent COVID 19 PNA in 2021 who came to ER with complaints of SOB. Patient admitted as inpatient for acute pulmonary edema with acute respiratory failure with hypoxia and acute asthma exacerbsation. Followed by Cardio and Pulm. Diuresed with IV Lasix. Started on IV steroids and then stopped by Pulm. Echo The left ventricle is dilated.   -Ejection fraction is visually estimated to be 25%.  -There is mild hypertrophy of the posterior wall.   -There is diffuse global hypokinesis.   -The inferolateral wall appears hypokinetic.   -Grade I diastolic dysfunction with normal LV filling pressures.  E/e' =  15.4.   -The left atrium is dilated.   -Trivial aortic regurgitation.   -Moderate to severe mitral regurgitation.   -There is at least moderate tricuspid regurgitation. RVSP = 36 mmHG.   -Mild pulmonic regurgitation present. S/P LHC on 5/25 Anatomy:   LM-normal  LAD-normal  Cx-normal  OM1- normal  RCA-normal  Left dominant  LVEF-25%, 3+ MR        Hemodynamics:  RA- 4/3 mean 0  RV-28/-1, 4  PAWP-9/4 mean 5  PA- 27/10 mean 17     C. O. 6.1        Impression:  1. Normal coronary arteries. 2.  Severe LV systolic dysfunction. 3.  Moderate to severe mitral regurgitation. 4.  Normal right-sided pressures.     Plan:  1. Medical management. 2.  Consult EP regarding potential evaluation for LifeVest.    Will be discharged with LifeVest given new CMP and prolonged QTc. Will be on Coreg, Lisinopril and Lasix. Cardio to address Aldactone in follow up. May need Cardiac MRI, this will be addressed in f/u with Cardio. Will be discharged on Prednisone 10 mg daily. This should be monitored by Pulm and Rheum.     Discharge Medications:   Current Discharge Medication List      START taking these medications    Details   lisinopril (PRINIVIL;ZESTRIL) 5 MG tablet Take 1 tablet by mouth daily  Qty: 30 tablet, Refills: 0      carvedilol (COREG) 3.125 MG tablet Take 1 tablet by mouth 2 times daily (with meals)  Qty: 60 tablet, Refills: 0      furosemide (LASIX) 40 MG tablet Take 1 tablet by mouth daily  Qty: 30 tablet, Refills: 0           Current Discharge Medication List      CONTINUE these medications which have CHANGED    Details   predniSONE (DELTASONE) 10 MG tablet Take 1 tablet by mouth daily for 10 days  Qty: 10 tablet, Refills: 0           Current Discharge Medication List      CONTINUE these medications which have NOT CHANGED    Details   albuterol sulfate  (90 Base) MCG/ACT inhaler Inhale 2 puffs into the lungs every 6 hours as needed for Shortness of Breath  Qty: 1 Inhaler, Refills: 0 hydroxychloroquine (PLAQUENIL) 200 MG tablet Take 200 mg by mouth 2 times daily      buPROPion (WELLBUTRIN XL) 300 MG extended release tablet Take 300 mg by mouth every morning      vitamin B-12 (CYANOCOBALAMIN) 500 MCG tablet Take 500 mcg by mouth daily      vitamin D (CHOLECALCIFEROL) 25 MCG (1000 UT) TABS tablet Take 1,000 Units by mouth daily      meloxicam (MOBIC) 15 MG tablet Take 15 mg by mouth daily      dicyclomine (BENTYL) 10 MG capsule Take 1 capsule by mouth every 6 hours as needed (cramps)  Qty: 20 capsule, Refills: 0      ondansetron (ZOFRAN ODT) 4 MG disintegrating tablet Take 1-2 tablets by mouth every 12 hours as needed for Nausea May Sub regular tablet (non-ODT) if insurance does not cover ODT. Qty: 12 tablet, Refills: 0           Current Discharge Medication List      STOP taking these medications       metoprolol tartrate (LOPRESSOR) 25 MG tablet Comments:   Reason for Stopping:         LORazepam (ATIVAN) 0.5 MG tablet Comments:   Reason for Stopping:               Discharge Exam:    /81   Pulse 103   Temp 98.1 °F (36.7 °C) (Oral)   Resp 16   Ht 5' 2\" (1.575 m)   Wt 172 lb 3.2 oz (78.1 kg)   LMP 05/09/2021   SpO2 98%   BMI 31.50 kg/m²   General appearance: No apparent distress, appears stated age and cooperative. HEENT: Pupils equal, round, and reactive to light. Conjunctivae/corneas clear. Neck: Supple, with full range of motion. No jugular venous distention. Trachea midline. Respiratory:  Poor AE BL. Scattered BL rales and wheezing resolving. No rhonchi. Cardiovascular: Regular rate and rhythm with normal S1/S2 without murmurs, rubs or gallops. Abdomen: Soft, non-tender, non-distended with normal bowel sounds. Musculoskeletal: No clubbing, cyanosis or edema bilaterally. Full range of motion without deformity. Skin: Skin color, texture, turgor normal.  No rashes or lesions. Neurologic:  Neurovascularly intact without any focal sensory/motor deficits.  Cranial nerves: II-XII intact, grossly non-focal.  Psychiatric: Alert and oriented, thought content appropriate, normal insight  Capillary Refill: Brisk,3 seconds, normal   Peripheral Pulses: +2 palpable, equal bilaterally     Labs: For convenience and continuity at follow-up the following most recent labs are provided:    Lab Results   Component Value Date    WBC 8.6 05/26/2021    HGB 12.8 05/26/2021    HCT 39.2 05/26/2021    MCV 99.1 05/26/2021     05/26/2021     05/26/2021    K 3.8 05/26/2021    K 3.7 05/23/2021    CL 99 05/26/2021    CO2 24 05/26/2021    BUN 18 05/26/2021    CREATININE 0.9 05/26/2021    CALCIUM 9.6 05/26/2021    PHOS 4.1 05/26/2021    ALKPHOS 89 05/26/2021     05/26/2021    AST 53 05/26/2021    BILITOT 0.5 05/26/2021    BILIDIR <0.2 10/06/2015    LABALBU 4.1 05/26/2021     No results found for: INR    Radiology:  ECHO Complete 2D W Doppler W Color    Result Date: 5/24/2021  Transthoracic Echocardiography Report (TTE)  Demographics   Patient Name       Trena Solomon   Date of Study      05/24/2021         Gender              Female   Patient Number     [id number]         Date of Birth       1975   Visit Number       [id number]          Age                 39 year(s)   Accession Number   [id number]         Room Number         7026   Corporate ID       [id number]            Pan Hobbs RDCS   Ordering Physician Fercho Noel MD, MD                 Physician           [facility]  Procedure Type of Study   TTE procedure:ECHOCARDIOGRAM COMPLETE 2D W DOPPLER W COLOR. Procedure Date Date: 05/24/2021 Start: 01:35 PM Study Location: [facility] - Echo Lab Technical Quality: Good visualization Additional Indications:Possible CHF.  Patient Status: Routine Height: 62 inches Weight: 175 pounds BSA: 1.81 m2 BMI: 32.01 kg/m2 BP: 119/86 mmHg  Conclusions Summary  -The left ventricle is dilated. -Ejection fraction is visually estimated to be 25%. -There is mild hypertrophy of the posterior wall.  -There is diffuse global hypokinesis. -The inferolateral wall appears hypokinetic.  -Grade I diastolic dysfunction with normal LV filling pressures. E/e' =  15.4.  -The left atrium is dilated. -Trivial aortic regurgitation.  -Moderate to severe mitral regurgitation.  -There is at least moderate tricuspid regurgitation. RVSP = 36 mmHG. -Mild pulmonic regurgitation present. Signature   ------------------------------------------------------------------  Electronically signed by Silvia Beckford MD, [facility] (Interpreting  physician) on 05/24/2021 at 03:30 PM  ------------------------------------------------------------------   Findings   Left Ventricle  The left ventricle is dilated. Ejection fraction is visually estimated to be 25%. There is mild hypertrophy of the posterior wall. There is diffuse global hypokinesis. The inferolateral wall appears hypokinetic. Grade I diastolic dysfunction with normal LV filling pressures. E/e' = 15.4. The   Mitral Valve  Mitral valve is structurally normal.  Moderate to severe mitral regurgitation. Left Atrium  The left atrium is dilated. Aortic Valve  The aortic valve is structurally normal.  Trivial aortic regurgitation. Aorta  The aortic root is normal in size. Right Ventricle  The right ventricle is normal in size and function. TAPSe = 2.0 cm. RVS velocity is 11.2 cm/s. RVSP = 36 mmHG. Tricuspid Valve  Tricuspid valve is structurally normal.  There is at least moderate tricuspid regurgitation. Right Atrium  The right atrial size is normal.   Pulmonic Valve  The pulmonic valve appears structurally normal.  Mild pulmonic regurgitation present. Pericardial Effusion  No pericardial effusion noted. Pleural Effusion  No pleural effusion.    Miscellaneous  IVC size is normal (<2.1cm) and collapses > 50% with respiration consistent  with normal RA pressure (3mmHg). M-Mode/2D Measurements (cm)   LV Diastolic Dimension: 1.18 cm LV Systolic Dimension: 9.72 cm  LV Septum Diastolic: 9.21 cm  LV PW Diastolic: 3.34 cm        AO Root Dimension: 3 cm                                  LA Dimension: 2.9 cm                                  LA Area: 18.1 cm2  LVOT: 2.1 cm                    LA volume/Index: 54.1 ml /30 ml/m2  Doppler Measurements   AV Peak Velocity: 112 cm/s     MV Peak E-Wave: 116 cm/s  AV Peak Gradient: 5.02 mmHg    MV Peak A-Wave: 51.8 cm/s  AV Mean Gradient: 3 mmHg       MV E/A Ratio: 2.24  LVOT Peak Velocity: 87.5 cm/s  AV Area (Continuity):2.74 cm2  MV Max P mmHg                                 MV Vmax:332 cm/s  TR Velocity:286 cm/s  TR Gradient:32.72 mmHg  Estimated RAP:3 mmHg  Estimated RVSP: 36 mmHg  E' Septal Velocity: 6.53 cm/s  E' Lateral Velocity: 8.92 cm/s   Aortic Valve   Peak Velocity: 112 cm/s     Mean Velocity: 81.6 cm/s  Peak Gradient: 5.02 mmHg    Mean Gradient: 3 mmHg  Area (continuity): 2.74 cm2  AV VTI: 16.2 cm  Aorta   Aortic Root: 3 cm  LVOT Diameter: 2.1 cm      CT HEAD WO CONTRAST    Result Date: 2021  EXAMINATION: CTA OF THE HEAD AND NECK WITH CONTRAST; CT OF THE HEAD WITHOUT CONTRAST 2021 9:29 pm; 2021 9:24 pm: TECHNIQUE: CTA of the head and neck was performed with the administration of intravenous contrast. Multiplanar reformatted images are provided for review. MIP images are provided for review. Stenosis of the internal carotid arteries measured using NASCET criteria. Dose modulation, iterative reconstruction, and/or weight based adjustment of the mA/kV was utilized to reduce the radiation dose to as low as reasonably achievable.; CT of the head was performed without the administration of intravenous contrast. Dose modulation, iterative reconstruction, and/or weight based adjustment of the mA/kV was utilized to reduce the radiation dose to as low as reasonably achievable. Noncontrast CT of the head with reconstructed 2-D images are also provided for review. COMPARISON: None. HISTORY: ORDERING SYSTEM PROVIDED HISTORY: atypical headache, patient has lupus, family history of brain aneurysms. TECHNOLOGIST PROVIDED HISTORY: Reason for exam:->atypical headache, patient has lupus, family history of brain aneurysms. Decision Support Exception - unselect if not a suspected or confirmed emergency medical condition->Emergency Medical Condition (MA) Reason for Exam: Atypical headache, patient has lupus, family history of brain aneurysms. Acuity: Acute Type of Exam: Initial; ORDERING SYSTEM PROVIDED HISTORY: posterior head ache (pressure) TECHNOLOGIST PROVIDED HISTORY: Reason for exam:->posterior head ache (pressure) Has a \"code stroke\" or \"stroke alert\" been called? ->No Decision Support Exception - unselect if not a suspected or confirmed emergency medical condition->Emergency Medical Condition (MA) Is the patient pregnant?->No Reason for Exam: Posterior head ache (pressure). Shortness of Breath (pt states hasn't been feeling well and suddenly became sob today. hx of lupus and states \"my lungs are not good\". pt having a hard time talking in between breaths during triage. ). Acuity: Acute Type of Exam: Initial FINDINGS: CT HEAD: BRAIN/VENTRICLES:  No acute intracranial hemorrhage or extraaxial fluid collection. Grey-white differentiation is maintained. No evidence of mass, mass effect or midline shift. No evidence of hydrocephalus. ORBITS: The visualized portion of the orbits demonstrate no acute abnormality. SINUSES:  The visualized paranasal sinuses and mastoid air cells demonstrate no acute abnormality. SOFT TISSUES/SKULL: No acute abnormality of the visualized skull or soft tissues. 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 CTA NECK: AORTIC ARCH/ARCH VESSELS: No dissection or arterial injury. No significant stenosis of the brachiocephalic or subclavian arteries.  CAROTID ARTERIES: No dissection, arterial injury, or hemodynamically significant stenosis by NASCET criteria. VERTEBRAL ARTERIES: No dissection, arterial injury, or significant stenosis. SOFT TISSUES: Scattered small ground-glass densities are identified within both upper lobes which may represent a mild infectious/inflammatory process. Mild right hilar adenopathy is identified likely reactive. The larynx and pharynx are unremarkable. No acute abnormality of the salivary and thyroid glands. BONES: No acute osseous abnormality. CTA HEAD: ANTERIOR CIRCULATION: No significant stenosis of the intracranial internal carotid, anterior cerebral, or middle cerebral arteries. No aneurysm. POSTERIOR CIRCULATION: No significant stenosis of the vertebral, basilar, or posterior cerebral arteries. No aneurysm. OTHER: No dural venous sinus thrombosis on this non-dedicated study     1. No acute intracranial abnormality. Unremarkable noncontrast CT of the head 2. No intracranial aneurysm or large vessel occlusion 3. No significant carotid stenosis or vertebral artery abnormality. 4. Small ground-glass densities within both upper lobes which may represent a mild infectious/inflammatory process. 5. Mild right hilar adenopathy is partially visualized and is likely reactive     CT CHEST WO CONTRAST    Result Date: 5/24/2021  EXAMINATION: CT OF THE CHEST WITHOUT CONTRAST 5/24/2021 12:03 am TECHNIQUE: CT of the chest was performed without the administration of intravenous contrast. Multiplanar reformatted images are provided for review. Dose modulation, iterative reconstruction, and/or weight based adjustment of the mA/kV was utilized to reduce the radiation dose to as low as reasonably achievable. COMPARISON: CT chest 05/14/2021 and chest x-ray 05/23/2021.  HISTORY: ORDERING SYSTEM PROVIDED HISTORY: elevated BNP, wheezing TECHNOLOGIST PROVIDED HISTORY: Reason for exam:->elevated BNP, wheezing Decision Support Exception - unselect if not a suspected or confirmed emergency medical condition->Emergency Medical Condition (MA) Is the patient pregnant?->No Reason for Exam: Shortness of Breath (Pt reports increasing SOB after having Covid in March, states it feels like she's suffocating if she lies flat. Feels like her hands and feet are numb. ) Acuity: Acute Type of Exam: Initial FINDINGS: Mediastinum: Heart is enlarged. No pericardial effusion. Aorta is within normal limits in size. Pulmonary arteries are upper normal limits in size. . Lungs/pleura: Central airways are patent. Diffuse interlobular septal thickening. Scattered ground-glass opacities. Scattered nodular airspace disease. Small pleural effusions. No pneumothorax. Soft Tissues/Bones: Suboptimal evaluation for adenopathy without intravenous contrast. Residual thymic tissue noted. Scattered degenerative changes noted in the visualized spine without spondylolisthesis. Upper Abdomen: Partially visualized too small to characterize low-attenuation focus in the liver. 1. Interlobular septal thickening, pleural effusions, and ground-glass airspace disease could represent pulmonary edema given indication. Correlate clinically to exclude superimposed pneumonia. 2. Other findings as described. XR CHEST PORTABLE    Result Date: 5/23/2021  EXAMINATION: ONE XRAY VIEW OF THE CHEST 5/23/2021 9:00 pm COMPARISON: CT chest 05/14/2021 HISTORY: ORDERING SYSTEM PROVIDED HISTORY: cough, wheezing, asthma TECHNOLOGIST PROVIDED HISTORY: Reason for exam:->cough, wheezing, asthma Reason for Exam: SOB after having Covid in March, states it feels like she's suffocating if she lies flat. Feels like her hands and feet are numb Acuity: Acute Type of Exam: Initial FINDINGS: Left greater than right pulmonary ground-glass and consolidative opacities. No pneumothorax or pleural effusion. Cardiac and mediastinal contours normal.  No acute osseous abnormality.      Bilateral pulmonary opacities concerning for multifocal viral pneumonia. CT CHEST PULMONARY EMBOLISM W CONTRAST    Result Date: 5/14/2021  EXAMINATION: CTA OF THE CHEST 5/14/2021 9:31 pm TECHNIQUE: CTA of the chest was performed after the administration of intravenous contrast.  Multiplanar reformatted images are provided for review. MIP images are provided for review. Dose modulation, iterative reconstruction, and/or weight based adjustment of the mA/kV was utilized to reduce the radiation dose to as low as reasonably achievable. COMPARISON: None. HISTORY: ORDERING SYSTEM PROVIDED HISTORY: tachy, sob, hx lupus TECHNOLOGIST PROVIDED HISTORY: Reason for exam:->tachy, sob, hx lupus Decision Support Exception - unselect if not a suspected or confirmed emergency medical condition->Emergency Medical Condition (MA) Reason for Exam: Tachy, SOB, lupus. Posterior head ache (pressure). Shortness of Breath (pt states hasn't been feeling well and suddenly became sob today. hx of lupus and states \"my lungs are not good\". pt having a hard time talking in between breaths during triage. ). Hx of breast cancer. Acuity: Acute Type of Exam: Initial FINDINGS: Pulmonary Arteries: The pulmonary arteries are well opacified and prominent centrally with no filling defect or vessel cut off peripherally. Mediastinum: No evidence of mediastinal lymphadenopathy. The heart and pericardium demonstrate no acute abnormality. The aorta is well opacified and mildly dilated throughout with no filling defect or aneurysm. Lungs/pleura: There are tiny pleural effusions along the lung bases posteriorly. The lungs are hyperinflated. There are hazy ground-glass and reticulonodular opacities along both upper lobes posterolaterally extending inferiorly which is more prominent on the left with some hazy ground-glass opacities along the lung bases posterolaterally extending into the superior segments which is more prominent on the left. No masses are seen.  Upper Abdomen: Limited images of the upper abdomen are unremarkable. Soft Tissues/Bones: No acute bone or soft tissue abnormality. Prominent central pulmonary vessels which could be due to pulmonary artery hypertension with no pulmonary emboli identified. Mildly dilated thoracic aorta with no aneurysm or dissection. Hazy reticulonodular and ground-glass opacities scattered throughout both lungs which probably represents early multisegmental bronchopneumonia. This pattern of pneumonia has been described with COVID-19 disease. Recommend correlating with lab values and respiratory precautions Tiny bibasilar pleural effusions. CTA HEAD NECK W CONTRAST    Result Date: 5/14/2021  EXAMINATION: CTA OF THE HEAD AND NECK WITH CONTRAST; CT OF THE HEAD WITHOUT CONTRAST 5/14/2021 9:29 pm; 5/14/2021 9:24 pm: TECHNIQUE: CTA of the head and neck was performed with the administration of intravenous contrast. Multiplanar reformatted images are provided for review. MIP images are provided for review. Stenosis of the internal carotid arteries measured using NASCET criteria. Dose modulation, iterative reconstruction, and/or weight based adjustment of the mA/kV was utilized to reduce the radiation dose to as low as reasonably achievable.; CT of the head was performed without the administration of intravenous contrast. Dose modulation, iterative reconstruction, and/or weight based adjustment of the mA/kV was utilized to reduce the radiation dose to as low as reasonably achievable. Noncontrast CT of the head with reconstructed 2-D images are also provided for review. COMPARISON: None. HISTORY: ORDERING SYSTEM PROVIDED HISTORY: atypical headache, patient has lupus, family history of brain aneurysms. TECHNOLOGIST PROVIDED HISTORY: Reason for exam:->atypical headache, patient has lupus, family history of brain aneurysms.  Decision Support Exception - unselect if not a suspected or confirmed emergency medical condition->Emergency Medical Condition (MA) Reason for Exam: Atypical headache, patient has lupus, family history of brain aneurysms. Acuity: Acute Type of Exam: Initial; ORDERING SYSTEM PROVIDED HISTORY: posterior head ache (pressure) TECHNOLOGIST PROVIDED HISTORY: Reason for exam:->posterior head ache (pressure) Has a \"code stroke\" or \"stroke alert\" been called? ->No Decision Support Exception - unselect if not a suspected or confirmed emergency medical condition->Emergency Medical Condition (MA) Is the patient pregnant?->No Reason for Exam: Posterior head ache (pressure). Shortness of Breath (pt states hasn't been feeling well and suddenly became sob today. hx of lupus and states \"my lungs are not good\". pt having a hard time talking in between breaths during triage. ). Acuity: Acute Type of Exam: Initial FINDINGS: CT HEAD: BRAIN/VENTRICLES:  No acute intracranial hemorrhage or extraaxial fluid collection. Grey-white differentiation is maintained. No evidence of mass, mass effect or midline shift. No evidence of hydrocephalus. ORBITS: The visualized portion of the orbits demonstrate no acute abnormality. SINUSES:  The visualized paranasal sinuses and mastoid air cells demonstrate no acute abnormality. SOFT TISSUES/SKULL: No acute abnormality of the visualized skull or soft tissues. 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 CTA NECK: AORTIC ARCH/ARCH VESSELS: No dissection or arterial injury. No significant stenosis of the brachiocephalic or subclavian arteries. CAROTID ARTERIES: No dissection, arterial injury, or hemodynamically significant stenosis by NASCET criteria. VERTEBRAL ARTERIES: No dissection, arterial injury, or significant stenosis. SOFT TISSUES: Scattered small ground-glass densities are identified within both upper lobes which may represent a mild infectious/inflammatory process. Mild right hilar adenopathy is identified likely reactive. The larynx and pharynx are unremarkable. No acute abnormality of the salivary and thyroid glands. BONES: No acute osseous abnormality. CTA HEAD: ANTERIOR CIRCULATION: No significant stenosis of the intracranial internal carotid, anterior cerebral, or middle cerebral arteries. No aneurysm. POSTERIOR CIRCULATION: No significant stenosis of the vertebral, basilar, or posterior cerebral arteries. No aneurysm. OTHER: No dural venous sinus thrombosis on this non-dedicated study     1. No acute intracranial abnormality. Unremarkable noncontrast CT of the head 2. No intracranial aneurysm or large vessel occlusion 3. No significant carotid stenosis or vertebral artery abnormality. 4. Small ground-glass densities within both upper lobes which may represent a mild infectious/inflammatory process. 5. Mild right hilar adenopathy is partially visualized and is likely reactive         The patient was seen and examined on day of discharge and this discharge summary is in conjunction with any daily progress note from day of discharge. Time Spent on discharge is 45 minutes  in the examination, evaluation, counseling and review of medications and discharge plan. Note that more than 30 minutes was spent in preparing discharge papers, discussing discharge with patient, medication review, etc.       Signed:    Kelly Pagan MD   5/26/2021      Thank you Sunita Lopez MD for the opportunity to be involved in this patient's care.  If you have any questions or concerns please feel free to contact me at [address] [place] [address]

## 2021-05-26 NOTE — PROGRESS NOTES
[address]  HEART FAILURE  Progress Note      Admit Date 5/23/2021     Reason for Consult:      Reason for Consultation/Chief Complaint: SOB    HPI:    Yvonne Farris is a 39 y.o. female with PMH anemia, asthma, SLE, COVID in March admitted with chest tightness cough, wheezing and SOB. She was admitted 5/14-5/16 with similar symptoms. During that admission, she was diagnosed with hypertensive urgency and asthma exacerbation. She had a CT of the chest in the ED that revealed interlobular septal thickening, pleural effusions, groundglass airspace disease concerning for possible pulmonary edema. Echo with new low EF and pt had LHC yesterday with nl CA. She is to d/c with lifevest      Subjective:  Patient is being seen for CHF, new cardiomyopathy. There were no acute overnight cardiac events. Today Ms. Boone c/o chest sorenss but denies shortness of breath or palpitations, feeling better and ready for d/c today.      Baseline Wt 172  Wt Readings from Last 3 Encounters:   05/26/21 172 lb 3.2 oz (78.1 kg)   05/16/21 190 lb 12.8 oz (86.5 kg)   11/03/18 185 lb (83.9 kg)          NYHA Class II  Objective:   /71   Pulse 95   Temp 97.5 °F (36.4 °C) (Oral)   Resp 16   Ht 5' 2\" (1.575 m)   Wt 172 lb 3.2 oz (78.1 kg)   LMP 05/09/2021   SpO2 98%   BMI 31.50 kg/m²       Intake/Output Summary (Last 24 hours) at 5/26/2021 0895  Last data filed at 5/25/2021 2002  Gross per 24 hour   Intake 250 ml   Output    Net 250 ml      Wt Readings from Last 3 Encounters:   05/26/21 172 lb 3.2 oz (78.1 kg)   05/16/21 190 lb 12.8 oz (86.5 kg)   11/03/18 185 lb (83.9 kg)      In: 240 [P.O.:240]  Out: -       Physical Exam:  General Appearance:  Non-obese/Well Nourished  Respiratory:  · Resp Auscultation: Normal breath sounds without dullness  Cardiovascular:  · Auscultation: Regular rate and rhythm, normal S1S2, +murmur  · Palpation: Normal    · JVD: none  · Pedal Pulses: 2+ and equal   Abdomen:  · Soft, NT, ND, + bs  Extremities:  · No Cyanosis or Clubbing  · Extremities: negative  Neurological/Psychiatric:  · Oriented to time, place, and person  · Non-anxious    MEDICATIONS:   Scheduled Meds:   Scheduled Meds:   sodium chloride flush  5-40 mL Intravenous 2 times per day    furosemide  40 mg Oral Daily    buPROPion  300 mg Oral QAM    hydroxychloroquine  200 mg Oral BID    vitamin B-12  500 mcg Oral Daily    Vitamin D  1,000 Units Oral Daily    ascorbic acid  500 mg Oral BID    sodium chloride flush  10 mL Intravenous 2 times per day    enoxaparin  40 mg Subcutaneous Daily    albuterol sulfate HFA  2 puff Inhalation 4x daily    ipratropium  2 puff Inhalation 4x daily    predniSONE  10 mg Oral Daily    carvedilol  3.125 mg Oral BID WC    lisinopril  5 mg Oral Daily     Continuous Infusions:   sodium chloride      sodium chloride       PRN Meds:.sodium chloride flush, sodium chloride, acetaminophen, ondansetron, labetalol, albuterol sulfate HFA, sodium chloride flush, sodium chloride, polyethylene glycol, [DISCONTINUED] acetaminophen **OR** acetaminophen, albuterol, meclizine, potassium chloride **OR** potassium alternative oral replacement **OR** potassium chloride, magnesium sulfate, sodium phosphate IVPB **OR** sodium phosphate IVPB  Continuous Infusions:   sodium chloride      sodium chloride         Intake/Output Summary (Last 24 hours) at 5/26/2021 0910  Last data filed at 5/25/2021 2002  Gross per 24 hour   Intake 250 ml   Output    Net 250 ml       Lab Data:  CBC:   Lab Results   Component Value Date    WBC 8.6 05/26/2021    HGB 12.8 05/26/2021     05/26/2021     BMP:  Lab Results   Component Value Date     05/26/2021    K 3.8 05/26/2021    K 3.7 05/23/2021    CL 99 05/26/2021    CO2 24 05/26/2021    BUN 18 05/26/2021    CREATININE 0.9 05/26/2021    GLUCOSE 131 05/26/2021     INR: No results found for: INR     CARDIAC LABS  ENZYMES:  Recent Labs     05/23/21 2110   TROPONINI <0.01 FASTING LIPID PANEL:  Lab Results   Component Value Date    TSH 0.34 09/11/2012     LIVER PROFILE:  Lab Results   Component Value Date    AST 53 05/26/2021    AST 52 05/25/2021     05/26/2021     05/25/2021     BNP:   Lab Results   Component Value Date    PROBNP 1,528 05/25/2021    PROBNP 4,158 05/23/2021    PROBNP 1,206 05/14/2021     Iron Studies:  No results found for: FERRITIN  No results found for: IRON, TIBC, FERRITIN   Iron Deficiency Anemia:  No    IV Iron Therapy:  No  2017 ACC/AHA HF Guidelines:   intravenous iron replacement in patients with New York Heart Association (NYHA) class II and III HF and iron deficiency(ferritin <100 ng/ml or 100-300 ng/ml if transferrin saturation <20%), to improve functional status and QoL. 1. WEIGHT: Admit Weight: 175 lb (79.4 kg)      Today  Weight: 172 lb 3.2 oz (78.1 kg)   2. I/O     Intake/Output Summary (Last 24 hours) at 5/26/2021 0910  Last data filed at 5/25/2021 2002  Gross per 24 hour   Intake 250 ml   Output    Net 250 ml       Cardiac Testing:   Echo:  5/24/21:   -The left ventricle is dilated.   -Ejection fraction is visually estimated to be 25%.  -There is mild hypertrophy of the posterior wall.   -There is diffuse global hypokinesis.   -The inferolateral wall appears hypokinetic.   -Grade I diastolic dysfunction with normal LV filling pressures. E/e' =   15.4.   -The left atrium is dilated.   -Trivial aortic regurgitation.   -Moderate to severe mitral regurgitation.   -There is at least moderate tricuspid regurgitation. RVSP = 36 mmHG.   -Mild pulmonic regurgitation present  [facility] 5/25/21:   Anatomy:   LM-normal  LAD-normal  Cx-normal  OM1- normal  RCA-normal  Left dominant  LVEF-25%, 3+ MR        Hemodynamics:  RA- 4/3 mean 0  RV-28/-1, 4  PAWP-9/4 mean 5  PA- 27/10 mean 17     C. O. 6.1   Impression:  1.  Normal coronary arteries. 2.  Severe LV systolic dysfunction. 3.  Moderate to severe mitral regurgitation.   4.  Normal right-sided

## 2021-05-26 NOTE — PROGRESS NOTES
Shift assessment completed. Pt is a/o X 4. VSS. POC discussed and all questions answered. Pt provided with fresh ice water. Pt has belongings and call light in reach. Bed is locked and in lowest position, bed alarm is refused by pt and pt agrees to call for assistance if needed. Pt denies further needs, will continue to monitor.

## 2021-05-26 NOTE — PROGRESS NOTES
Nutrition Education    · Verbally reviewed information with Patient  · Educated on CHF  · Written educational materials provided. Refer to Patient Education activity for more details. Provided heart failure diet education. Education included low sodium diet guidelines (3-4 gm Na+/day) and fluid restriction (64 oz/day). Reviewed foods to choose and foods to avoid, along with label reading and ways to add flavor to food. Pt does not currently follow a low sodium diet at home. Pt states understanding of education. Time spent with patient: 15 minutes.            Electronically signed by Judi Reese RD, LD on 5/26/21 at 10:33 AM EDT    Contact: [phone]

## 2021-05-26 NOTE — PROGRESS NOTES
[facility] Interventional Cardiology Daily Progress Note      Admit Date:  5/23/2021    Chief Complaint: Chest pain, cardiomyopathy    Subjective:  Ms. Kyle Boyd notes a little chest discomfort with deep breaths otherwise no complaints of chest discomfort or shortness of breath. Objective:   /81   Pulse 103   Temp 98.1 °F (36.7 °C) (Oral)   Resp 16   Ht 5' 2\" (1.575 m)   Wt 172 lb 3.2 oz (78.1 kg)   LMP 05/09/2021   SpO2 98%   BMI 31.50 kg/m²       Intake/Output Summary (Last 24 hours) at 5/26/2021 1220  Last data filed at 5/25/2021 2002  Gross per 24 hour   Intake 240 ml   Output    Net 240 ml       TELEMETRY: Sinus     Physical Exam:  General:  Awake, alert, oriented x 3, NAD  Skin:  Warm and dry  Neck:  JVD 4 cm  Chest:  normal air entry  Cardiovascular:  RRR S1S2, no S3, 1/6 systolic at apex  Abdomen:  Soft, ND, NT, No HSM  Extremities:  No edema.   Cath site unremarkable    Medications:    sodium chloride flush  5-40 mL Intravenous 2 times per day    furosemide  40 mg Oral Daily    buPROPion  300 mg Oral QAM    hydroxychloroquine  200 mg Oral BID    vitamin B-12  500 mcg Oral Daily    Vitamin D  1,000 Units Oral Daily    ascorbic acid  500 mg Oral BID    sodium chloride flush  10 mL Intravenous 2 times per day    enoxaparin  40 mg Subcutaneous Daily    albuterol sulfate HFA  2 puff Inhalation 4x daily    ipratropium  2 puff Inhalation 4x daily    predniSONE  10 mg Oral Daily    carvedilol  3.125 mg Oral BID WC    lisinopril  5 mg Oral Daily      sodium chloride      sodium chloride       sodium chloride flush, sodium chloride, acetaminophen, ondansetron, labetalol, albuterol sulfate HFA, sodium chloride flush, sodium chloride, polyethylene glycol, [DISCONTINUED] acetaminophen **OR** acetaminophen, albuterol, meclizine, potassium chloride **OR** potassium alternative oral replacement **OR** potassium chloride, magnesium sulfate, sodium phosphate IVPB **OR** sodium phosphate IVPB    Lab Data:  CBC:   Recent Labs     05/23/21 2110 05/25/21 0432 05/26/21  0602   WBC 6.6 7.8 8.6   HGB 12.0 11.9* 12.8   HCT 36.4 36.0 39.2   MCV 98.1 97.5 99.1    285 318     BMP:   Recent Labs     05/23/21 2110 05/25/21 0432 05/26/21  0602   * 136 137   K 3.7 3.7 3.8    101 99   CO2 23 26 24   PHOS  --  3.3 4.1   BUN 15 15 18   CREATININE 0.7 0.8 0.9     LIVER PROFILE:   Recent Labs     05/23/21 2110 05/25/21 0432 05/26/21  0602   AST 88* 52* 53*   * 108* 130*   BILITOT 0.7 0.5 0.5   ALKPHOS 101 83 89     PT/INR: No results for input(s): PROTIME, INR in the last 72 hours. APTT: No results for input(s): APTT in the last 72 hours. BNP:  No results for input(s): BNP in the last 72 hours. Imaging/Procedures:   Cardiac Cath 5/25/2021:  Access: Right CFA, Right CFV  Hemostasis: Angio-Seal closure device, manual pressure  Conscious sedation start time: 1636  Conscious sedation stop time: 1714  Versed: 4 mg  Fentanyl: 150 mcg  Bleeding risk: Low  LVEDP: 6 mmHg  AO: 103/85 mmHg  Estimated blood loss: Less than 20 mL  Contrast: 66 mL  Fluoroscopy time: 1.5 min.        Anatomy:   LM-normal  LAD-normal  Cx-normal  OM1- normal  RCA-normal  Left dominant  LVEF-25%, 3+ MR        Hemodynamics:  RA- 4/3 mean 0  RV-28/-1, 4  PAWP-9/4 mean 5  PA- 27/10 mean 17     C. O. 6.1   Impression:  1. Normal coronary arteries. 2.  Severe LV systolic dysfunction. 3.  Moderate to severe mitral regurgitation. 4.  Normal right-sided pressures.     Plan:  1. Medical management. 2.  Consult EP regarding potential evaluation for LifeVest.    Echo 5/24/2021:   Summary   -The left ventricle is dilated.   -Ejection fraction is visually estimated to be 25%.  -There is mild hypertrophy of the posterior wall.   -There is diffuse global hypokinesis.   -The inferolateral wall appears hypokinetic.   -Grade I diastolic dysfunction with normal LV filling pressures.  E/e' =   15.4.   -The left atrium is dilated.   -Trivial aortic regurgitation.   -Moderate to severe mitral regurgitation.   -There is at least moderate tricuspid regurgitation. RVSP = 36 mmHG.   -Mild pulmonic regurgitation present. Assessment/Plan:  Principal Problem:    Acute pulmonary edema (HCC)  Plan: Multifactorial-nonischemic cardiomyopathy, uncontrolled hypertension, etc.  Resolved. Active Problems:    Acute systolic (congestive) heart failure (HCC)  Plan: Compensated. CHF service following. [facility]  Plan: Possibly viral.  Coronary arteries normal by cardiac cath 5/25/2021. EP consulted regarding LifeVest candidacy and LifeVest is recommended. Precordial pain  Plan: Atypical.      Nonrheumatic mitral valve regurgitation  Plan: Moderate to severe. Will need reassessment with echo in 3 months or so. Prolonged QT interval  Plan: Avoid QT prolonging drugs. SLE (systemic lupus erythematosus) ([facility]  Plan: Per primary service. Asthma  Plan: Chronic. Obesity (BMI 30-39. 9)  Plan: Weight loss recommended. Pneumonia due to COVID-19 virus  Plan: Per primary service. Discussed with EP service, patient may benefit from cardiac MRI to rule out infiltrative myocardial disease or possibly myocarditis undiagnosed. Will discuss with CHF service.         Ivonne Myers MD, MD 5/26/2021 12:20 PM

## 2021-05-26 NOTE — PROGRESS NOTES
Data- discharge order received, pt verbalized agreement to discharge, disposition to previous residence, no needs for HHC/DME. Action- discharge instructions prepared and given to pt, pt verbalized understanding. Medication information packet given r/t NEW and/or CHANGED prescriptions emphasizing name/purpose/side effects, pt verbalized understanding. Discharge instruction summary: Diet- cardiac, Activity- as elizabeth, Primary Care Physician as follows: Haley Reed -[phone] f/u appointment - pt has f/u with cards June 1, immunizations reviewed , prescription medications filled @ outpt pharm and delivered to pts room. Inpatient surgical procedure precautions reviewed: yes CHF Education reviewed. Pt/ Family has had a total of 60 minutes CHF education this admission encounter. Response- Pt belongings gathered, IV removed. Disposition is home (no HHC/DME needs), transported with , taken to lobby via w/c w/ discharge lounge personel, no complications.

## 2021-05-26 NOTE — CONSULTS
[facility]   Electrophysiology Consultation   Date: 2021  Reason for Consultation: cardiomyopathy , long QT  Consult Requesting Physician: Alf Cano MD     Chief Complaint   Patient presents with    Shortness of Breath     Pt reports increasing SOB after having Covid in March, states it feels like she's suffocating if she lies flat. Feels like her hands and feet are numb. HPI: Elaine Diaz is a 39 y.o. female with history of lupus, asthma, obesity was admitted recently in March for COVID-19, she was admitted to hospital with shortness of breath and acute pulmonary edema. She was treated for acute heart failure. And left heart catheterization was done which showed no significant coronary disease. She was also noted to have significant prolonged QTc    Past Medical History:   Diagnosis Date    Anemia     Asthma     no attack since teenager no meds    History of 2019 novel coronavirus disease (COVID-19)     Systemic lupus [facility]matosus ([facility]         Past Surgical History:   Procedure Laterality Date     SECTION, LOW TRANSVERSE N/A     EYE SURGERY      TUBAL LIGATION      reversal     TUBAL LIGATION      TL reversed in        Allergies   Allergen Reactions    Shellfish-Derived Products Hives    Penicillins Hives       Social History:  Reviewed. reports that she has never smoked. She has never used smokeless tobacco. She reports that she does not drink alcohol and does not use drugs. Family History:  Reviewed. family history includes Cancer in her father; Early Death in her mother; High Blood Pressure in her mother; Substance Abuse in her father and mother. Review of System:  All other systems reviewed and are negative except for that noted above.  Pertinent negatives are:     · General: negative for fever, chills   · Ophthalmic ROS: negative for - eye pain or loss of vision  · ENT ROS: negative for - headaches, sore throat   · Respiratory: negative for - cough, sputum  · Cardiovascular: Reviewed in HPI  · Gastrointestinal: negative for - abdominal pain, diarrhea, N/V  · Hematology: negative for - bleeding, blood clots, bruising or jaundice  · Genito-Urinary:  negative for - Dysuria or incontinence  · Musculoskeletal: negative for - Joint swelling, muscle pain  · Neurological: negative for - confusion, dizziness, headaches   · Psychiatric: No anxiety, no depression. · Dermatological: negative for - rash    Physical Examination:  Vitals:    21 0834   BP:    Pulse:    Resp: 16   Temp:    SpO2: 98%      In: 240 [P.O.:240]  Out: -    Wt Readings from Last 3 Encounters:   21 172 lb 3.2 oz (78.1 kg)   21 190 lb 12.8 oz (86.5 kg)   18 185 lb (83.9 kg)     Temp  Av.9 °F (36.6 °C)  Min: 96.8 °F (36 °C)  Max: 98.4 °F (36.9 °C)  Pulse  Av.1  Min: 89  Max: 105  BP  Min: 98/65  Max: 136/86  SpO2  Av.3 %  Min: 96 %  Max: 100 %    Intake/Output Summary (Last 24 hours) at 2021 0858  Last data filed at 2021  Gross per 24 hour   Intake 250 ml   Output    Net 250 ml       · Telemetry: Sinus rhythm   · Constitutional: Oriented. No distress. · Head: Normocephalic and atraumatic. · Mouth/Throat: Oropharynx is clear and moist.   · Eyes: Conjunctivae normal. EOM are normal.   · Neck: Neck supple. No rigidity. No JVD present. · Cardiovascular: Normal rate, regular rhythm, S1&S2. · Pulmonary/Chest: Bilateral respiratory sounds. No wheezes, No rhonchi. · Abdominal: Soft. Bowel sounds present. No distension, No tenderness. · Musculoskeletal: No tenderness. No edema    · Lymphadenopathy: Has no cervical adenopathy. · Neurological: Alert and oriented. Cranial nerve appears intact, No Gross deficit   · Skin: Skin is warm and dry. No rash noted. · Psychiatric: Has a normal behavior     Labs, diagnostic and imaging results reviewed. Reviewed.    Recent Labs     21  2110 21  0432 21  0602   * 136 137   K 3.7 3.7 3.8    101 99   CO2 23 26 24   PHOS  --  3.3 4.1   BUN 15 15 18   CREATININE 0.7 0.8 0.9     Recent Labs     05/23/21  2110 05/25/21  0432 05/26/21  0602   WBC 6.6 7.8 8.6   HGB 12.0 11.9* 12.8   HCT 36.4 36.0 39.2   MCV 98.1 97.5 99.1    285 318     Lab Results   Component Value Date    TROPONINI <0.01 05/23/2021     No results found for: BNP  No results found for: PROTIME, INR  No results found for: CHOL, HDL, TRIG    ECG: Normal sinus rhythmVoltage criteria for left ventricular hypertrophyNonspecific T wave abnormalityProlonged QTAbnormal 535  Echo: Conclusions      Summary   -The left ventricle is dilated. -Ejection fraction is visually estimated to be 25%. -There is mild hypertrophy of the posterior wall.   -There is diffuse global hypokinesis. -The inferolateral wall appears hypokinetic.   -Grade I diastolic dysfunction with normal LV filling pressures. E/e' =   15.4.   -The left atrium is dilated. -Trivial aortic regurgitation.   -Moderate to severe mitral regurgitation.   -There is at least moderate tricuspid regurgitation. RVSP = 36 mmHG. -Mild pulmonic regurgitation present. Cath: LM-normal  LAD-normal  Cx-normal  OM1- normal  RCA-normal  Left dominant  LVEF-25%, 3+ MR        Hemodynamics:  RA- 4/3 mean 0  RV-28/-1, 4  PAWP-9/4 mean 5  PA- 27/10 mean 17     C. O. 6.1        Impression:  1. Normal coronary arteries. 2.  Severe LV systolic dysfunction. 3.  Moderate to severe mitral regurgitation.   4.  Normal right-sided pressures.       Scheduled Meds:   sodium chloride flush  5-40 mL Intravenous 2 times per day    furosemide  40 mg Oral Daily    buPROPion  300 mg Oral QAM    hydroxychloroquine  200 mg Oral BID    vitamin B-12  500 mcg Oral Daily    Vitamin D  1,000 Units Oral Daily    ascorbic acid  500 mg Oral BID    sodium chloride flush  10 mL Intravenous 2 times per day    enoxaparin  40 mg Subcutaneous Daily    albuterol sulfate HFA  2 puff Inhalation 4x daily    ipratropium  2 puff Inhalation 4x daily    predniSONE  10 mg Oral Daily    carvedilol  3.125 mg Oral BID WC    lisinopril  5 mg Oral Daily     Continuous Infusions:   sodium chloride      sodium chloride       PRN Meds:.sodium chloride flush, sodium chloride, acetaminophen, ondansetron, labetalol, albuterol sulfate HFA, sodium chloride flush, sodium chloride, polyethylene glycol, [DISCONTINUED] acetaminophen **OR** acetaminophen, albuterol, meclizine, potassium chloride **OR** potassium alternative oral replacement **OR** potassium chloride, magnesium sulfate, sodium phosphate IVPB **OR** sodium phosphate IVPB     Patient Active Problem List    Diagnosis Date Noted    Dilated cardiomyopathy [facility] 2021    Precordial pain 2021    Acute pulmonary edema ([facility] 2021    Prolonged QT interval 2021    SLE (systemic lupus erythematosus) ([facility] 2021    Asthma 2021    Obesity (BMI 30-39.9) 2021    [facility]    Pneumonia due to COVID-19 virus 2021    Pneumonia due to organism     Acute systolic (congestive) heart failure (HCC)     SOB (shortness of breath)     Acute respiratory distress 05/15/2021     delivery delivered 2014    Vaginal bleeding in pregnancy 2014    Supervision of normal pregnancy 2013      Active Hospital Problems    Diagnosis Date Noted    Dilated cardiomyopathy ([facility] [I42.0] 2021     Priority: High    Precordial pain [R07.2] 2021     Priority: High    Acute pulmonary edema (HCC) [J81.0] 2021    Prolonged QT interval [R94.31] 2021    SLE (systemic lupus erythematosus) ([facility] [M32.9] 2021    Asthma [J45.909] 2021    Obesity (BMI 30-39. 9) [E66.9] 2021    Acute respiratory failure with hypoxia ([facility] [J96.01] 2021    Pneumonia due to COVID-19 virus [U07.1, J12.82] 2021    Pneumonia due to organism [J18.9]     Acute systolic (congestive) heart failure (HCC) [I50.21]     SOB (shortness of breath) [R06.02]        Assessment:       Plan:    -Acute heart failure, dilated cardiomyopathy and prolonged QT      New diagnosis. The etiology of cardiomyopathy is not clear and it could include possibility of Covid cardiomyopathy, prior undiagnosed cardiomyopathy exacerbated over time or other causes. We will continue with medical therapy and reassess in 3 months. However meanwhile due to her high risk of sudden cardiac death due to both cardiomyopathy and prolonged QTC we need to send her home with a LifeVest.  May also consider MRI for assessment for any infiltrative or inflammatory disease. The prolonged QT is also not explained and I have no access to any prior EKGs that can be used for comparison. Given the very low EF and very prolonged QTc , she is at high risk of SCD and therefore needs a Lifevest to protect her from sudden death. -Mitral regurgitation     Moderate to severe on echo and 3+ on cath. For now medical therapy and depending on improvement in LV function and dimensions the MR may get better.      -Lupus  She is on steroid and stable. -Obesity    Excessive weight is complicating assessment and treatment. It is placing patient at risk for multiple co-morbidities as well as early death and contributing to the patient's presentation. - discussed weight management with diet and exercise      Thank you for allowing me to participate in the care of Elesa Cushing     NOTE: This report was transcribed using voice recognition software. Every effort was made to ensure accuracy, however, inadvertent computerized transcription errors may be present.

## 2021-05-27 ENCOUNTER — TELEPHONE (OUTPATIENT)
Dept: OTHER | Age: 46
End: 2021-05-27

## 2021-05-27 NOTE — PROGRESS NOTES
[address]   Congestive Heart Failure    Primary Care Doctor:  Concepcion Osman MD  Primary Cardiologist: Roberto Palencia       Chief Complaint:  CHF    History of Present Illness:  Oziel Connelly is a 39 y.o. female with  PMH anemia, asthma, anxiety, SLE, COVID in March who presents today for hospital f/u. Oziel Connelly was admitted 5/23/21 and discharged 5/26/21. Hospital course: Patient admitted as inpatient for acute pulmonary edema with acute respiratory failure with hypoxia and acute asthma exacerbsation.  Followed by Cardio and Pulm.  Diuresed with IV Lasix.  Started on IV steroids and then stopped by Armando Hill The left ventricle is dilated. Will be discharged with LifeVest given new CMP and prolonged QTc. Will be on Coreg, Lisinopril and Lasix. Cardio to address Aldactone in follow up. May need Cardiac MRI, this will be addressed in f/u with Cardio. Will be discharged on Prednisone 10 mg daily. This should be monitored by Pulm and Rheum. Since hospitalization she reports dizziness and denies chest pain, dyspnea, palpitations, orthopnea, PND, exertional chest pressure/discomfort, fatigue, early saiety, edema, syncope. BPs  systolic, pulse . She is coping with the lifevest, also has 2 children with health issudes and is getting  in August.     hospital weight on d/c was 172lb and discharge home weight was 169lb. Daily wts since that time have remained stable. Today's home wt: 168    F/U Phone Call date: nd    Baseline Weight: 169  Wt Readings from Last 3 Encounters:   05/26/21 172 lb 3.2 oz (78.1 kg)   05/16/21 190 lb 12.8 oz (86.5 kg)   11/03/18 185 lb (83.9 kg)      Admit BNP: 4158   Discharge BNP: 1528      EF: 25%  Cardiac Imaging:  Echo:  5/24/21:   -The left ventricle is dilated.   -Ejection fraction is visually estimated to be 25%.    -There is mild hypertrophy of the posterior wall.   -There is diffuse global hypokinesis.   -The inferolateral wall appears hypokinetic.   -Grade I diastolic dysfunction with normal LV filling pressures. E/e' =   15.4.   -The left atrium is dilated.   -Trivial aortic regurgitation.   -Moderate to severe mitral regurgitation.   -There is at least moderate tricuspid regurgitation. RVSP = 36 mmHG.   -Mild pulmonic regurgitation present  [facility]:   Anatomy:   LM-normal  LAD-normal  Cx-normal  OM1- normal  RCA-normal  Left dominant  LVEF-25%, 3+ MR        Hemodynamics:  RA- 4/3 mean 0  RV-28/-1, 4  PAWP-9/4 mean 5  PA- 27/10 mean 17     C. O. 6.1   Impression:  1.  Normal coronary arteries. 2.  Severe LV systolic dysfunction. 3.  Moderate to severe mitral regurgitation. 4.  Normal right-sided pressures.     Device: Lifevest         Activity: at baseline  Can you walk 1-2 blocks or do a moderate amount of house/yard work? Yes  Cardiac Rehab Referral: Yes, 21     NYHA Class: I       Sodium Restrictions: 3g  Fluid Restrictions: 48-64 oz/day  Sodium and fluid restriction compliance: good    Pt Education: The patient has received education on the following topics: dietary sodium restriction, heart failure medications, the importance of physical activity, symptom management and weight monitoring           Past Medical History:   has a past medical history of Anemia, Asthma, History of 2019 novel coronavirus disease (COVID-19), and Systemic lupus erythematosus ([facility] [address] Surgical History:   has a past surgical history that includes Tubal ligation; Tubal ligation; eye surgery; and  section, low transverse (N/A, ). Social History:   reports that she has never smoked. She has never used smokeless tobacco. She reports that she does not drink alcohol and does not use drugs.    Family History:   Family History   Problem Relation Age of Onset    Early Death Mother     High Blood Pressure Mother     Substance Abuse Mother     Cancer Father     Substance Abuse Father        Home Medications:  Prior to Admission medications Medication Sig Start Date End Date Taking? Authorizing Provider   lisinopril (PRINIVIL;ZESTRIL) 5 MG tablet Take 1 tablet by mouth daily 5/26/21   Kobe Lawson MD   carvedilol (COREG) 3.125 MG tablet Take 1 tablet by mouth 2 times daily (with meals) 5/26/21   Kobe Lawson MD   furosemide (LASIX) 40 MG tablet Take 1 tablet by mouth daily 5/26/21   Kobe Lawson MD   predniSONE (DELTASONE) 10 MG tablet Take 1 tablet by mouth daily for 10 days 5/26/21 6/5/21  Kobe Lawson MD   albuterol sulfate  (90 Base) MCG/ACT inhaler Inhale 2 puffs into the lungs every 6 hours as needed for Shortness of Breath 5/16/21   ALEXIS Henley CNP   hydroxychloroquine (PLAQUENIL) 200 MG tablet Take 200 mg by mouth 2 times daily    Historical Provider, MD   buPROPion (WELLBUTRIN XL) 300 MG extended release tablet Take 300 mg by mouth every morning    Historical Provider, MD   vitamin B-12 (CYANOCOBALAMIN) 500 MCG tablet Take 500 mcg by mouth daily    Historical Provider, MD   vitamin D (CHOLECALCIFEROL) 25 MCG (1000 UT) TABS tablet Take 1,000 Units by mouth daily    Historical Provider, MD   meloxicam (MOBIC) 15 MG tablet Take 15 mg by mouth daily    Historical Provider, MD   dicyclomine (BENTYL) 10 MG capsule Take 1 capsule by mouth every 6 hours as needed (cramps) 11/3/18   ALEXIS Moreno CNP   ondansetron (ZOFRAN ODT) 4 MG disintegrating tablet Take 1-2 tablets by mouth every 12 hours as needed for Nausea May Sub regular tablet (non-ODT) if insurance does not cover ODT. 11/3/18   ALEXIS Moreno CNP        Allergies:  Shellfish-derived products and Penicillins     ROS:   Review of Systems   Constitutional: Negative. Respiratory: Negative. Cardiovascular: Negative. Gastrointestinal: Negative. Genitourinary: Negative. Musculoskeletal: Negative. Skin: Negative. Neurological: Positive for dizziness. Hematological: Negative. Psychiatric/Behavioral: Negative. Physical Examination:    Vitals:    06/01/21 0844   BP: (!) 96/48   Pulse: 100   SpO2: 99%   Weight: 171 lb (77.6 kg)   Height: 5' 2\" (1.575 m)           Physical Exam  Vitals reviewed. Constitutional:       Appearance: Normal appearance. She is normal weight. HENT:      Head: Normocephalic and atraumatic. Eyes:      Extraocular Movements: Extraocular movements intact. Pupils: Pupils are equal, round, and reactive to light. Cardiovascular:      Rate and Rhythm: Normal rate and regular rhythm. Pulses: Normal pulses. Heart sounds: Normal heart sounds. Pulmonary:      Effort: Pulmonary effort is normal.      Breath sounds: Normal breath sounds. Abdominal:      Palpations: Abdomen is soft. Musculoskeletal:         General: Normal range of motion. Cervical back: Normal range of motion and neck supple. Skin:     General: Skin is warm and dry. Neurological:      General: No focal deficit present. Mental Status: She is alert and oriented to person, place, and time. Psychiatric:         Mood and Affect: Mood normal.         Behavior: Behavior normal.         Thought Content:  Thought content normal.         Judgment: Judgment normal.         Lab Data:    CBC:   Lab Results   Component Value Date    WBC 8.6 05/26/2021    WBC 7.8 05/25/2021    WBC 6.6 05/23/2021    RBC 3.96 05/26/2021    RBC 3.69 05/25/2021    RBC 3.71 05/23/2021    HGB 12.8 05/26/2021    HGB 11.9 05/25/2021    HGB 12.0 05/23/2021    HCT 39.2 05/26/2021    HCT 36.0 05/25/2021    HCT 36.4 05/23/2021    MCV 99.1 05/26/2021    MCV 97.5 05/25/2021    MCV 98.1 05/23/2021    RDW 15.3 05/26/2021    RDW 14.9 05/25/2021    RDW 14.6 05/23/2021     05/26/2021     05/25/2021     05/23/2021     BMP:  Lab Results   Component Value Date     05/26/2021     05/25/2021     05/23/2021    K 3.8 05/26/2021    K 3.7 05/25/2021    K 3.7 05/23/2021    K 3.3 05/16/2021    K 2.6 05/14/2021    CL 99 05/26/2021     05/25/2021     05/23/2021    CO2 24 05/26/2021    CO2 26 05/25/2021    CO2 23 05/23/2021    PHOS 4.1 05/26/2021    PHOS 3.3 05/25/2021    PHOS 3.3 05/16/2021    BUN 18 05/26/2021    BUN 15 05/25/2021    BUN 15 05/23/2021    CREATININE 0.9 05/26/2021    CREATININE 0.8 05/25/2021    CREATININE 0.7 05/23/2021     BNP:   Lab Results   Component Value Date    PROBNP 1,528 05/25/2021    PROBNP 4,158 05/23/2021    PROBNP 1,206 05/14/2021     Iron Studies:  No results found for: FERRITIN  No results found for: IRON, TIBC, FERRITIN   Iron Deficiency Anemia:  No IV Iron Therapy:  No  2017 ACC/AHA HF Guidelines:   intravenous iron replacement in patients with New York Heart Association (NYHA) class II and III HF and iron deficiency(ferritin <100 ng/ml or 100-300 ng/ml if transferrin saturation <20%), to improve functional status and QoL. Assessment/Plan:    1. Dilated cardiomyopathy ([facility] [address] - ACE started at hosp d/c, check labs today, then start wil if k ok, increase coreg, lifevest, CR referral.  Will get HF f/u and quick look echo late July to try and take off LV before wedding in August   2. Acute systolic (congestive) heart failure (HCC) - compensated, continue lasix, labs today   3. SOB (shortness of breath) - resolved     Time   30-39 minutes spent preparing to see patient including reviewing patient history/prior tests/prior consults, performing a medical exam, counseling and educating patient/family/caregiver, ordering medications/tests/procedures, referring and communicating with PCPs and other pertinent consultants, documenting information in the EMR, independently interpreting results and communicating to family and coordination of patient care. Instructions:   1. Medications: increase carvedilol to 6.25mg twice a day  2. Labs: this week  3. Referrals: cardiac rehab  4.  Lifestyle Recommendations: Weigh yourself every day in the morning after urination, call [facility] if wt increases 2-3lb in one day or 5lb in one week, Limit sodium to 2000mg/day and fluids to 2L or 64oz/day. 5. Follow up: 2 weeks with Olga Guillory CHF Resource Line: [phone]    Heart Failure Websites:   www.heart. org  Www.cardiosmart. org    Websites with Low Sodium Recipes:   www.[employer] [facility].org/healthy-lifestyle/recipes/low-sodium-recipes  www.[employer]/recipes    Smartphone shante's that can help you keep track of symptoms, weights, and medications:  HF Storylines  HF Path  My HF  CareZone  Point of Care Heart Failure  WOWME  H2O Overload    Nutrition Shante to track calories, carbohydrates and sodium content of food:   CalorieKing  My[facility]. [facility] Hospital[facility]      I appreciate the opportunity of cooperating in the care of this individual.    ALEXIS Thomas - CNP, CNP, 5/27/2021, 2:59 PM

## 2021-05-28 ENCOUNTER — TELEPHONE (OUTPATIENT)
Dept: OTHER | Age: 46
End: 2021-05-28

## 2021-05-28 NOTE — TELEPHONE ENCOUNTER
[address]. 1975    Attempted to call patient for HF follow-up. No answer at this time.       Next MD/ Clinic appointment: 6/1 with Cr Espinal RN 5/28/2021 12:39 PM

## 2021-06-01 ENCOUNTER — OFFICE VISIT (OUTPATIENT)
Dept: CARDIOLOGY CLINIC | Age: 46
End: 2021-06-01
Payer: COMMERCIAL

## 2021-06-01 VITALS
BODY MASS INDEX: 31.47 KG/M2 | HEIGHT: 62 IN | HEART RATE: 100 BPM | SYSTOLIC BLOOD PRESSURE: 96 MMHG | WEIGHT: 171 LBS | DIASTOLIC BLOOD PRESSURE: 48 MMHG | OXYGEN SATURATION: 99 %

## 2021-06-01 DIAGNOSIS — R06.02 SOB (SHORTNESS OF BREATH): ICD-10-CM

## 2021-06-01 DIAGNOSIS — I42.0 DILATED CARDIOMYOPATHY (HCC): Primary | ICD-10-CM

## 2021-06-01 DIAGNOSIS — I50.21 ACUTE SYSTOLIC (CONGESTIVE) HEART FAILURE (HCC): ICD-10-CM

## 2021-06-01 PROCEDURE — 1111F DSCHRG MED/CURRENT MED MERGE: CPT | Performed by: NURSE PRACTITIONER

## 2021-06-01 PROCEDURE — 99214 OFFICE O/P EST MOD 30 MIN: CPT | Performed by: NURSE PRACTITIONER

## 2021-06-01 PROCEDURE — 1036F TOBACCO NON-USER: CPT | Performed by: NURSE PRACTITIONER

## 2021-06-01 PROCEDURE — G8427 DOCREV CUR MEDS BY ELIG CLIN: HCPCS | Performed by: NURSE PRACTITIONER

## 2021-06-01 PROCEDURE — G8417 CALC BMI ABV UP PARAM F/U: HCPCS | Performed by: NURSE PRACTITIONER

## 2021-06-01 RX ORDER — CARVEDILOL 6.25 MG/1
6.25 TABLET ORAL 2 TIMES DAILY WITH MEALS
Qty: 60 TABLET | Refills: 1 | Status: SHIPPED | OUTPATIENT
Start: 2021-06-01 | End: 2021-06-29

## 2021-06-01 RX ORDER — LORAZEPAM 0.5 MG/1
0.5 TABLET ORAL PRN
COMMUNITY

## 2021-06-01 ASSESSMENT — ENCOUNTER SYMPTOMS
RESPIRATORY NEGATIVE: 1
GASTROINTESTINAL NEGATIVE: 1

## 2021-06-01 NOTE — PATIENT INSTRUCTIONS
Instructions:   1. Medications: increase carvedilol to 6.25mg twice a day  2. Labs: this week  3. Referrals: cardiac rehab  4. Lifestyle Recommendations: Weigh yourself every day in the morning after urination, call Raul Madison if wt increases 2-3lb in one day or 5lb in one week, Limit sodium to 2000mg/day and fluids to 2L or 64oz/day.    5. Follow up: 2 weeks with Stefani Rubalcava CHF Resource Line: [phone]

## 2021-06-08 ENCOUNTER — HOSPITAL ENCOUNTER (OUTPATIENT)
Age: 46
Discharge: HOME | End: 2021-06-08
Payer: COMMERCIAL

## 2021-06-08 ENCOUNTER — OFFICE VISIT (OUTPATIENT)
Dept: PULMONOLOGY | Age: 46
End: 2021-06-08
Payer: COMMERCIAL

## 2021-06-08 ENCOUNTER — TELEPHONE (OUTPATIENT)
Dept: CARDIOLOGY CLINIC | Age: 46
End: 2021-06-08

## 2021-06-08 VITALS
OXYGEN SATURATION: 100 % | TEMPERATURE: 96.6 F | DIASTOLIC BLOOD PRESSURE: 72 MMHG | SYSTOLIC BLOOD PRESSURE: 132 MMHG | HEART RATE: 82 BPM

## 2021-06-08 DIAGNOSIS — I50.21 ACUTE SYSTOLIC (CONGESTIVE) HEART FAILURE (HCC): ICD-10-CM

## 2021-06-08 DIAGNOSIS — I42.0 DILATED CARDIOMYOPATHY (HCC): ICD-10-CM

## 2021-06-08 DIAGNOSIS — Z86.16 HISTORY OF 2019 NOVEL CORONAVIRUS DISEASE (COVID-19): Primary | ICD-10-CM

## 2021-06-08 DIAGNOSIS — M32.9 HX OF SYSTEMIC LUPUS ERYTHEMATOSUS (SLE) (HCC): ICD-10-CM

## 2021-06-08 DIAGNOSIS — J81.0 ACUTE PULMONARY EDEMA (HCC): ICD-10-CM

## 2021-06-08 LAB
ANION GAP SERPL CALCULATED.3IONS-SCNC: 11 MMOL/L (ref 3–16)
BUN BLDV-MCNC: 15 MG/DL (ref 7–20)
CALCIUM SERPL-MCNC: 9.1 MG/DL (ref 8.3–10.6)
CHLORIDE BLD-SCNC: 98 MMOL/L (ref 99–110)
CO2: 24 MMOL/L (ref 21–32)
CREAT SERPL-MCNC: 0.8 MG/DL (ref 0.6–1.1)
GFR AFRICAN AMERICAN: >60
GFR NON-AFRICAN AMERICAN: >60
GLUCOSE BLD-MCNC: 89 MG/DL (ref 70–99)
POTASSIUM SERPL-SCNC: 4.1 MMOL/L (ref 3.5–5.1)
PRO-BNP: 179 PG/ML (ref 0–124)
SODIUM BLD-SCNC: 133 MMOL/L (ref 136–145)

## 2021-06-08 PROCEDURE — 1036F TOBACCO NON-USER: CPT | Performed by: INTERNAL MEDICINE

## 2021-06-08 PROCEDURE — 36415 COLL VENOUS BLD VENIPUNCTURE: CPT

## 2021-06-08 PROCEDURE — 99214 OFFICE O/P EST MOD 30 MIN: CPT | Performed by: INTERNAL MEDICINE

## 2021-06-08 PROCEDURE — 1111F DSCHRG MED/CURRENT MED MERGE: CPT | Performed by: INTERNAL MEDICINE

## 2021-06-08 PROCEDURE — G8417 CALC BMI ABV UP PARAM F/U: HCPCS | Performed by: INTERNAL MEDICINE

## 2021-06-08 PROCEDURE — 83880 ASSAY OF NATRIURETIC PEPTIDE: CPT

## 2021-06-08 PROCEDURE — 80048 BASIC METABOLIC PNL TOTAL CA: CPT

## 2021-06-08 PROCEDURE — G8427 DOCREV CUR MEDS BY ELIG CLIN: HCPCS | Performed by: INTERNAL MEDICINE

## 2021-06-08 ASSESSMENT — ENCOUNTER SYMPTOMS
VOICE CHANGE: 0
WHEEZING: 0
BLOOD IN STOOL: 0
COUGH: 0
RHINORRHEA: 0
CHEST TIGHTNESS: 0
BACK PAIN: 0
STRIDOR: 0
SHORTNESS OF BREATH: 0
DIARRHEA: 0
ANAL BLEEDING: 0
SINUS PRESSURE: 0
SORE THROAT: 0
CHOKING: 0
ABDOMINAL PAIN: 0
APNEA: 0
CONSTIPATION: 0
ABDOMINAL DISTENTION: 0

## 2021-06-08 NOTE — TELEPHONE ENCOUNTER
Called pt about the message below and gave her the number. [phone] she verbalized understanding.

## 2021-06-08 NOTE — PROGRESS NOTES
Leland Ospina    YOB: 1975     Date of Service:  2021     Chief Complaint   Patient presents with    Shortness of Breath     HFU Breathing is good         HPI patient was recently hospitalized between  and  for pulmonary edema related to nonischemic severe cardiomyopathy. Patient underwent coronary angiogram on  which showed clean coronaries, EF 25%, 3+ MR. Patient states that her dyspnea has improved significantly since discharge-currently on Lasix 40 mg daily. Denies any pedal edema. No significant orthopnea or PND. Denies chest pain. Noticed some cough last week, which has now resolved. Allergies   Allergen Reactions    Shellfish-Derived Products Hives    Penicillins Hives     No outpatient medications have been marked as taking for the 21 encounter (Office Visit) with Jessie Dos Santos MD.       Immunization History   Administered Date(s) Administered    Rho (D) Immune Globulin 10/19/2012, 10/15/2014    Tdap (Boostrix, Adacel) 2013       Past Medical History:   Diagnosis Date    Anemia     Asthma     no attack since teenager no meds    History of 2019 novel coronavirus disease (COVID-19)     Systemic lupus erythematosus ([facility] [address]      Past Surgical History:   Procedure Laterality Date     SECTION, LOW TRANSVERSE N/A     EYE SURGERY      TUBAL LIGATION      reversal     TUBAL LIGATION      TL reversed in      Family History   Problem Relation Age of Onset    Early Death Mother     High Blood Pressure Mother     Substance Abuse Mother     Cancer Father     Substance Abuse Father        Review of Systems:  Review of Systems   Constitutional: Negative for activity change, appetite change, fatigue and fever. HENT: Negative for congestion, ear discharge, ear pain, postnasal drip, rhinorrhea, sinus pressure, sneezing, sore throat, tinnitus and voice change.     Respiratory: Negative for apnea, cough, choking, chest tightness, shortness of breath, wheezing and stridor. Cardiovascular: Negative for chest pain, palpitations and leg swelling. Gastrointestinal: Negative for abdominal distention, abdominal pain, anal bleeding, blood in stool, constipation and diarrhea. Musculoskeletal: Negative for arthralgias, back pain and gait problem. Skin: Negative for pallor and rash. Allergic/Immunologic: Negative for environmental allergies. Neurological: Negative for dizziness, tremors, seizures, syncope, speech difficulty, weakness, light-headedness, numbness and headaches. Hematological: Negative for adenopathy. Does not bruise/bleed easily. Psychiatric/Behavioral: Negative for sleep disturbance. Vitals:    06/08/21 0838   BP: 132/72   Pulse: 82   Temp: 96.6 °F (35.9 °C)   TempSrc: Temporal   SpO2: 100%     No flowsheet data found. There is no height or weight on file to calculate BMI. Wt Readings from Last 3 Encounters:   06/01/21 171 lb (77.6 kg)   05/26/21 172 lb 3.2 oz (78.1 kg)   05/16/21 190 lb 12.8 oz (86.5 kg)     BP Readings from Last 3 Encounters:   06/08/21 132/72   06/01/21 (!) 96/48   05/26/21 118/81         Physical Exam  Constitutional:       General: She is not in acute distress. Appearance: She is well-developed. She is not diaphoretic. HENT:      Mouth/Throat:      Pharynx: No oropharyngeal exudate. Cardiovascular:      Rate and Rhythm: Normal rate and regular rhythm. Heart sounds: Normal heart sounds. No murmur heard. Pulmonary:      Effort: No respiratory distress. Breath sounds: Normal breath sounds. No wheezing or rales. Chest:      Chest wall: No tenderness. Abdominal:      General: There is no distension. Palpations: There is no mass. Tenderness: There is no abdominal tenderness. There is no guarding or rebound. Musculoskeletal:         General: No swelling, tenderness or deformity. Skin:     Coloration: Skin is not pale. Findings: No erythema or rash. Neurological:      Mental Status: She is alert and oriented to person, place, and time. Cranial Nerves: No cranial nerve deficit. Motor: No abnormal muscle tone. Coordination: Coordination normal.      Deep Tendon Reflexes: Reflexes normal.             Health Maintenance   Topic Date Due    Pneumococcal 0-64 years Vaccine (1 of 2 - PPSV23) Never done    COVID-19 Vaccine (1) Never done    Lipid screen  Never done    Diabetes screen  08/18/2015    Flu vaccine (Season Ended) 09/01/2021    Potassium monitoring  05/26/2022    Creatinine monitoring  05/26/2022    DTaP/Tdap/Td vaccine (2 - Td or Tdap) 01/05/2023    Cervical cancer screen  04/19/2026    Hepatitis C screen  Completed    HIV screen  Completed    Hepatitis A vaccine  Aged Out    Hepatitis B vaccine  Aged Out    Hib vaccine  Aged Out    Meningococcal (ACWY) vaccine  Aged Out          Assessment/Plan:     Diagnosis Orders   1. History of 2019 novel coronavirus disease (COVID-19)  CT CHEST WO CONTRAST   2. Hx of systemic lupus erythematosus (SLE) (HCC)  CT CHEST WO CONTRAST      Nonischemic cardiomyopathy with significant MR, ? COVID-19 related-however this may not entirely explain mitral regurgitation. Patient has history of SLE, with positive double-stranded DNA and anti-Smith antibody-on Plaquenil, meloxicam and low-dose prednisone which was stopped following discharge. Patient might benefit MRI heart to rule out infiltrative cardiomyopathy. Patient has an appointment to see Dr. Rebecca John shortly. Patient currently has LifeVest in view of severe cardiomyopathy/QTC prolongation. I have advised the patient to contact her rheumatologist at [facility] for advice on continuation of prednisone for lupus. CT from 5/24 was suggestive of significant bilateral infiltrates and bibasal effusion, compatible with pulmonary edema.   However given her history of lupus, will repeat another CT imaging in couple of weeks in order to evaluate for

## 2021-06-09 ENCOUNTER — TELEPHONE (OUTPATIENT)
Dept: CARDIOLOGY CLINIC | Age: 46
End: 2021-06-09

## 2021-06-09 NOTE — TELEPHONE ENCOUNTER
Spoke to the pt-gave instructions per Karen's Sharlene, CNp. Pt is having the Mirena IUD implanted tomorrow, and was asked to check with our office, for an ok to proceed.

## 2021-06-09 NOTE — TELEPHONE ENCOUNTER
She can discuss further with NPKV at her appt  But in the mean time she can take and extra 20 mg daily of lasix when her weight is up

## 2021-06-09 NOTE — TELEPHONE ENCOUNTER
NPs are not allowed to give clearance for this  She will have to get it from her PCP or Dr Carlene Wetzel

## 2021-06-09 NOTE — TELEPHONE ENCOUNTER
I have not seen her since her discharge, so I really cannot give the okay. It will have to be up to the GYN to make the call.     EVELIN

## 2021-06-09 NOTE — TELEPHONE ENCOUNTER
Called pt about the message below and she verbalized understanding. ~patient stated that she was going to have it done.

## 2021-06-09 NOTE — TELEPHONE ENCOUNTER
Dr Adeline Buchanan is her lupus doctor at [facility] and she told this patient to continue taking her prednisone . Patient was called by this office yesterday to discuss her lasix and to increase it if she had a weight gain . Her prednisone makes her retain water so she IS going to gain weight . She sent a message to Dr Margarita Loaiza asking  her that since the prednisone caused her to retain fluid should she really still be taking it and that doctor said yes . What should she do ? She has appt with BRUCE 6/15/21 but needs to know what to do before that . She is getting  in early August and wanted to have a good echo so she didn't have to have the defibrillator so she is thinking she shouldn't take the prednisone .

## 2021-06-14 ASSESSMENT — ENCOUNTER SYMPTOMS
RESPIRATORY NEGATIVE: 1
GASTROINTESTINAL NEGATIVE: 1

## 2021-06-14 NOTE — PROGRESS NOTES
[facility], [place]   Congestive Heart Failure    Primary Care Doctor:  Julio Lopez MD  Primary Cardiologist: Fabrice Dyer       Chief Complaint:  CHF    History of Present Illness:  Aden Hernandez is a 39 y.o. female with  PMH anemia, asthma, anxiety, SLE, COVID in March, new dx NICM and HFrEF (25%) in May 2021 who presents today for CHF f/u . At her hosp f/u visit, coreg increased and after labs lasix decreased to 20/day    Today:  she reports dizziness has resolved, is signed up for CR and is feeling well. She is wearing lifevest but is getting  in [facility] and hopes to have it off by then. She denies chest pain, dyspnea, palpitations, orthopnea, PND, exertional chest pressure/discomfort, fatigue, early saiety, edema, syncope. She still has  Anxiety, but her BP is a little better and pulse 80s-90. Wt is down 2lb after stopping meloxicam     Today's home wt: 166      Baseline Weight: 166? Wt Readings from Last 3 Encounters:   06/15/21 171 lb (77.6 kg)   06/01/21 171 lb (77.6 kg)   05/26/21 172 lb 3.2 oz (78.1 kg)          EF: 25%  Cardiac Imaging:  Echo:  5/24/21:   -The left ventricle is dilated.   -Ejection fraction is visually estimated to be 25%.  -There is mild hypertrophy of the posterior wall.   -There is diffuse global hypokinesis.   -The inferolateral wall appears hypokinetic.   -Grade I diastolic dysfunction with normal LV filling pressures. E/e' =   15.4.   -The left atrium is dilated.   -Trivial aortic regurgitation.   -Moderate to severe mitral regurgitation.   -There is at least moderate tricuspid regurgitation. RVSP = 36 mmHG.   -Mild pulmonic regurgitation present  [facility] 5/25/21:   Anatomy:   LM-normal  LAD-normal  Cx-normal  OM1- normal  RCA-normal  Left dominant  LVEF-25%, 3+ MR        Hemodynamics:  RA- 4/3 mean 0  RV-28/-1, 4  PAWP-9/4 mean 5  PA- 27/10 mean 17     C. O. 6.1   Impression:  1.  Normal coronary arteries. 2.  Severe LV systolic dysfunction.   3.  Moderate to severe mitral regurgitation. 4.  Normal right-sided pressures.     Device: Lifevest      Activity: at baseline  Can you walk 1-2 blocks or do a moderate amount of house/yard work? Yes  Cardiac Rehab Referral: Yes, 21     NYHA Class: I     Sodium Restrictions: 3g  Fluid Restrictions: 48-64 oz/day  Sodium and fluid restriction compliance: good    Pt Education: The patient has received education on the following topics: dietary sodium restriction, heart failure medications, the importance of physical activity, symptom management and weight monitoring         Past Medical History:   has a past medical history of Anemia, Asthma, History of 2019 novel coronavirus disease (COVID-19), and Systemic lupus erythematosus ([facility] [address] Surgical History:   has a past surgical history that includes Tubal ligation; Tubal ligation; eye surgery; and  section, low transverse (N/A, ). Social History:   reports that she has never smoked. She has never used smokeless tobacco. She reports that she does not drink alcohol and does not use drugs. Family History:   Family History   Problem Relation Age of Onset    Early Death Mother     High Blood Pressure Mother     Substance Abuse Mother     Cancer Father     Substance Abuse Father        Home Medications:  Prior to Admission medications    Medication Sig Start Date End Date Taking? Authorizing Provider   LORazepam (ATIVAN) 0.5 MG tablet Take 0.25 mg by mouth nightly.      Historical Provider, MD   carvedilol (COREG) 6.25 MG tablet Take 1 tablet by mouth 2 times daily (with meals) 21   ALEXIS Ocampo - CNP   lisinopril (PRINIVIL;ZESTRIL) 5 MG tablet Take 1 tablet by mouth daily 21   Charu Molina MD   furosemide (LASIX) 40 MG tablet Take 1 tablet by mouth daily 21   Charu Molina MD   albuterol sulfate  (90 Base) MCG/ACT inhaler Inhale 2 puffs into the lungs every 6 hours as needed for Shortness of Breath 21   ALEXIS Garza - CNP Mood and Affect: Mood normal.         Behavior: Behavior normal.         Thought Content: Thought content normal.         Judgment: Judgment normal.         Lab Data:    CBC:   Lab Results   Component Value Date    WBC 8.6 05/26/2021    WBC 7.8 05/25/2021    WBC 6.6 05/23/2021    RBC 3.96 05/26/2021    RBC 3.69 05/25/2021    RBC 3.71 05/23/2021    HGB 12.8 05/26/2021    HGB 11.9 05/25/2021    HGB 12.0 05/23/2021    HCT 39.2 05/26/2021    HCT 36.0 05/25/2021    HCT 36.4 05/23/2021    MCV 99.1 05/26/2021    MCV 97.5 05/25/2021    MCV 98.1 05/23/2021    RDW 15.3 05/26/2021    RDW 14.9 05/25/2021    RDW 14.6 05/23/2021     05/26/2021     05/25/2021     05/23/2021     BMP:  Lab Results   Component Value Date     06/08/2021     05/26/2021     05/25/2021    K 4.1 06/08/2021    K 3.8 05/26/2021    K 3.7 05/25/2021    K 3.7 05/23/2021    K 2.6 05/14/2021    CL 98 06/08/2021    CL 99 05/26/2021     05/25/2021    CO2 24 06/08/2021    CO2 24 05/26/2021    CO2 26 05/25/2021    PHOS 4.1 05/26/2021    PHOS 3.3 05/25/2021    PHOS 3.3 05/16/2021    BUN 15 06/08/2021    BUN 18 05/26/2021    BUN 15 05/25/2021    CREATININE 0.8 06/08/2021    CREATININE 0.9 05/26/2021    CREATININE 0.8 05/25/2021     BNP:   Lab Results   Component Value Date    PROBNP 179 06/08/2021    PROBNP 1,528 05/25/2021    PROBNP 4,158 05/23/2021     Iron Studies:  No results found for: FERRITIN  No results found for: IRON, TIBC, FERRITIN   Iron Deficiency Anemia:  No IV Iron Therapy:  No  2017 ACC/AHA HF Guidelines:   intravenous iron replacement in patients with New York Heart Association (NYHA) class II and III HF and iron deficiency(ferritin <100 ng/ml or 100-300 ng/ml if transferrin saturation <20%), to improve functional status and QoL. Assessment/Plan:    1.  Dilated cardiomyopathy ([address] - start wil, continue ACE and coreg, lifevest,  Will get HF f/u and quick look echo late July to try and take off LV before wedding in August   2. Acute systolic (congestive) heart failure (HCC) - compensated, decrease lasix to prn only, labs next week             Instructions:   1. Medications: start spironolactone 12.5mg once a day and only take lasix as needed for wt gain 3lb or more  2. Labs: next week  3. Lifestyle Recommendations: Weigh yourself every day in the morning after urination, call Marbin Morris if wt increases 2-3lb in one day or 5lb in one week, Limit sodium to 2000mg/day and fluids to 2L or 64oz/day.    4. Follow up: 2 weeks with [facility] CHF Resource Line: [phone]        I appreciate the opportunity of cooperating in the care of this individual.    Yancy Singh, ALEXIS - CNP, CNP, 6/14/2021, 12:54 PM

## 2021-06-15 ENCOUNTER — OFFICE VISIT (OUTPATIENT)
Dept: CARDIOLOGY CLINIC | Age: 46
End: 2021-06-15
Payer: COMMERCIAL

## 2021-06-15 VITALS
WEIGHT: 171 LBS | SYSTOLIC BLOOD PRESSURE: 99 MMHG | OXYGEN SATURATION: 99 % | DIASTOLIC BLOOD PRESSURE: 70 MMHG | HEIGHT: 62 IN | HEART RATE: 89 BPM | BODY MASS INDEX: 31.47 KG/M2

## 2021-06-15 DIAGNOSIS — I42.0 DILATED CARDIOMYOPATHY (HCC): Primary | ICD-10-CM

## 2021-06-15 DIAGNOSIS — I50.21 ACUTE SYSTOLIC (CONGESTIVE) HEART FAILURE (HCC): ICD-10-CM

## 2021-06-15 PROCEDURE — G8427 DOCREV CUR MEDS BY ELIG CLIN: HCPCS | Performed by: NURSE PRACTITIONER

## 2021-06-15 PROCEDURE — G8417 CALC BMI ABV UP PARAM F/U: HCPCS | Performed by: NURSE PRACTITIONER

## 2021-06-15 PROCEDURE — 1036F TOBACCO NON-USER: CPT | Performed by: NURSE PRACTITIONER

## 2021-06-15 PROCEDURE — 99213 OFFICE O/P EST LOW 20 MIN: CPT | Performed by: NURSE PRACTITIONER

## 2021-06-15 PROCEDURE — 1111F DSCHRG MED/CURRENT MED MERGE: CPT | Performed by: NURSE PRACTITIONER

## 2021-06-15 RX ORDER — FUROSEMIDE 20 MG/1
20 TABLET ORAL PRN
Qty: 60 TABLET | Refills: 2 | Status: SHIPPED | OUTPATIENT
Start: 2021-06-15

## 2021-06-15 RX ORDER — LISINOPRIL 5 MG/1
5 TABLET ORAL DAILY
Qty: 90 TABLET | Refills: 3 | Status: SHIPPED | OUTPATIENT
Start: 2021-06-15 | End: 2021-08-06

## 2021-06-15 RX ORDER — SPIRONOLACTONE 25 MG/1
12.5 TABLET ORAL DAILY
Qty: 30 TABLET | Refills: 3 | Status: SHIPPED | OUTPATIENT
Start: 2021-06-15 | End: 2022-02-11

## 2021-06-28 ASSESSMENT — ENCOUNTER SYMPTOMS
GASTROINTESTINAL NEGATIVE: 1
RESPIRATORY NEGATIVE: 1

## 2021-06-28 NOTE — PROGRESS NOTES
108 (90 Base) MCG/ACT inhaler Inhale 2 puffs into the lungs every 6 hours as needed for Shortness of Breath 5/16/21   ALEXIS Henley - SARAH   hydroxychloroquine (PLAQUENIL) 200 MG tablet Take 200 mg by mouth 2 times daily    Historical Provider, MD   buPROPion (WELLBUTRIN XL) 300 MG extended release tablet Take 300 mg by mouth every morning    Historical Provider, MD   vitamin B-12 (CYANOCOBALAMIN) 500 MCG tablet Take 500 mcg by mouth daily    Historical Provider, MD   vitamin D (CHOLECALCIFEROL) 25 MCG (1000 UT) TABS tablet Take 1,000 Units by mouth daily    Historical Provider, MD   meloxicam (MOBIC) 15 MG tablet Take 15 mg by mouth daily  Patient not taking: Reported on 6/15/2021    Historical Provider, MD        Allergies:  Shellfish-derived products and Penicillins     ROS:   Review of Systems   Constitutional: Negative. Respiratory: Negative. Cardiovascular: Negative. Gastrointestinal: Negative. Genitourinary: Negative. Musculoskeletal: Negative. Skin: Negative. Neurological: Negative. Hematological: Negative. Psychiatric/Behavioral: Negative. Physical Examination:    Vitals:    06/29/21 0907   BP: 118/80   Site: Left Upper Arm   Position: Sitting   Cuff Size: Large Adult   Pulse: 87   SpO2: 98%   Weight: 171 lb (77.6 kg)   Height: 5' 2\" (1.575 m)           Physical Exam  Vitals reviewed. Constitutional:       Appearance: Normal appearance. She is normal weight. HENT:      Head: Normocephalic and atraumatic. Eyes:      Extraocular Movements: Extraocular movements intact. Pupils: Pupils are equal, round, and reactive to light. Cardiovascular:      Rate and Rhythm: Normal rate and regular rhythm. Pulses: Normal pulses. Heart sounds: Normal heart sounds. Pulmonary:      Effort: Pulmonary effort is normal.      Breath sounds: Normal breath sounds. Abdominal:      Palpations: Abdomen is soft.    Musculoskeletal:         General: Normal range of motion. Cervical back: Normal range of motion and neck supple. Skin:     General: Skin is warm and dry. Neurological:      General: No focal deficit present. Mental Status: She is alert and oriented to person, place, and time. Psychiatric:         Mood and Affect: Mood normal.         Behavior: Behavior normal.         Thought Content:  Thought content normal.         Judgment: Judgment normal.         Lab Data:    CBC:   Lab Results   Component Value Date    WBC 8.6 05/26/2021    WBC 7.8 05/25/2021    WBC 6.6 05/23/2021    RBC 3.96 05/26/2021    RBC 3.69 05/25/2021    RBC 3.71 05/23/2021    HGB 12.8 05/26/2021    HGB 11.9 05/25/2021    HGB 12.0 05/23/2021    HCT 39.2 05/26/2021    HCT 36.0 05/25/2021    HCT 36.4 05/23/2021    MCV 99.1 05/26/2021    MCV 97.5 05/25/2021    MCV 98.1 05/23/2021    RDW 15.3 05/26/2021    RDW 14.9 05/25/2021    RDW 14.6 05/23/2021     05/26/2021     05/25/2021     05/23/2021     BMP:  Lab Results   Component Value Date     06/08/2021     05/26/2021     05/25/2021    K 4.1 06/08/2021    K 3.8 05/26/2021    K 3.7 05/25/2021    K 3.7 05/23/2021    K 2.6 05/14/2021    CL 98 06/08/2021    CL 99 05/26/2021     05/25/2021    CO2 24 06/08/2021    CO2 24 05/26/2021    CO2 26 05/25/2021    PHOS 4.1 05/26/2021    PHOS 3.3 05/25/2021    PHOS 3.3 05/16/2021    BUN 15 06/08/2021    BUN 18 05/26/2021    BUN 15 05/25/2021    CREATININE 0.8 06/08/2021    CREATININE 0.9 05/26/2021    CREATININE 0.8 05/25/2021     BNP:   Lab Results   Component Value Date    PROBNP 179 06/08/2021    PROBNP 1,528 05/25/2021    PROBNP 4,158 05/23/2021     Iron Studies:  No results found for: FERRITIN  No results found for: IRON, TIBC, FERRITIN   Iron Deficiency Anemia:  No    IV Iron Therapy:  No  2017 ACC/AHA HF Guidelines:   intravenous iron replacement in patients with New York Heart Association (NYHA) class II and III HF and iron deficiency(ferritin <100 ng/ml or 100-300 ng/ml if transferrin saturation <20%), to improve functional status and QoL. Assessment/Plan:    1. Dilated cardiomyopathy ([facility] [address] -  continue ACE, wil and increase coreg, lifevest,  Will get HF f/u and quick look echo late July to try and take off LV before wedding in August   2. systolic (congestive) heart failure ([facility] [address] - compensated, labs done today   3. SOB - resolved         Instructions:   1. Medications: increase carvedilol to 12.5mg twice a day and continue other meds  2. Lifestyle Recommendations: Weigh yourself every day in the morning after urination, call [place] if wt increases 2-3lb in one day or 5lb in one week, Limit sodium to 2000mg/day and fluids to 2L or 64oz/day.    3. Follow up: 2-3 weeks with [facility] CHF Resource Line: [phone]        I appreciate the opportunity of cooperating in the care of this individual.    Dallis Spurling, APRN - CNP, CNP, 6/28/2021, 1:13 PM

## 2021-06-29 ENCOUNTER — TELEPHONE (OUTPATIENT)
Dept: CARDIOLOGY CLINIC | Age: 46
End: 2021-06-29

## 2021-06-29 ENCOUNTER — OFFICE VISIT (OUTPATIENT)
Dept: CARDIOLOGY CLINIC | Age: 46
End: 2021-06-29
Payer: COMMERCIAL

## 2021-06-29 ENCOUNTER — HOSPITAL ENCOUNTER (OUTPATIENT)
Age: 46
Discharge: HOME | End: 2021-06-29
Payer: COMMERCIAL

## 2021-06-29 VITALS
WEIGHT: 171 LBS | BODY MASS INDEX: 31.47 KG/M2 | DIASTOLIC BLOOD PRESSURE: 80 MMHG | SYSTOLIC BLOOD PRESSURE: 118 MMHG | OXYGEN SATURATION: 98 % | HEART RATE: 87 BPM | HEIGHT: 62 IN

## 2021-06-29 DIAGNOSIS — R06.02 SOB (SHORTNESS OF BREATH): ICD-10-CM

## 2021-06-29 DIAGNOSIS — I50.22 CHRONIC SYSTOLIC CONGESTIVE HEART FAILURE (HCC): ICD-10-CM

## 2021-06-29 DIAGNOSIS — I42.0 DILATED CARDIOMYOPATHY (HCC): ICD-10-CM

## 2021-06-29 DIAGNOSIS — I42.0 DILATED CARDIOMYOPATHY (HCC): Primary | ICD-10-CM

## 2021-06-29 DIAGNOSIS — I50.21 ACUTE SYSTOLIC (CONGESTIVE) HEART FAILURE (HCC): ICD-10-CM

## 2021-06-29 LAB
ANION GAP SERPL CALCULATED.3IONS-SCNC: 11 MMOL/L (ref 3–16)
BUN BLDV-MCNC: 14 MG/DL (ref 7–20)
CALCIUM SERPL-MCNC: 9 MG/DL (ref 8.3–10.6)
CHLORIDE BLD-SCNC: 101 MMOL/L (ref 99–110)
CO2: 24 MMOL/L (ref 21–32)
CREAT SERPL-MCNC: 0.9 MG/DL (ref 0.6–1.1)
GFR AFRICAN AMERICAN: >60
GFR NON-AFRICAN AMERICAN: >60
GLUCOSE BLD-MCNC: 93 MG/DL (ref 70–99)
POTASSIUM SERPL-SCNC: 4.5 MMOL/L (ref 3.5–5.1)
PRO-BNP: 238 PG/ML (ref 0–124)
SODIUM BLD-SCNC: 136 MMOL/L (ref 136–145)

## 2021-06-29 PROCEDURE — G8417 CALC BMI ABV UP PARAM F/U: HCPCS | Performed by: NURSE PRACTITIONER

## 2021-06-29 PROCEDURE — 1036F TOBACCO NON-USER: CPT | Performed by: NURSE PRACTITIONER

## 2021-06-29 PROCEDURE — 99213 OFFICE O/P EST LOW 20 MIN: CPT | Performed by: NURSE PRACTITIONER

## 2021-06-29 PROCEDURE — G8427 DOCREV CUR MEDS BY ELIG CLIN: HCPCS | Performed by: NURSE PRACTITIONER

## 2021-06-29 PROCEDURE — 36415 COLL VENOUS BLD VENIPUNCTURE: CPT

## 2021-06-29 PROCEDURE — 83880 ASSAY OF NATRIURETIC PEPTIDE: CPT

## 2021-06-29 PROCEDURE — 80048 BASIC METABOLIC PNL TOTAL CA: CPT

## 2021-06-29 RX ORDER — CARVEDILOL 12.5 MG/1
12.5 TABLET ORAL 2 TIMES DAILY WITH MEALS
Qty: 60 TABLET | Refills: 5 | Status: SHIPPED | OUTPATIENT
Start: 2021-06-29 | End: 2021-07-29 | Stop reason: ALTCHOICE

## 2021-06-29 NOTE — TELEPHONE ENCOUNTER
----- Message from ALEXIS Vo CNP sent at 6/29/2021  3:12 PM EDT -----  Labs look great, no change in meds besides what we did in clinic today.  Husam Lunsford

## 2021-06-29 NOTE — TELEPHONE ENCOUNTER
I spoke to patient with Labs results per Domenic Allen. Patient verbalized understanding. Advised patient to call back with any questions.

## 2021-07-01 ENCOUNTER — OFFICE VISIT (OUTPATIENT)
Dept: PULMONOLOGY | Age: 46
End: 2021-07-01
Payer: COMMERCIAL

## 2021-07-01 ENCOUNTER — HOSPITAL ENCOUNTER (OUTPATIENT)
Dept: CT IMAGING | Age: 46
Discharge: HOME | End: 2021-07-01
Payer: COMMERCIAL

## 2021-07-01 VITALS
DIASTOLIC BLOOD PRESSURE: 68 MMHG | HEART RATE: 82 BPM | HEIGHT: 62 IN | SYSTOLIC BLOOD PRESSURE: 112 MMHG | WEIGHT: 171 LBS | OXYGEN SATURATION: 99 % | BODY MASS INDEX: 31.47 KG/M2

## 2021-07-01 DIAGNOSIS — Z86.16 HISTORY OF COVID-19: Primary | ICD-10-CM

## 2021-07-01 DIAGNOSIS — I42.8 NON-ISCHEMIC CARDIOMYOPATHY (HCC): ICD-10-CM

## 2021-07-01 DIAGNOSIS — M32.9 HX OF SYSTEMIC LUPUS ERYTHEMATOSUS (SLE) (HCC): ICD-10-CM

## 2021-07-01 DIAGNOSIS — Z86.16 HISTORY OF 2019 NOVEL CORONAVIRUS DISEASE (COVID-19): ICD-10-CM

## 2021-07-01 PROCEDURE — G8417 CALC BMI ABV UP PARAM F/U: HCPCS | Performed by: INTERNAL MEDICINE

## 2021-07-01 PROCEDURE — G8427 DOCREV CUR MEDS BY ELIG CLIN: HCPCS | Performed by: INTERNAL MEDICINE

## 2021-07-01 PROCEDURE — 99214 OFFICE O/P EST MOD 30 MIN: CPT | Performed by: INTERNAL MEDICINE

## 2021-07-01 PROCEDURE — 1036F TOBACCO NON-USER: CPT | Performed by: INTERNAL MEDICINE

## 2021-07-01 PROCEDURE — 71250 CT THORAX DX C-: CPT

## 2021-07-01 ASSESSMENT — ENCOUNTER SYMPTOMS
CHEST TIGHTNESS: 0
ANAL BLEEDING: 0
DIARRHEA: 0
VOICE CHANGE: 0
BLOOD IN STOOL: 0
BACK PAIN: 0
CONSTIPATION: 0
SINUS PRESSURE: 0
ABDOMINAL PAIN: 0
ABDOMINAL DISTENTION: 0
APNEA: 0
CHOKING: 0
SORE THROAT: 0
WHEEZING: 0
STRIDOR: 0
COUGH: 0
SHORTNESS OF BREATH: 0
RHINORRHEA: 0

## 2021-07-01 NOTE — PROGRESS NOTES
Terrance Lazaro    YOB: 1975     Date of Service:  7/1/2021     Chief Complaint   Patient presents with    1 Month Follow-Up     HX COVID    Results     CT CHEST 07/01/21         HPI patient states that she has no significant shortness of breath or pedal edema, weight has been steady. Patient is currently on meloxicam for history of lupus/arthritis. Still has lethargy/tiredness, not completely recovered since her diagnosis of COVID-19. Patient is here for the results of CT chest which was performed today. Allergies   Allergen Reactions    Shellfish-Derived Products Hives    Penicillins Hives     Outpatient Medications Marked as Taking for the 7/1/21 encounter (Office Visit) with Yuly Morel MD   Medication Sig Dispense Refill    carvedilol (COREG) 12.5 MG tablet Take 1 tablet by mouth 2 times daily (with meals) 60 tablet 5    furosemide (LASIX) 20 MG tablet Take 1 tablet by mouth as needed (wt gain 3lb or more) 60 tablet 2    spironolactone (ALDACTONE) 25 MG tablet Take 0.5 tablets by mouth daily 30 tablet 3    lisinopril (PRINIVIL;ZESTRIL) 5 MG tablet Take 1 tablet by mouth daily 90 tablet 3    LORazepam (ATIVAN) 0.5 MG tablet Take 0.5 mg by mouth 2 times daily.        albuterol sulfate  (90 Base) MCG/ACT inhaler Inhale 2 puffs into the lungs every 6 hours as needed for Shortness of Breath 1 Inhaler 0    hydroxychloroquine (PLAQUENIL) 200 MG tablet Take 200 mg by mouth 2 times daily      buPROPion (WELLBUTRIN XL) 300 MG extended release tablet Take 300 mg by mouth every morning      vitamin B-12 (CYANOCOBALAMIN) 500 MCG tablet Take 500 mcg by mouth daily      vitamin D (CHOLECALCIFEROL) 25 MCG (1000 UT) TABS tablet Take 1,000 Units by mouth daily      meloxicam (MOBIC) 15 MG tablet Take 15 mg by mouth daily          Immunization History   Administered Date(s) Administered    Influenza Virus Vaccine 09/01/2017    MMR 05/03/2013    PPD Test 05/03/2013, 2015    Rho (D) Immune Globulin 10/19/2012, 10/15/2014    Rho(d) Immune Globulin- Iv Or Im 10/19/2012, 10/15/2014    Tdap (Boostrix, Adacel) 2013, 2013       Past Medical History:   Diagnosis Date    Anemia     Asthma     no attack since teenager no meds    History of 2019 novel coronavirus disease (COVID-19)     Systemic lupus erythematosus ([facility] [address]      Past Surgical History:   Procedure Laterality Date     SECTION, LOW TRANSVERSE N/A     EYE SURGERY      TUBAL LIGATION      reversal     TUBAL LIGATION      TL reversed in      Family History   Problem Relation Age of Onset    Early Death Mother     High Blood Pressure Mother     Substance Abuse Mother     Cancer Father     Substance Abuse Father        Review of Systems:  Review of Systems   Constitutional: Positive for fatigue. Negative for activity change, appetite change and fever. HENT: Negative for congestion, ear discharge, ear pain, postnasal drip, rhinorrhea, sinus pressure, sneezing, sore throat, tinnitus and voice change. Respiratory: Negative for apnea, cough, choking, chest tightness, shortness of breath, wheezing and stridor. Cardiovascular: Negative for chest pain, palpitations and leg swelling. Gastrointestinal: Negative for abdominal distention, abdominal pain, anal bleeding, blood in stool, constipation and diarrhea. Musculoskeletal: Negative for arthralgias, back pain and gait problem. Skin: Negative for pallor and rash. Allergic/Immunologic: Negative for environmental allergies. Neurological: Negative for dizziness, tremors, seizures, syncope, speech difficulty, weakness, light-headedness, numbness and headaches. Hematological: Negative for adenopathy. Does not bruise/bleed easily. Psychiatric/Behavioral: Negative for sleep disturbance.        Vitals:    21 0834   BP: 112/68   Pulse: 82   SpO2: 99%   Weight: 171 lb (77.6 kg)   Height: 5' 2\" (1.575 m)     No flowsheet data found.   Body mass index is 31.28 kg/m². Wt Readings from Last 3 Encounters:   07/01/21 171 lb (77.6 kg)   06/29/21 171 lb (77.6 kg)   06/15/21 171 lb (77.6 kg)     BP Readings from Last 3 Encounters:   07/01/21 112/68   06/29/21 118/80   06/15/21 99/70         Physical Exam  Constitutional:       General: She is not in acute distress. Appearance: She is well-developed. She is not diaphoretic. HENT:      Mouth/Throat:      Pharynx: No oropharyngeal exudate. Cardiovascular:      Rate and Rhythm: Normal rate and regular rhythm. Heart sounds: Normal heart sounds. No murmur heard. Pulmonary:      Effort: No respiratory distress. Breath sounds: Normal breath sounds. No wheezing or rales. Chest:      Chest wall: No tenderness. Abdominal:      General: There is no distension. Palpations: There is no mass. Tenderness: There is no abdominal tenderness. There is no guarding or rebound. Musculoskeletal:         General: No swelling, tenderness or deformity. Skin:     Coloration: Skin is not pale. Findings: No erythema or rash. Neurological:      Mental Status: She is alert and oriented to person, place, and time. Cranial Nerves: No cranial nerve deficit. Motor: No abnormal muscle tone.       Coordination: Coordination normal.      Deep Tendon Reflexes: Reflexes normal.             Health Maintenance   Topic Date Due    Pneumococcal 0-64 years Vaccine (1 of 2 - PPSV23) Never done    COVID-19 Vaccine (1) Never done    Lipid screen  Never done    Flu vaccine (1) 09/01/2021    Potassium monitoring  06/29/2022    Creatinine monitoring  06/29/2022    DTaP/Tdap/Td vaccine (3 - Td or Tdap) 05/03/2023    Cervical cancer screen  04/19/2026    Hepatitis C screen  Completed    HIV screen  Completed    Hepatitis A vaccine  Aged Out    Hepatitis B vaccine  Aged Out    Hib vaccine  Aged Out    Meningococcal (ACWY) vaccine  Aged Out          Assessment/Plan:    Patient was hospitalized in May for pulmonary edema and severe nonischemic cardiomyopathy with EF of 25%/severe MR. This is almost certainly related to her recent history of COVID-19 infection. Patient also has history of SLE, with positive double-stranded DNA and anti-Smith antibody. Follows with cardiology-continues on Lasix, dose of Coreg was increased. Plans for repeat limited 2D echo to evaluate EF towards the end of the month. Will defer plans for MRI of the heart if necessary to cardiology. CT chest performed today-personally reviewed by me, complete resolution of infiltrates/pleural effusions noted. This is suggestive of resolved pulmonary edema, was reassured to the patient. No further imaging would be necessary from pulmonary standpoint. Patient currently has no significant pulmonary issues. Advised about monitoring weight and volume status. Pulmonary will follow as needed only. No follow-ups on file.

## 2021-07-06 ENCOUNTER — TELEPHONE (OUTPATIENT)
Dept: CARDIOLOGY CLINIC | Age: 46
End: 2021-07-06

## 2021-07-09 ASSESSMENT — ENCOUNTER SYMPTOMS
RESPIRATORY NEGATIVE: 1
GASTROINTESTINAL NEGATIVE: 1

## 2021-07-09 NOTE — PROGRESS NOTES
[address]   Congestive Heart Failure    Primary Care Doctor:  Soo Varela MD  Primary Cardiologist: Verna Mcgowan       Chief Complaint:  CHF    History of Present Illness:  Gwen Villalobos is a 39 y.o. female with  PMH anemia, asthma, anxiety, SLE, COVID in March, new dx NICM and HFrEF (25%) in May 2021 who presents today for CHF f/u . At her last OV,coreg increased    Today:  She is feeling well and denies dyspnea, palpitations, orthopnea, PND, exertional chest pressure/discomfort, fatigue, early saiety, edema, syncope. She is starting CR soon and tells me that she stopped wearing lifevest a couple weeks ago due to constant alarming and the effect it had on her anxiety. Today's home wt: 169      Baseline Weight: 166-170  Wt Readings from Last 3 Encounters:   07/01/21 171 lb (77.6 kg)   06/29/21 171 lb (77.6 kg)   06/15/21 171 lb (77.6 kg)          EF: 25%  Cardiac Imaging:  Echo:  5/24/21:   -The left ventricle is dilated.   -Ejection fraction is visually estimated to be 25%.  -There is mild hypertrophy of the posterior wall.   -There is diffuse global hypokinesis.   -The inferolateral wall appears hypokinetic.   -Grade I diastolic dysfunction with normal LV filling pressures. E/e' =   15.4.   -The left atrium is dilated.   -Trivial aortic regurgitation.   -Moderate to severe mitral regurgitation.   -There is at least moderate tricuspid regurgitation. RVSP = 36 mmHG.   -Mild pulmonic regurgitation present    [facility] 5/25/21:   Anatomy:   LM-normal  LAD-normal  Cx-normal  OM1- normal  RCA-normal  Left dominant  LVEF-25%, 3+ MR        Hemodynamics:  RA- 4/3 mean 0  RV-28/-1, 4  PAWP-9/4 mean 5  PA- 27/10 mean 17     C. O. 6.1   Impression:  1.  Normal coronary arteries. 2.  Severe LV systolic dysfunction. 3.  Moderate to severe mitral regurgitation. 4.  Normal right-sided pressures. Activity: at baseline  Can you walk 1-2 blocks or do a moderate amount of house/yard work? Yes  Cardiac Rehab Referral: Yes, 21     NYHA Class: I     Sodium Restrictions: 3g  Fluid Restrictions: 48-64 oz/day  Sodium and fluid restriction compliance: good    Pt Education: The patient has received education on the following topics: dietary sodium restriction, heart failure medications, the importance of physical activity, symptom management and weight monitoring         Past Medical History:   has a past medical history of Anemia, Asthma, History of 2019 novel coronavirus disease (COVID-19), and Systemic lupus erythematosus ([facility] [address] Surgical History:   has a past surgical history that includes Tubal ligation; Tubal ligation; eye surgery; and  section, low transverse (N/A, ). Social History:   reports that she has never smoked. She has never used smokeless tobacco. She reports that she does not drink alcohol and does not use drugs. Family History:   Family History   Problem Relation Age of Onset    Early Death Mother     High Blood Pressure Mother     Substance Abuse Mother     Cancer Father     Substance Abuse Father        Home Medications:  Prior to Admission medications    Medication Sig Start Date End Date Taking? Authorizing Provider   carvedilol (COREG) 12.5 MG tablet Take 1 tablet by mouth 2 times daily (with meals) 21   ALEXIS Rosa CNP   furosemide (LASIX) 20 MG tablet Take 1 tablet by mouth as needed (wt gain 3lb or more) 6/15/21   ALEXIS Rosa CNP   spironolactone (ALDACTONE) 25 MG tablet Take 0.5 tablets by mouth daily 6/15/21   ALEXIS Rosa CNP   lisinopril (PRINIVIL;ZESTRIL) 5 MG tablet Take 1 tablet by mouth daily 6/15/21   ALEXIS Rosa CNP   LORazepam (ATIVAN) 0.5 MG tablet Take 0.5 mg by mouth 2 times daily.      Historical Provider, MD   albuterol sulfate  (90 Base) MCG/ACT inhaler Inhale 2 puffs into the lungs every 6 hours as needed for Shortness of Breath 21   ALEXIS Henley CNP   hydroxychloroquine (PLAQUENIL) 200 MG tablet Take 200 mg by mouth 2 times daily    Historical Provider, MD   buPROPion (WELLBUTRIN XL) 300 MG extended release tablet Take 300 mg by mouth every morning    Historical Provider, MD   vitamin B-12 (CYANOCOBALAMIN) 500 MCG tablet Take 500 mcg by mouth daily    Historical Provider, MD   vitamin D (CHOLECALCIFEROL) 25 MCG (1000 UT) TABS tablet Take 1,000 Units by mouth daily    Historical Provider, MD   meloxicam (MOBIC) 15 MG tablet Take 15 mg by mouth daily     Historical Provider, MD        Allergies:  Shellfish-derived products and Penicillins     ROS:   Review of Systems   Constitutional: Negative. Respiratory: Negative. Cardiovascular: Negative. Gastrointestinal: Negative. Genitourinary: Negative. Musculoskeletal: Negative. Skin: Negative. Neurological: Negative. Hematological: Negative. Psychiatric/Behavioral: Negative. Physical Examination:    Vitals:    07/12/21 0925   BP: 101/62   Site: Right Upper Arm   Pulse: 87   SpO2: 98%   Weight: 169 lb (76.7 kg)   Height: 5' 2\" (1.575 m)           Physical Exam  Vitals reviewed. Constitutional:       Appearance: Normal appearance. She is normal weight. HENT:      Head: Normocephalic and atraumatic. Eyes:      Extraocular Movements: Extraocular movements intact. Pupils: Pupils are equal, round, and reactive to light. Cardiovascular:      Rate and Rhythm: Normal rate and regular rhythm. Pulses: Normal pulses. Heart sounds: Normal heart sounds. Pulmonary:      Effort: Pulmonary effort is normal.      Breath sounds: Normal breath sounds. Abdominal:      Palpations: Abdomen is soft. Musculoskeletal:         General: Normal range of motion. Cervical back: Normal range of motion and neck supple. Skin:     General: Skin is warm and dry. Neurological:      General: No focal deficit present. Mental Status: She is alert and oriented to person, place, and time.    Psychiatric:         Mood and labs done today             Instructions:   1. Medications:continue current medications  2. Lifestyle Recommendations: Weigh yourself every day in the morning after urination, call Ruben Bazzi if wt increases 2-3lb in one day or 5lb in one week, Limit sodium to 2000mg/day and fluids to 2L or 64oz/day.    3. Follow up: as scheduled      Satishmanan: [phone]        I appreciate the opportunity of cooperating in the care of this individual.    ALEXIS Goodman - CNP, CNP, 7/9/2021, 11:20 AM

## 2021-07-12 ENCOUNTER — OFFICE VISIT (OUTPATIENT)
Dept: CARDIOLOGY CLINIC | Age: 46
End: 2021-07-12
Payer: COMMERCIAL

## 2021-07-12 ENCOUNTER — HOSPITAL ENCOUNTER (OUTPATIENT)
Age: 46
Discharge: HOME | End: 2021-07-12
Payer: COMMERCIAL

## 2021-07-12 VITALS
OXYGEN SATURATION: 98 % | DIASTOLIC BLOOD PRESSURE: 62 MMHG | SYSTOLIC BLOOD PRESSURE: 101 MMHG | HEART RATE: 87 BPM | HEIGHT: 62 IN | WEIGHT: 169 LBS | BODY MASS INDEX: 31.1 KG/M2

## 2021-07-12 DIAGNOSIS — I50.22 CHRONIC SYSTOLIC CONGESTIVE HEART FAILURE (HCC): ICD-10-CM

## 2021-07-12 DIAGNOSIS — R06.02 SOB (SHORTNESS OF BREATH): ICD-10-CM

## 2021-07-12 DIAGNOSIS — I42.0 DILATED CARDIOMYOPATHY (HCC): Primary | ICD-10-CM

## 2021-07-12 DIAGNOSIS — I42.0 DILATED CARDIOMYOPATHY (HCC): ICD-10-CM

## 2021-07-12 LAB
ANION GAP SERPL CALCULATED.3IONS-SCNC: 11 MMOL/L (ref 3–16)
BUN BLDV-MCNC: 11 MG/DL (ref 7–20)
CALCIUM SERPL-MCNC: 9.4 MG/DL (ref 8.3–10.6)
CHLORIDE BLD-SCNC: 101 MMOL/L (ref 99–110)
CO2: 26 MMOL/L (ref 21–32)
CREAT SERPL-MCNC: 0.8 MG/DL (ref 0.6–1.1)
GFR AFRICAN AMERICAN: >60
GFR NON-AFRICAN AMERICAN: >60
GLUCOSE BLD-MCNC: 94 MG/DL (ref 70–99)
POTASSIUM SERPL-SCNC: 4.6 MMOL/L (ref 3.5–5.1)
PRO-BNP: 214 PG/ML (ref 0–124)
SODIUM BLD-SCNC: 138 MMOL/L (ref 136–145)

## 2021-07-12 PROCEDURE — 99213 OFFICE O/P EST LOW 20 MIN: CPT | Performed by: NURSE PRACTITIONER

## 2021-07-12 PROCEDURE — G8417 CALC BMI ABV UP PARAM F/U: HCPCS | Performed by: NURSE PRACTITIONER

## 2021-07-12 PROCEDURE — 80048 BASIC METABOLIC PNL TOTAL CA: CPT

## 2021-07-12 PROCEDURE — 1036F TOBACCO NON-USER: CPT | Performed by: NURSE PRACTITIONER

## 2021-07-12 PROCEDURE — 83880 ASSAY OF NATRIURETIC PEPTIDE: CPT

## 2021-07-12 PROCEDURE — G8427 DOCREV CUR MEDS BY ELIG CLIN: HCPCS | Performed by: NURSE PRACTITIONER

## 2021-07-12 PROCEDURE — 36415 COLL VENOUS BLD VENIPUNCTURE: CPT

## 2021-07-12 NOTE — PATIENT INSTRUCTIONS
Instructions:   1. Medications:continue current medications  2. Lifestyle Recommendations: Weigh yourself every day in the morning after urination, call Shaka Ozuna if wt increases 2-3lb in one day or 5lb in one week, Limit sodium to 2000mg/day and fluids to 2L or 64oz/day.    3. Follow up: as scheduled      Emmie: [phone]

## 2021-07-13 ENCOUNTER — TELEPHONE (OUTPATIENT)
Dept: CARDIOLOGY CLINIC | Age: 46
End: 2021-07-13

## 2021-07-13 NOTE — TELEPHONE ENCOUNTER
----- Message from ALEXIS King CNP sent at 7/12/2021  4:31 PM EDT -----  Labs look great, no change.  Rosie Mccartney

## 2021-07-27 ENCOUNTER — HOSPITAL ENCOUNTER (OUTPATIENT)
Dept: CARDIAC REHAB | Age: 46
Setting detail: THERAPIES SERIES
Discharge: HOME | End: 2021-07-27
Payer: COMMERCIAL

## 2021-07-27 VITALS
DIASTOLIC BLOOD PRESSURE: 84 MMHG | RESPIRATION RATE: 16 BRPM | HEIGHT: 63 IN | BODY MASS INDEX: 29.95 KG/M2 | SYSTOLIC BLOOD PRESSURE: 130 MMHG | WEIGHT: 169 LBS | HEART RATE: 73 BPM

## 2021-07-27 PROCEDURE — 93798 PHYS/QHP OP CAR RHAB W/ECG: CPT

## 2021-07-27 ASSESSMENT — PATIENT HEALTH QUESTIONNAIRE - PHQ9
SUM OF ALL RESPONSES TO PHQ QUESTIONS 1-9: 3
SUM OF ALL RESPONSES TO PHQ QUESTIONS 1-9: 3

## 2021-07-27 NOTE — PROGRESS NOTES
provider if she had any questions or concerns. PMHX and PSHX as well as home medications were reviewed and verified by pt. Plan of care was reviewed with pt. And goal set and agreed upon. Pt. Completed 6 minute walk test at this time. Pt. To attend the 0830 AM class time beginning Friday July 30, 2021.       NEHA Aguilar RN  7/27/2021

## 2021-07-29 ENCOUNTER — TELEPHONE (OUTPATIENT)
Dept: CARDIOLOGY CLINIC | Age: 46
End: 2021-07-29

## 2021-07-29 ENCOUNTER — OFFICE VISIT (OUTPATIENT)
Dept: CARDIOLOGY CLINIC | Age: 46
End: 2021-07-29
Payer: COMMERCIAL

## 2021-07-29 ENCOUNTER — HOSPITAL ENCOUNTER (OUTPATIENT)
Dept: NON INVASIVE DIAGNOSTICS | Age: 46
Discharge: HOME | End: 2021-07-29
Payer: COMMERCIAL

## 2021-07-29 VITALS
OXYGEN SATURATION: 96 % | HEIGHT: 62 IN | WEIGHT: 170 LBS | DIASTOLIC BLOOD PRESSURE: 62 MMHG | HEART RATE: 84 BPM | BODY MASS INDEX: 31.28 KG/M2 | SYSTOLIC BLOOD PRESSURE: 90 MMHG

## 2021-07-29 DIAGNOSIS — I42.0 DILATED CARDIOMYOPATHY (HCC): ICD-10-CM

## 2021-07-29 DIAGNOSIS — I50.21 ACUTE SYSTOLIC (CONGESTIVE) HEART FAILURE (HCC): ICD-10-CM

## 2021-07-29 DIAGNOSIS — I50.22 CHRONIC SYSTOLIC CONGESTIVE HEART FAILURE (HCC): Primary | ICD-10-CM

## 2021-07-29 PROCEDURE — 99213 OFFICE O/P EST LOW 20 MIN: CPT | Performed by: CLINICAL NURSE SPECIALIST

## 2021-07-29 PROCEDURE — G8417 CALC BMI ABV UP PARAM F/U: HCPCS | Performed by: CLINICAL NURSE SPECIALIST

## 2021-07-29 PROCEDURE — 1036F TOBACCO NON-USER: CPT | Performed by: CLINICAL NURSE SPECIALIST

## 2021-07-29 PROCEDURE — 93308 TTE F-UP OR LMTD: CPT

## 2021-07-29 PROCEDURE — G8427 DOCREV CUR MEDS BY ELIG CLIN: HCPCS | Performed by: CLINICAL NURSE SPECIALIST

## 2021-07-29 RX ORDER — METOPROLOL SUCCINATE 50 MG/1
50 TABLET, EXTENDED RELEASE ORAL DAILY
Qty: 90 TABLET | Refills: 0 | Status: SHIPPED | OUTPATIENT
Start: 2021-07-29 | End: 2021-09-29 | Stop reason: DRUGHIGH

## 2021-07-29 NOTE — PROGRESS NOTES
[facility]  Progress Note    Primary Care Doctor:  Criss Leger MD    Chief Complaint   Patient presents with    Congestive Heart Failure    Follow-Up from Hospital     echo         History of Present Illness:  39 y.o. female with history of anemia, asthma, anxiety, SLE (on plaquenil), covid in march, NICM and sHF      I had the pleasure of seeing Lidia Cordoba in follow up for sHF. She is ambulatory and by her self today. She had an echo done which preliminary shows improved LVEF to 40-45%. She has not started cardiac rehab but is to start tomorrow. She has not been wearing her life vest.  Her HR is 84 and BP on soft side. She denies any chest pain, palpitations, lightheadedness or edema. She is getting  in August.    Past Medical History:   has a past medical history of Anemia, Asthma, CHF (congestive heart failure) ([facility] [address] History of 2019 novel coronavirus disease (COVID-19), and Systemic lupus erythematosus ([facility] [address] Surgical History:   has a past surgical history that includes Tubal ligation; Tubal ligation; eye surgery; and  section, low transverse (N/A, ). Social History:   reports that she has never smoked. She has never used smokeless tobacco. She reports current alcohol use of about 3.0 standard drinks of alcohol per week. She reports current drug use. Drug: Marijuana. Family History:   Family History   Problem Relation Age of Onset    Early Death Mother     High Blood Pressure Mother     Substance Abuse Mother     Cancer Father     Substance Abuse Father        Home Medications:  Prior to Admission medications    Medication Sig Start Date End Date Taking?  Authorizing Provider   metoprolol succinate (TOPROL XL) 50 MG extended release tablet Take 1 tablet by mouth daily 21  Yes Stephanie Hunter, APRN - CNS   BIOTIN PO Take 1 tablet by mouth daily   Yes Historical Provider, MD   furosemide (LASIX) 20 MG tablet Take 1 tablet by mouth as needed (wt gain 3lb or more) 6/15/21  Yes ALEXIS Solano CNP   spironolactone (ALDACTONE) 25 MG tablet Take 0.5 tablets by mouth daily 6/15/21  Yes ALEXIS Solano CNP   lisinopril (PRINIVIL;ZESTRIL) 5 MG tablet Take 1 tablet by mouth daily 6/15/21  Yes ALEXIS Solano CNP   LORazepam (ATIVAN) 0.5 MG tablet Take 0.5 mg by mouth as needed. Yes Historical Provider, MD   albuterol sulfate  (90 Base) MCG/ACT inhaler Inhale 2 puffs into the lungs every 6 hours as needed for Shortness of Breath 5/16/21  Yes ALEXIS Henlye CNP   hydroxychloroquine (PLAQUENIL) 200 MG tablet Take 200 mg by mouth 2 times daily   Yes Historical Provider, MD   buPROPion (WELLBUTRIN XL) 300 MG extended release tablet Take 300 mg by mouth every morning   Yes Historical Provider, MD   vitamin B-12 (CYANOCOBALAMIN) 500 MCG tablet Take 500 mcg by mouth daily   Yes Historical Provider, MD   vitamin D (CHOLECALCIFEROL) 25 MCG (1000 UT) TABS tablet Take 1,000 Units by mouth daily   Yes Historical Provider, MD        Allergies:  Shellfish-derived products and Penicillins     Review of Systems:   · Constitutional: there has been no unanticipated weight loss. There's been no change in energy level, sleep pattern, or activity level. · Eyes: No visual changes or diplopia. No scleral icterus. · ENT: No Headaches, hearing loss or vertigo. No mouth sores or sore throat. · Cardiovascular: Reviewed in HPI  · Respiratory: No cough or wheezing, no sputum production. No hematemesis. · Gastrointestinal: No abdominal pain, appetite loss, blood in stools. No change in bowel or bladder habits. · Genitourinary: No dysuria, trouble voiding, or hematuria. · Musculoskeletal:  No gait disturbance, weakness or joint complaints. · Integumentary: No rash or pruritis. · Neurological: No headache, diplopia, change in muscle strength, numbness or tingling.  No change in gait, balance, coordination, mood, affect, memory, mentation, behavior. · Psychiatric: No anxiety, no depression. · Endocrine: No malaise, fatigue or temperature intolerance. No excessive thirst, fluid intake, or urination. No tremor. · Hematologic/Lymphatic: No abnormal bruising or bleeding, blood clots or swollen lymph nodes. · Allergic/Immunologic: No nasal congestion or hives. Physical Examination:    Vitals:    07/29/21 1249 07/29/21 1302   BP: 90/62    Pulse: 100 84   SpO2: 96%    Weight: 170 lb (77.1 kg)    Height: 5' 2\" (1.575 m)         Constitutional and General Appearance: Warm and dry, no apparent distress, normal coloration  HEENT:  Normocephalic, atraumatic  Respiratory:  · Normal excursion and expansion without use of accessory muscles  · Resp Auscultation: Normal breath sounds without dullness  Cardiovascular:  · The apical impulses not displaced  · Heart tones are crisp and normal  · JVP normal cm H2O  · Regular rate and rhythm  · Peripheral pulses are symmetrical and full  · There is no clubbing, cyanosis of the extremities.   · no edema  · Pedal Pulses: 2+ and equal   Abdomen:  · No masses or tenderness  · Liver/Spleen: No Abnormalities Noted  Neurological/Psychiatric:  · Alert and oriented in all spheres  · Moves all extremities well  · Exhibits normal gait balance and coordination  · No abnormalities of mood, affect, memory, mentation, or behavior are noted    Lab Data:    CBC:   Lab Results   Component Value Date    WBC 8.6 05/26/2021    WBC 7.8 05/25/2021    WBC 6.6 05/23/2021    RBC 3.96 05/26/2021    RBC 3.69 05/25/2021    RBC 3.71 05/23/2021    HGB 12.8 05/26/2021    HGB 11.9 05/25/2021    HGB 12.0 05/23/2021    HCT 39.2 05/26/2021    HCT 36.0 05/25/2021    HCT 36.4 05/23/2021    MCV 99.1 05/26/2021    MCV 97.5 05/25/2021    MCV 98.1 05/23/2021    RDW 15.3 05/26/2021    RDW 14.9 05/25/2021    RDW 14.6 05/23/2021     05/26/2021     05/25/2021     05/23/2021     BMP:  Lab Results   Component Value Date     07/12/2021  06/29/2021     06/08/2021    K 4.6 07/12/2021    K 4.5 06/29/2021    K 4.1 06/08/2021    K 3.7 05/23/2021    K 2.6 05/14/2021     07/12/2021     06/29/2021    CL 98 06/08/2021    CO2 26 07/12/2021    CO2 24 06/29/2021    CO2 24 06/08/2021    PHOS 4.1 05/26/2021    PHOS 3.3 05/25/2021    PHOS 3.3 05/16/2021    BUN 11 07/12/2021    BUN 14 06/29/2021    BUN 15 06/08/2021    CREATININE 0.8 07/12/2021    CREATININE 0.9 06/29/2021    CREATININE 0.8 06/08/2021     BNP:   Lab Results   Component Value Date    PROBNP 214 07/12/2021    PROBNP 238 06/29/2021    PROBNP 179 06/08/2021     Cardiac Imaging:  Echo 7/29/2021 pending  Preliminary 40-45%     Echo:  5/24/21:   -The left ventricle is dilated.   -Ejection fraction is visually estimated to be 25%.  -There is mild hypertrophy of the posterior wall.   -There is diffuse global hypokinesis.   -The inferolateral wall appears hypokinetic.   -Grade I diastolic dysfunction with normal LV filling pressures. E/e' =   15.4.   -The left atrium is dilated.   -Trivial aortic regurgitation.   -Moderate to severe mitral regurgitation.   -There is at least moderate tricuspid regurgitation. RVSP = 36 mmHG.   -Mild pulmonic regurgitation present     [facility] 5/25/21:   Anatomy:   LM-normal  LAD-normal  Cx-normal  OM1- normal  RCA-normal  Left dominant  LVEF-25%, 3+ MR        Hemodynamics:  RA- 4/3 mean 0  RV-28/-1, 4  PAWP-9/4 mean 5  PA- 27/10 mean 17     C. O. 6.1   Impression:  1.  Normal coronary arteries. 2.  Severe LV systolic dysfunction. 3.  Moderate to severe mitral regurgitation. 4.  Normal right-sided pressures. Assessment:    1. Chronic systolic congestive heart failure (HCC) on ace, bb and aldosterone antagonist   2. Dilated cardiomyopathy ([address]        Plan:   Patient Instructions   1. Stop coreg and change to toprol 50 mg once a day  2. Continue all current medications  3. Will call with results of echo  4. RTO in 2 months  5.   Call if BP>110 as we may be able to increase lisinopril      NYHA I    I appreciate the opportunity of cooperating in the care of this individual.    ALEXIS Lincoln - CNS, CNS, 7/29/2021, 2:43 PM

## 2021-07-29 NOTE — PATIENT INSTRUCTIONS
1.  Stop coreg and change to toprol 50 mg once a day  2. Continue all current medications  3. Will call with results of echo  4. RTO in 2 months  5.   Call if BP>110 as we may be able to increase lisinopril

## 2021-07-30 ENCOUNTER — HOSPITAL ENCOUNTER (OUTPATIENT)
Dept: CARDIAC REHAB | Age: 46
Setting detail: THERAPIES SERIES
Discharge: HOME | End: 2021-07-30
Payer: COMMERCIAL

## 2021-07-30 PROCEDURE — 93798 PHYS/QHP OP CAR RHAB W/ECG: CPT

## 2021-08-02 ENCOUNTER — HOSPITAL ENCOUNTER (OUTPATIENT)
Dept: CARDIAC REHAB | Age: 46
Setting detail: THERAPIES SERIES
End: 2021-08-02
Payer: COMMERCIAL

## 2021-08-04 ENCOUNTER — HOSPITAL ENCOUNTER (OUTPATIENT)
Dept: CARDIAC REHAB | Age: 46
Setting detail: THERAPIES SERIES
End: 2021-08-04
Payer: COMMERCIAL

## 2021-08-06 ENCOUNTER — TELEPHONE (OUTPATIENT)
Dept: CARDIOLOGY CLINIC | Age: 46
End: 2021-08-06

## 2021-08-06 ENCOUNTER — APPOINTMENT (OUTPATIENT)
Dept: CARDIAC REHAB | Age: 46
End: 2021-08-06
Payer: COMMERCIAL

## 2021-08-06 DIAGNOSIS — I50.22 CHRONIC SYSTOLIC CONGESTIVE HEART FAILURE (HCC): Primary | ICD-10-CM

## 2021-08-06 RX ORDER — LISINOPRIL 10 MG/1
10 TABLET ORAL DAILY
Qty: 30 TABLET | Refills: 0 | Status: SHIPPED | OUTPATIENT
Start: 2021-08-06 | End: 2021-09-29 | Stop reason: SDUPTHER

## 2021-08-06 NOTE — TELEPHONE ENCOUNTER
Increase the lisinopril to 10 mg daily (take 2 of the 5 mg and new script will be for 10 mg)  Check blood work in 2 weeks

## 2021-08-06 NOTE — TELEPHONE ENCOUNTER
Called patient and relayed message below with patient verbal understanding and encouraged patient to call office with any other questions.

## 2021-08-09 ENCOUNTER — APPOINTMENT (OUTPATIENT)
Dept: CARDIAC REHAB | Age: 46
End: 2021-08-09
Payer: COMMERCIAL

## 2021-08-11 ENCOUNTER — APPOINTMENT (OUTPATIENT)
Dept: CARDIAC REHAB | Age: 46
End: 2021-08-11
Payer: COMMERCIAL

## 2021-08-13 ENCOUNTER — APPOINTMENT (OUTPATIENT)
Dept: CARDIAC REHAB | Age: 46
End: 2021-08-13
Payer: COMMERCIAL

## 2021-08-16 ENCOUNTER — HOSPITAL ENCOUNTER (OUTPATIENT)
Dept: CARDIAC REHAB | Age: 46
Setting detail: THERAPIES SERIES
End: 2021-08-16
Payer: COMMERCIAL

## 2021-08-18 ENCOUNTER — HOSPITAL ENCOUNTER (OUTPATIENT)
Dept: CARDIAC REHAB | Age: 46
Setting detail: THERAPIES SERIES
Discharge: HOME | End: 2021-08-18
Payer: COMMERCIAL

## 2021-08-18 PROCEDURE — 93798 PHYS/QHP OP CAR RHAB W/ECG: CPT

## 2021-08-20 ENCOUNTER — HOSPITAL ENCOUNTER (OUTPATIENT)
Dept: CARDIAC REHAB | Age: 46
Setting detail: THERAPIES SERIES
Discharge: HOME | End: 2021-08-20
Payer: COMMERCIAL

## 2021-08-20 PROCEDURE — 93798 PHYS/QHP OP CAR RHAB W/ECG: CPT

## 2021-08-23 ENCOUNTER — HOSPITAL ENCOUNTER (OUTPATIENT)
Dept: CARDIAC REHAB | Age: 46
Setting detail: THERAPIES SERIES
End: 2021-08-23
Payer: COMMERCIAL

## 2021-08-25 ENCOUNTER — APPOINTMENT (OUTPATIENT)
Dept: CARDIAC REHAB | Age: 46
End: 2021-08-25
Payer: COMMERCIAL

## 2021-08-27 ENCOUNTER — HOSPITAL ENCOUNTER (OUTPATIENT)
Dept: CARDIAC REHAB | Age: 46
Setting detail: THERAPIES SERIES
End: 2021-08-27
Payer: COMMERCIAL

## 2021-08-30 ENCOUNTER — HOSPITAL ENCOUNTER (OUTPATIENT)
Dept: CARDIAC REHAB | Age: 46
Setting detail: THERAPIES SERIES
Discharge: HOME | End: 2021-08-30
Payer: COMMERCIAL

## 2021-08-30 PROCEDURE — 93798 PHYS/QHP OP CAR RHAB W/ECG: CPT

## 2021-09-01 ENCOUNTER — HOSPITAL ENCOUNTER (OUTPATIENT)
Dept: CARDIAC REHAB | Age: 46
Setting detail: THERAPIES SERIES
Discharge: HOME | End: 2021-09-01
Payer: COMMERCIAL

## 2021-09-01 PROCEDURE — 93798 PHYS/QHP OP CAR RHAB W/ECG: CPT

## 2021-09-03 ENCOUNTER — HOSPITAL ENCOUNTER (OUTPATIENT)
Dept: CARDIAC REHAB | Age: 46
Setting detail: THERAPIES SERIES
Discharge: HOME | End: 2021-09-03
Payer: COMMERCIAL

## 2021-09-03 PROCEDURE — 93798 PHYS/QHP OP CAR RHAB W/ECG: CPT

## 2021-09-06 ENCOUNTER — APPOINTMENT (OUTPATIENT)
Dept: CARDIAC REHAB | Age: 46
End: 2021-09-06
Payer: COMMERCIAL

## 2021-09-08 ENCOUNTER — HOSPITAL ENCOUNTER (OUTPATIENT)
Dept: CARDIAC REHAB | Age: 46
Setting detail: THERAPIES SERIES
Discharge: HOME | End: 2021-09-08
Payer: COMMERCIAL

## 2021-09-08 PROCEDURE — 93798 PHYS/QHP OP CAR RHAB W/ECG: CPT

## 2021-09-10 ENCOUNTER — HOSPITAL ENCOUNTER (OUTPATIENT)
Dept: CARDIAC REHAB | Age: 46
Setting detail: THERAPIES SERIES
Discharge: HOME | End: 2021-09-10
Payer: COMMERCIAL

## 2021-09-10 PROCEDURE — 93798 PHYS/QHP OP CAR RHAB W/ECG: CPT

## 2021-09-13 ENCOUNTER — HOSPITAL ENCOUNTER (OUTPATIENT)
Dept: CARDIAC REHAB | Age: 46
Setting detail: THERAPIES SERIES
End: 2021-09-13
Payer: COMMERCIAL

## 2021-09-13 ENCOUNTER — TELEPHONE (OUTPATIENT)
Dept: CARDIAC REHAB | Age: 46
End: 2021-09-13

## 2021-09-15 ENCOUNTER — APPOINTMENT (OUTPATIENT)
Dept: CARDIAC REHAB | Age: 46
End: 2021-09-15
Payer: COMMERCIAL

## 2021-09-17 ENCOUNTER — APPOINTMENT (OUTPATIENT)
Dept: CARDIAC REHAB | Age: 46
End: 2021-09-17
Payer: COMMERCIAL

## 2021-09-29 ENCOUNTER — HOSPITAL ENCOUNTER (OUTPATIENT)
Dept: CARDIAC REHAB | Age: 46
Setting detail: THERAPIES SERIES
Discharge: HOME | End: 2021-09-29
Payer: COMMERCIAL

## 2021-09-29 ENCOUNTER — HOSPITAL ENCOUNTER (OUTPATIENT)
Age: 46
Discharge: HOME | End: 2021-09-29
Payer: COMMERCIAL

## 2021-09-29 ENCOUNTER — OFFICE VISIT (OUTPATIENT)
Dept: CARDIOLOGY CLINIC | Age: 46
End: 2021-09-29
Payer: COMMERCIAL

## 2021-09-29 VITALS
WEIGHT: 168 LBS | DIASTOLIC BLOOD PRESSURE: 70 MMHG | OXYGEN SATURATION: 98 % | BODY MASS INDEX: 29.77 KG/M2 | SYSTOLIC BLOOD PRESSURE: 114 MMHG | HEIGHT: 63 IN | HEART RATE: 99 BPM

## 2021-09-29 DIAGNOSIS — I42.0 DILATED CARDIOMYOPATHY (HCC): ICD-10-CM

## 2021-09-29 DIAGNOSIS — I50.22 CHRONIC SYSTOLIC CONGESTIVE HEART FAILURE (HCC): Primary | ICD-10-CM

## 2021-09-29 DIAGNOSIS — I50.22 CHRONIC SYSTOLIC CONGESTIVE HEART FAILURE (HCC): ICD-10-CM

## 2021-09-29 LAB
ANION GAP SERPL CALCULATED.3IONS-SCNC: 13 MMOL/L (ref 3–16)
BUN BLDV-MCNC: 12 MG/DL (ref 7–20)
CALCIUM SERPL-MCNC: 9.1 MG/DL (ref 8.3–10.6)
CHLORIDE BLD-SCNC: 103 MMOL/L (ref 99–110)
CO2: 23 MMOL/L (ref 21–32)
CREAT SERPL-MCNC: 0.8 MG/DL (ref 0.6–1.1)
GFR AFRICAN AMERICAN: >60
GFR NON-AFRICAN AMERICAN: >60
GLUCOSE BLD-MCNC: 86 MG/DL (ref 70–99)
POTASSIUM SERPL-SCNC: 4.3 MMOL/L (ref 3.5–5.1)
PRO-BNP: 121 PG/ML (ref 0–124)
SODIUM BLD-SCNC: 139 MMOL/L (ref 136–145)

## 2021-09-29 PROCEDURE — 80048 BASIC METABOLIC PNL TOTAL CA: CPT

## 2021-09-29 PROCEDURE — 99214 OFFICE O/P EST MOD 30 MIN: CPT | Performed by: CLINICAL NURSE SPECIALIST

## 2021-09-29 PROCEDURE — 36415 COLL VENOUS BLD VENIPUNCTURE: CPT

## 2021-09-29 PROCEDURE — 93798 PHYS/QHP OP CAR RHAB W/ECG: CPT

## 2021-09-29 PROCEDURE — 1036F TOBACCO NON-USER: CPT | Performed by: CLINICAL NURSE SPECIALIST

## 2021-09-29 PROCEDURE — G8417 CALC BMI ABV UP PARAM F/U: HCPCS | Performed by: CLINICAL NURSE SPECIALIST

## 2021-09-29 PROCEDURE — 83880 ASSAY OF NATRIURETIC PEPTIDE: CPT

## 2021-09-29 PROCEDURE — G8427 DOCREV CUR MEDS BY ELIG CLIN: HCPCS | Performed by: CLINICAL NURSE SPECIALIST

## 2021-09-29 RX ORDER — LISINOPRIL 10 MG/1
10 TABLET ORAL DAILY
Qty: 90 TABLET | Refills: 1 | Status: SHIPPED | OUTPATIENT
Start: 2021-09-29 | End: 2022-02-24

## 2021-09-29 RX ORDER — ERGOCALCIFEROL 1.25 MG/1
CAPSULE ORAL
COMMUNITY
Start: 2021-09-25

## 2021-09-29 RX ORDER — METOPROLOL SUCCINATE 100 MG/1
50 TABLET, EXTENDED RELEASE ORAL DAILY
Qty: 90 TABLET | Refills: 1 | Status: SHIPPED | OUTPATIENT
Start: 2021-09-29 | End: 2022-02-24

## 2021-09-29 NOTE — PROGRESS NOTES
[facility]  Progress Note    Primary Care Doctor:  Kirti Benavides MD    Chief Complaint   Patient presents with    Congestive Heart Failure        History of Present Illness:  55 y.o. female with history of anemia, asthma, anxiety, SLE (on plaquenil), covid in  and systolic heart failure with LVEF of 25%      I had the pleasure of seeing Dayron Rojas in follow up for sHF with improved LVEF to 45%. She is ambulatory and by her self. She continues with cardiac rehab. She continues to be active and is doing well, no chest pain, palpitations, lightheadedness or edema. Her HR is elevated still. She had labs done earlier this morning. Past Medical History:   has a past medical history of Anemia, Asthma, CHF (congestive heart failure) ([facility] [address] History of 2019 novel coronavirus disease (COVID-19), and Systemic lupus erythematosus ([facility] [address] Surgical History:   has a past surgical history that includes Tubal ligation; Tubal ligation; eye surgery; and  section, low transverse (N/A, ). Social History:   reports that she has never smoked. She has never used smokeless tobacco. She reports current alcohol use of about 3.0 standard drinks of alcohol per week. She reports current drug use. Drug: Marijuana. Family History:   Family History   Problem Relation Age of Onset    Early Death Mother     High Blood Pressure Mother     Substance Abuse Mother     Cancer Father     Substance Abuse Father        Home Medications:  Prior to Admission medications    Medication Sig Start Date End Date Taking?  Authorizing Provider   lisinopril (PRINIVIL;ZESTRIL) 10 MG tablet Take 1 tablet by mouth daily 21  Yes ALEXIS Alvarez   vitamin D (ERGOCALCIFEROL) 1.25 MG (77703 UT) CAPS capsule  21  Yes Historical Provider, MD   metoprolol succinate (TOPROL XL) 100 MG extended release tablet Take 0.5 tablets by mouth daily 21  Yes ALEXIS Ruiz   BIOTIN PO Take 1 tablet by mouth daily   Yes Historical Provider, MD   furosemide (LASIX) 20 MG tablet Take 1 tablet by mouth as needed (wt gain 3lb or more) 6/15/21  Yes ALEXIS Mead CNP   spironolactone (ALDACTONE) 25 MG tablet Take 0.5 tablets by mouth daily 6/15/21  Yes ALEXIS Meda CNP   LORazepam (ATIVAN) 0.5 MG tablet Take 0.5 mg by mouth as needed. Yes Historical Provider, MD   albuterol sulfate  (90 Base) MCG/ACT inhaler Inhale 2 puffs into the lungs every 6 hours as needed for Shortness of Breath 5/16/21  Yes ALEXIS Henley CNP   buPROPion (WELLBUTRIN XL) 300 MG extended release tablet Take 300 mg by mouth every morning   Yes Historical Provider, MD   vitamin B-12 (CYANOCOBALAMIN) 500 MCG tablet Take 500 mcg by mouth daily   Yes Historical Provider, MD        Allergies:  Shellfish-derived products and Penicillins     Review of Systems:   · Constitutional: there has been no unanticipated weight loss. There's been no change in energy level, sleep pattern, or activity level. · Eyes: No visual changes or diplopia. No scleral icterus. · ENT: No Headaches, hearing loss or vertigo. No mouth sores or sore throat. · Cardiovascular: Reviewed in HPI  · Respiratory: No cough or wheezing, no sputum production. No hematemesis. · Gastrointestinal: No abdominal pain, appetite loss, blood in stools. No change in bowel or bladder habits. · Genitourinary: No dysuria, trouble voiding, or hematuria. · Musculoskeletal:  No gait disturbance, weakness or joint complaints. · Integumentary: No rash or pruritis. · Neurological: No headache, diplopia, change in muscle strength, numbness or tingling. No change in gait, balance, coordination, mood, affect, memory, mentation, behavior. · Psychiatric: No anxiety, no depression. · Endocrine: No malaise, fatigue or temperature intolerance. No excessive thirst, fluid intake, or urination. No tremor.   · Hematologic/Lymphatic: No abnormal bruising or bleeding, blood clots or swollen lymph nodes. · Allergic/Immunologic: No nasal congestion or hives. Physical Examination:    Vitals:    09/29/21 1442   BP: (!) 98/50   Pulse: 99   SpO2: 98%   Weight: 168 lb (76.2 kg)   Height: 5' 2.5\" (1.588 m)        Constitutional and General Appearance: Warm and dry, no apparent distress, normal coloration  HEENT:  Normocephalic, atraumatic  Respiratory:  · Normal excursion and expansion without use of accessory muscles  · Resp Auscultation: Normal breath sounds without dullness  Cardiovascular:  · The apical impulses not displaced  · Heart tones are crisp and normal  · JVP normal cm H2O  · Regular rate and rhythm  · Peripheral pulses are symmetrical and full  · There is no clubbing, cyanosis of the extremities.   · no edema  · Pedal Pulses: 2+ and equal   Abdomen:  · No masses or tenderness  · Liver/Spleen: No Abnormalities Noted  Neurological/Psychiatric:  · Alert and oriented in all spheres  · Moves all extremities well  · Exhibits normal gait balance and coordination  · No abnormalities of mood, affect, memory, mentation, or behavior are noted    Lab Data:    CBC:   Lab Results   Component Value Date    WBC 8.6 05/26/2021    WBC 7.8 05/25/2021    WBC 6.6 05/23/2021    RBC 3.96 05/26/2021    RBC 3.69 05/25/2021    RBC 3.71 05/23/2021    HGB 12.8 05/26/2021    HGB 11.9 05/25/2021    HGB 12.0 05/23/2021    HCT 39.2 05/26/2021    HCT 36.0 05/25/2021    HCT 36.4 05/23/2021    MCV 99.1 05/26/2021    MCV 97.5 05/25/2021    MCV 98.1 05/23/2021    RDW 15.3 05/26/2021    RDW 14.9 05/25/2021    RDW 14.6 05/23/2021     05/26/2021     05/25/2021     05/23/2021     BMP:  Lab Results   Component Value Date     07/12/2021     06/29/2021     06/08/2021    K 4.6 07/12/2021    K 4.5 06/29/2021    K 4.1 06/08/2021    K 3.7 05/23/2021    K 2.6 05/14/2021     07/12/2021     06/29/2021    CL 98 06/08/2021    CO2 26 07/12/2021    CO2 24 06/29/2021    CO2 24 06/08/2021    PHOS 4.1 05/26/2021    PHOS 3.3 05/25/2021    PHOS 3.3 05/16/2021    BUN 11 07/12/2021    BUN 14 06/29/2021    BUN 15 06/08/2021    CREATININE 0.8 07/12/2021    CREATININE 0.9 06/29/2021    CREATININE 0.8 06/08/2021     BNP:   Lab Results   Component Value Date    PROBNP 214 07/12/2021    PROBNP 238 06/29/2021    PROBNP 179 06/08/2021     Cardiac Imaging:  Echo 7/29/2021   Summary   Limited study f/u EF   Left ventricular systolic function is mildly reduced with visually estimated   ejection fraction estimated at 45 %. Quantitative EF=49%. No regional wall motion abnormalities are noted. There is mild left ventricular hypertrophy. Left ventricular size is mildly increased. Echo:  5/24/21:   -The left ventricle is dilated.   -Ejection fraction is visually estimated to be 25%.  -There is mild hypertrophy of the posterior wall.   -There is diffuse global hypokinesis.   -The inferolateral wall appears hypokinetic.   -Grade I diastolic dysfunction with normal LV filling pressures. E/e' =   15.4.   -The left atrium is dilated.   -Trivial aortic regurgitation.   -Moderate to severe mitral regurgitation.   -There is at least moderate tricuspid regurgitation. RVSP = 36 mmHG.   -Mild pulmonic regurgitation present     [facility] 5/25/21:   Anatomy:   LM-normal  LAD-normal  Cx-normal  OM1- normal  RCA-normal  Left dominant  LVEF-25%, 3+ MR        Hemodynamics:  RA- 4/3 mean 0  RV-28/-1, 4  PAWP-9/4 mean 5  PA- 27/10 mean 17     C. O. 6.1   Impression:  1.  Normal coronary arteries. 2.  Severe LV systolic dysfunction. 3.  Moderate to severe mitral regurgitation. 4.  Normal right-sided pressures. Assessment:    1. Chronic systolic congestive heart failure (HCC) with improved LVEF to 45%, on ace, bb and aldosterone antagonist   2. Dilated cardiomyopathy ([address]        Plan:   Patient Instructions   1. Increase metoprolol to 100 mg once a day  2. Refilled lisinopril  3.   Continue all

## 2021-10-04 ENCOUNTER — HOSPITAL ENCOUNTER (OUTPATIENT)
Dept: CARDIAC REHAB | Age: 46
Setting detail: THERAPIES SERIES
Discharge: HOME | End: 2021-10-04
Payer: COMMERCIAL

## 2021-10-13 ENCOUNTER — HOSPITAL ENCOUNTER (OUTPATIENT)
Dept: CARDIAC REHAB | Age: 46
Setting detail: THERAPIES SERIES
Discharge: HOME | End: 2021-10-13
Payer: COMMERCIAL

## 2021-10-15 ENCOUNTER — HOSPITAL ENCOUNTER (OUTPATIENT)
Dept: CARDIAC REHAB | Age: 46
Setting detail: THERAPIES SERIES
Discharge: HOME | End: 2021-10-15
Payer: COMMERCIAL

## 2021-10-15 PROCEDURE — 93798 PHYS/QHP OP CAR RHAB W/ECG: CPT

## 2021-10-18 ENCOUNTER — HOSPITAL ENCOUNTER (OUTPATIENT)
Dept: CARDIAC REHAB | Age: 46
Setting detail: THERAPIES SERIES
End: 2021-10-18
Payer: COMMERCIAL

## 2021-11-09 RX ORDER — METOPROLOL SUCCINATE 50 MG/1
TABLET, EXTENDED RELEASE ORAL
Qty: 90 TABLET | Refills: 0 | Status: SHIPPED | OUTPATIENT
Start: 2021-11-09 | End: 2022-01-22

## 2022-01-22 RX ORDER — METOPROLOL SUCCINATE 50 MG/1
TABLET, EXTENDED RELEASE ORAL
Qty: 90 TABLET | Refills: 0 | Status: SHIPPED | OUTPATIENT
Start: 2022-01-22 | End: 2022-02-24 | Stop reason: SDUPTHER

## 2022-02-11 RX ORDER — SPIRONOLACTONE 25 MG/1
TABLET ORAL
Qty: 30 TABLET | Refills: 0 | Status: SHIPPED | OUTPATIENT
Start: 2022-02-11 | End: 2022-06-02 | Stop reason: SDUPTHER

## 2022-02-24 ENCOUNTER — OFFICE VISIT (OUTPATIENT)
Dept: CARDIOLOGY CLINIC | Age: 47
End: 2022-02-24
Payer: COMMERCIAL

## 2022-02-24 VITALS
HEIGHT: 63 IN | OXYGEN SATURATION: 99 % | BODY MASS INDEX: 28.76 KG/M2 | HEART RATE: 77 BPM | DIASTOLIC BLOOD PRESSURE: 80 MMHG | WEIGHT: 162.3 LBS | SYSTOLIC BLOOD PRESSURE: 140 MMHG

## 2022-02-24 DIAGNOSIS — I10 ESSENTIAL (PRIMARY) HYPERTENSION: ICD-10-CM

## 2022-02-24 DIAGNOSIS — I50.22 CHRONIC SYSTOLIC CONGESTIVE HEART FAILURE (HCC): Primary | ICD-10-CM

## 2022-02-24 DIAGNOSIS — I42.0 DILATED CARDIOMYOPATHY (HCC): ICD-10-CM

## 2022-02-24 PROCEDURE — G8427 DOCREV CUR MEDS BY ELIG CLIN: HCPCS | Performed by: CLINICAL NURSE SPECIALIST

## 2022-02-24 PROCEDURE — 1036F TOBACCO NON-USER: CPT | Performed by: CLINICAL NURSE SPECIALIST

## 2022-02-24 PROCEDURE — G8417 CALC BMI ABV UP PARAM F/U: HCPCS | Performed by: CLINICAL NURSE SPECIALIST

## 2022-02-24 PROCEDURE — G8484 FLU IMMUNIZE NO ADMIN: HCPCS | Performed by: CLINICAL NURSE SPECIALIST

## 2022-02-24 PROCEDURE — 99214 OFFICE O/P EST MOD 30 MIN: CPT | Performed by: CLINICAL NURSE SPECIALIST

## 2022-02-24 RX ORDER — LISINOPRIL 20 MG/1
20 TABLET ORAL DAILY
Qty: 90 TABLET | Refills: 1 | Status: SHIPPED | OUTPATIENT
Start: 2022-02-24 | End: 2022-04-15 | Stop reason: SDUPTHER

## 2022-02-24 RX ORDER — METOPROLOL SUCCINATE 100 MG/1
100 TABLET, EXTENDED RELEASE ORAL DAILY
Qty: 90 TABLET | Refills: 2 | Status: SHIPPED | OUTPATIENT
Start: 2022-02-24 | End: 2022-04-15 | Stop reason: SDUPTHER

## 2022-02-24 NOTE — PROGRESS NOTES
[facility]  Progress Note    Primary Care Doctor:  Ren Metzger MD    Chief Complaint   Patient presents with    Congestive Heart Failure     C/o meds make her have nausea,and diarrhea    Cardiomyopathy        History of Present Illness:  55 y.o. female with history of anemia, asthma, anxiety, SLE (on plaquenil), covid in march,  and systolic heart failure with LVEF of 25%      I had the pleasure of seeing Woody Plummer in follow up for sHF with improved LVEF to 45%. She is ambulatory and by her self. She stopped cardiac rehab as she has adopted her 3year old grandson. Her BP is elevated today. She states she is taking all her medications. Her weight is down 6 pounds. Her lupus MD manages her vitamin d. She denies any chest pain, palpitations, lightheadedness or edema. She needs to follow up with GI. Past Medical History:   has a past medical history of Anemia, Asthma, CHF (congestive heart failure) ([facility] [address] History of 2019 novel coronavirus disease (COVID-19), and Systemic lupus erythematosus ([facility] [address] Surgical History:   has a past surgical history that includes Tubal ligation; Tubal ligation; eye surgery; and  section, low transverse (N/A, ). Social History:   reports that she has never smoked. She has never used smokeless tobacco. She reports current alcohol use of about 3.0 standard drinks of alcohol per week. She reports current drug use. Drug: Marijuana Racheal Heys). Family History:   Family History   Problem Relation Age of Onset    Early Death Mother     High Blood Pressure Mother     Substance Abuse Mother     Cancer Father     Substance Abuse Father        Home Medications:  Prior to Admission medications    Medication Sig Start Date End Date Taking?  Authorizing Provider   metoprolol succinate (TOPROL XL) 100 MG extended release tablet Take 1 tablet by mouth daily 22  Yes Stephanie Ahn APRN - CNS   lisinopril (PRINIVIL;ZESTRIL) 20 MG tablet Take 1 tablet by mouth daily 2/24/22  Yes ALEXIS Sprague   vitamin D (ERGOCALCIFEROL) 1.25 MG (92198 UT) CAPS capsule  9/25/21  Yes Historical Provider, MD   BIOTIN PO Take 1 tablet by mouth daily   Yes Historical Provider, MD   furosemide (LASIX) 20 MG tablet Take 1 tablet by mouth as needed (wt gain 3lb or more) 6/15/21  Yes ALEXIS Dugan CNP   albuterol sulfate  (90 Base) MCG/ACT inhaler Inhale 2 puffs into the lungs every 6 hours as needed for Shortness of Breath 5/16/21  Yes ALEXIS Henley CNP   buPROPion (WELLBUTRIN XL) 300 MG extended release tablet Take 300 mg by mouth every morning   Yes Historical Provider, MD   vitamin B-12 (CYANOCOBALAMIN) 500 MCG tablet Take 500 mcg by mouth daily   Yes Historical Provider, MD   spironolactone (ALDACTONE) 25 MG tablet Take 1/2 (one-half) tablet by mouth once daily 2/11/22   ALEXIS Nagel   LORazepam (ATIVAN) 0.5 MG tablet Take 0.5 mg by mouth as needed. Historical Provider, MD        Allergies:  Shellfish-derived products and Penicillins     Review of Systems:   · Constitutional: there has been no unanticipated weight loss. There's been no change in energy level, sleep pattern, or activity level. · Eyes: No visual changes or diplopia. No scleral icterus. · ENT: No Headaches, hearing loss or vertigo. No mouth sores or sore throat. · Cardiovascular: Reviewed in HPI  · Respiratory: No cough or wheezing, no sputum production. No hematemesis. · Gastrointestinal: No abdominal pain, appetite loss, blood in stools. No change in bowel or bladder habits. · Genitourinary: No dysuria, trouble voiding, or hematuria. · Musculoskeletal:  No gait disturbance, weakness or joint complaints. · Integumentary: No rash or pruritis. · Neurological: No headache, diplopia, change in muscle strength, numbness or tingling. No change in gait, balance, coordination, mood, affect, memory, mentation, behavior.   · Psychiatric: No anxiety, no depression. · Endocrine: No malaise, fatigue or temperature intolerance. No excessive thirst, fluid intake, or urination. No tremor. · Hematologic/Lymphatic: No abnormal bruising or bleeding, blood clots or swollen lymph nodes. · Allergic/Immunologic: No nasal congestion or hives. Physical Examination:    Vitals:    02/24/22 1235   BP: (!) 140/80   Site: Right Upper Arm   Position: Sitting   Cuff Size: Medium Adult   Pulse: 77   SpO2: 99%   Weight: 162 lb 4.8 oz (73.6 kg)   Height: 5' 2.5\" (1.588 m)        Constitutional and General Appearance: Warm and dry, no apparent distress, normal coloration  HEENT:  Normocephalic, atraumatic  Respiratory:  · Normal excursion and expansion without use of accessory muscles  · Resp Auscultation: Normal breath sounds without dullness  Cardiovascular:  · The apical impulses not displaced  · Heart tones are crisp and normal  · JVP normal cm H2O  · Regular rate and rhythm  · Peripheral pulses are symmetrical and full  · There is no clubbing, cyanosis of the extremities.   · no edema  · Pedal Pulses: 2+ and equal   Abdomen:  · No masses or tenderness  · Liver/Spleen: No Abnormalities Noted  Neurological/Psychiatric:  · Alert and oriented in all spheres  · Moves all extremities well  · Exhibits normal gait balance and coordination  · No abnormalities of mood, affect, memory, mentation, or behavior are noted    Lab Data:    CBC:   Lab Results   Component Value Date    WBC 8.6 05/26/2021    WBC 7.8 05/25/2021    WBC 6.6 05/23/2021    RBC 3.96 05/26/2021    RBC 3.69 05/25/2021    RBC 3.71 05/23/2021    HGB 12.8 05/26/2021    HGB 11.9 05/25/2021    HGB 12.0 05/23/2021    HCT 39.2 05/26/2021    HCT 36.0 05/25/2021    HCT 36.4 05/23/2021    MCV 99.1 05/26/2021    MCV 97.5 05/25/2021    MCV 98.1 05/23/2021    RDW 15.3 05/26/2021    RDW 14.9 05/25/2021    RDW 14.6 05/23/2021     05/26/2021     05/25/2021     05/23/2021     BMP:  Lab Results   Component Value Date     09/29/2021     07/12/2021     06/29/2021    K 4.3 09/29/2021    K 4.6 07/12/2021    K 4.5 06/29/2021    K 3.7 05/23/2021    K 2.6 05/14/2021     09/29/2021     07/12/2021     06/29/2021    CO2 23 09/29/2021    CO2 26 07/12/2021    CO2 24 06/29/2021    PHOS 4.1 05/26/2021    PHOS 3.3 05/25/2021    PHOS 3.3 05/16/2021    BUN 12 09/29/2021    BUN 11 07/12/2021    BUN 14 06/29/2021    CREATININE 0.8 09/29/2021    CREATININE 0.8 07/12/2021    CREATININE 0.9 06/29/2021     BNP:   Lab Results   Component Value Date    PROBNP 121 09/29/2021    PROBNP 214 07/12/2021    PROBNP 238 06/29/2021     Cardiac Imaging:  Echo 7/29/2021   Summary   Limited study f/u EF   Left ventricular systolic function is mildly reduced with visually estimated   ejection fraction estimated at 45 %. Quantitative EF=49%. No regional wall motion abnormalities are noted. There is mild left ventricular hypertrophy. Left ventricular size is mildly increased. Echo:  5/24/21:   -The left ventricle is dilated.   -Ejection fraction is visually estimated to be 25%.  -There is mild hypertrophy of the posterior wall.   -There is diffuse global hypokinesis.   -The inferolateral wall appears hypokinetic.   -Grade I diastolic dysfunction with normal LV filling pressures. E/e' =   15.4.   -The left atrium is dilated.   -Trivial aortic regurgitation.   -Moderate to severe mitral regurgitation.   -There is at least moderate tricuspid regurgitation. RVSP = 36 mmHG.   -Mild pulmonic regurgitation present     [facility] 5/25/21:   Anatomy:   LM-normal  LAD-normal  Cx-normal  OM1- normal  RCA-normal  Left dominant  LVEF-25%, 3+ MR        Hemodynamics:  RA- 4/3 mean 0  RV-28/-1, 4  PAWP-9/4 mean 5  PA- 27/10 mean 17     C. O. 6.1   Impression:  1.  Normal coronary arteries. 2.  Severe LV systolic dysfunction. 3.  Moderate to severe mitral regurgitation. 4.  Normal right-sided pressures. Assessment:    1.  Chronic systolic congestive heart failure ([facility] [address] with improved LVEF to 45%, on ace, bb and aldosterone antagonist   2. Dilated cardiomyopathy ([facility] [address]    3. Essential hypertension    Plan:   Patient Instructions   1. Increase lisinopril 20 mg once a day  2. Refilled lisinopril and metoprolol  3. Continue all current medications  4. Check blood work in 1 month  5.   RTO in 6 months      NYHA I    I appreciate the opportunity of cooperating in the care of this individual.    Soo Partida, APRN - CNS, CNS, 2/24/2022, 1:06 PM

## 2022-02-24 NOTE — PATIENT INSTRUCTIONS
1.  Increase lisinopril 20 mg once a day  2. Refilled lisinopril and metoprolol  3. Continue all current medications  4. Check blood work in 1 month  5.   RTO in 6 months

## 2022-04-15 ENCOUNTER — TELEPHONE (OUTPATIENT)
Dept: CARDIOLOGY CLINIC | Age: 47
End: 2022-04-15

## 2022-04-15 RX ORDER — METOPROLOL SUCCINATE 100 MG/1
100 TABLET, EXTENDED RELEASE ORAL DAILY
Qty: 90 TABLET | Refills: 0 | Status: SHIPPED | OUTPATIENT
Start: 2022-04-15

## 2022-04-15 RX ORDER — METOPROLOL SUCCINATE 50 MG/1
TABLET, EXTENDED RELEASE ORAL
Qty: 90 TABLET | Refills: 0 | Status: SHIPPED | OUTPATIENT
Start: 2022-04-15 | End: 2022-04-15 | Stop reason: SDUPTHER

## 2022-04-15 RX ORDER — METOPROLOL SUCCINATE 50 MG/1
TABLET, EXTENDED RELEASE ORAL
Qty: 90 TABLET | Refills: 0 | OUTPATIENT
Start: 2022-04-15

## 2022-04-15 RX ORDER — METOPROLOL SUCCINATE 50 MG/1
50 TABLET, EXTENDED RELEASE ORAL DAILY
Qty: 90 TABLET | Refills: 0 | Status: SHIPPED | OUTPATIENT
Start: 2022-04-15

## 2022-04-15 RX ORDER — LISINOPRIL 10 MG/1
TABLET ORAL
Qty: 90 TABLET | Refills: 0 | OUTPATIENT
Start: 2022-04-15

## 2022-04-15 RX ORDER — LISINOPRIL 20 MG/1
20 TABLET ORAL DAILY
Qty: 30 TABLET | Refills: 0 | Status: SHIPPED | OUTPATIENT
Start: 2022-04-15 | End: 2022-06-02 | Stop reason: SDUPTHER

## 2022-04-15 NOTE — TELEPHONE ENCOUNTER
Last OV: 2-24-22 NPRG  Last labs: 9-29-21 BMP  Appt scheduled : 8-25-22 NPRG  Last refill:2-24-22 NPRG

## 2022-04-15 NOTE — TELEPHONE ENCOUNTER
LMOM for patient to return call. Rx for Lisinopril has been sent to the pharmacy. Per NPRG pt only given 30 day supply untilpt has labs done as instructed per OV

## 2022-04-15 NOTE — TELEPHONE ENCOUNTER
Medication Refill    Medication needing refilled: Lisinopril     Dosage of the medication: 20 mg    How are you taking this medication (QD, BID, TID, QID, PRN): 1 tablet daily    30 or 90 day supply called in: 30     When will you run out of your medication: Today    Which Pharmacy are we sending the medication to?: [facility] [address], [place], [address] [facility] [phone]

## 2022-06-02 ENCOUNTER — HOSPITAL ENCOUNTER (OUTPATIENT)
Age: 47
Discharge: HOME | End: 2022-06-02
Payer: COMMERCIAL

## 2022-06-02 DIAGNOSIS — I50.22 CHRONIC SYSTOLIC CONGESTIVE HEART FAILURE (HCC): ICD-10-CM

## 2022-06-02 LAB
ANION GAP SERPL CALCULATED.3IONS-SCNC: 12 MMOL/L (ref 3–16)
BUN BLDV-MCNC: 15 MG/DL (ref 7–20)
CALCIUM SERPL-MCNC: 8.7 MG/DL (ref 8.3–10.6)
CHLORIDE BLD-SCNC: 103 MMOL/L (ref 99–110)
CO2: 23 MMOL/L (ref 21–32)
CREAT SERPL-MCNC: 0.8 MG/DL (ref 0.6–1.1)
GFR AFRICAN AMERICAN: >60
GFR NON-AFRICAN AMERICAN: >60
GLUCOSE BLD-MCNC: 91 MG/DL (ref 70–99)
POTASSIUM SERPL-SCNC: 3.7 MMOL/L (ref 3.5–5.1)
SODIUM BLD-SCNC: 138 MMOL/L (ref 136–145)

## 2022-06-02 PROCEDURE — 80048 BASIC METABOLIC PNL TOTAL CA: CPT

## 2022-06-02 PROCEDURE — 36415 COLL VENOUS BLD VENIPUNCTURE: CPT

## 2022-06-02 RX ORDER — SPIRONOLACTONE 25 MG/1
TABLET ORAL
Qty: 30 TABLET | Refills: 1 | Status: SHIPPED | OUTPATIENT
Start: 2022-06-02 | End: 2022-10-06 | Stop reason: SDUPTHER

## 2022-06-02 RX ORDER — LISINOPRIL 20 MG/1
20 TABLET ORAL DAILY
Qty: 30 TABLET | Refills: 1 | Status: SHIPPED | OUTPATIENT
Start: 2022-06-02 | End: 2022-10-06 | Stop reason: SDUPTHER

## 2022-10-06 ENCOUNTER — OFFICE VISIT (OUTPATIENT)
Dept: CARDIOLOGY CLINIC | Age: 47
End: 2022-10-06
Payer: COMMERCIAL

## 2022-10-06 VITALS
DIASTOLIC BLOOD PRESSURE: 90 MMHG | BODY MASS INDEX: 27.64 KG/M2 | HEIGHT: 63 IN | SYSTOLIC BLOOD PRESSURE: 122 MMHG | HEART RATE: 95 BPM | WEIGHT: 156 LBS | OXYGEN SATURATION: 99 %

## 2022-10-06 DIAGNOSIS — I50.22 CHRONIC SYSTOLIC CONGESTIVE HEART FAILURE (HCC): Primary | ICD-10-CM

## 2022-10-06 DIAGNOSIS — I42.0 DILATED CARDIOMYOPATHY (HCC): ICD-10-CM

## 2022-10-06 DIAGNOSIS — I10 ESSENTIAL (PRIMARY) HYPERTENSION: ICD-10-CM

## 2022-10-06 PROCEDURE — G8427 DOCREV CUR MEDS BY ELIG CLIN: HCPCS | Performed by: CLINICAL NURSE SPECIALIST

## 2022-10-06 PROCEDURE — G8484 FLU IMMUNIZE NO ADMIN: HCPCS | Performed by: CLINICAL NURSE SPECIALIST

## 2022-10-06 PROCEDURE — 1036F TOBACCO NON-USER: CPT | Performed by: CLINICAL NURSE SPECIALIST

## 2022-10-06 PROCEDURE — G8417 CALC BMI ABV UP PARAM F/U: HCPCS | Performed by: CLINICAL NURSE SPECIALIST

## 2022-10-06 PROCEDURE — 99214 OFFICE O/P EST MOD 30 MIN: CPT | Performed by: CLINICAL NURSE SPECIALIST

## 2022-10-06 RX ORDER — SPIRONOLACTONE 25 MG/1
TABLET ORAL
Qty: 45 TABLET | Refills: 1 | Status: SHIPPED | OUTPATIENT
Start: 2022-10-06

## 2022-10-06 RX ORDER — LISINOPRIL 20 MG/1
20 TABLET ORAL DAILY
Qty: 90 TABLET | Refills: 1 | Status: SHIPPED | OUTPATIENT
Start: 2022-10-06

## 2022-10-06 NOTE — PROGRESS NOTES
[address]  Progress Note    Primary Care Doctor:  Deann Fermin MD    Chief Complaint   Patient presents with    6 Month Follow-Up    Congestive Heart Failure        History of Present Illness:  52 y.o. female with history of anemia, asthma, anxiety, SLE (on plaquenil), covid in  and systolic heart failure with LVEF of 25%      I had the pleasure of seeing Mj James in follow up for sHF with improved LVEF to 45%. She is ambulatory and by her self. Her weight has gone from [phone]. Her HR is elevated and she states she has not taken any of her medications yet today. She is not sure what dose of metoprolol she is taking but states it is once a day. She denies any chest pain, palpitations, lightheadedness or edema. She has not had any new labs but needs to see her rheumatologist.    Past Medical History:   has a past medical history of Anemia, Asthma, CHF (congestive heart failure) ([facility] [address] History of 2019 novel coronavirus disease (COVID-19), and Systemic lupus erythematosus ([facility] [address] Surgical History:   has a past surgical history that includes Tubal ligation; Tubal ligation; eye surgery; and  section, low transverse (N/A, ). Social History:   reports that she has never smoked. She has never used smokeless tobacco. She reports current alcohol use of about 3.0 standard drinks per week. She reports current drug use. Drug: Marijuana Ai Forte). Family History:   Family History   Problem Relation Age of Onset    Early Death Mother     High Blood Pressure Mother     Substance Abuse Mother     Cancer Father     Substance Abuse Father        Home Medications:  Prior to Admission medications    Medication Sig Start Date End Date Taking?  Authorizing Provider   spironolactone (ALDACTONE) 25 MG tablet Take 1/2 (one-half) tablet by mouth once daily 10/6/22  Yes Stephanie Dunham APRN - CNS   lisinopril (PRINIVIL;ZESTRIL) 20 MG tablet Take 1 tablet by mouth daily 10/6/22  Yes ALEXIS Leos - CNS   metoprolol succinate (TOPROL XL) 100 MG extended release tablet Take 1 tablet by mouth daily 4/15/22  Yes ALEXIS Batres   metoprolol succinate (TOPROL XL) 50 MG extended release tablet Take 1 tablet by mouth daily 4/15/22  Yes ALEXIS Batres - CNS   vitamin D (ERGOCALCIFEROL) 1.25 MG (30762 UT) CAPS capsule  9/25/21  Yes Historical Provider, MD   furosemide (LASIX) 20 MG tablet Take 1 tablet by mouth as needed (wt gain 3lb or more) 6/15/21  Yes ALEXIS Crandall - CNP   LORazepam (ATIVAN) 0.5 MG tablet Take 0.5 mg by mouth as needed. Yes Historical Provider, MD   albuterol sulfate  (90 Base) MCG/ACT inhaler Inhale 2 puffs into the lungs every 6 hours as needed for Shortness of Breath 5/16/21  Yes ALEXIS Henley CNP   buPROPion (WELLBUTRIN XL) 300 MG extended release tablet Take 300 mg by mouth every morning   Yes Historical Provider, MD   vitamin B-12 (CYANOCOBALAMIN) 500 MCG tablet Take 500 mcg by mouth daily   Yes Historical Provider, MD   BIOTIN PO Take 1 tablet by mouth daily  Patient not taking: Reported on 10/6/2022    Historical Provider, MD        Allergies:  Shellfish-derived products and Penicillins     Review of Systems:   Constitutional: there has been no unanticipated weight loss. There's been no change in energy level, sleep pattern, or activity level. Eyes: No visual changes or diplopia. No scleral icterus. ENT: No Headaches, hearing loss or vertigo. No mouth sores or sore throat. Cardiovascular: Reviewed in HPI  Respiratory: No cough or wheezing, no sputum production. No hematemesis. Gastrointestinal: No abdominal pain, appetite loss, blood in stools. No change in bowel or bladder habits. Genitourinary: No dysuria, trouble voiding, or hematuria. Musculoskeletal:  No gait disturbance, weakness or joint complaints. Integumentary: No rash or pruritis.   Neurological: No headache, diplopia, change in muscle 97.5 05/25/2021 04:32 AM    MCV 98.1 05/23/2021 09:10 PM    RDW 15.3 05/26/2021 06:02 AM    RDW 14.9 05/25/2021 04:32 AM    RDW 14.6 05/23/2021 09:10 PM     05/26/2021 06:02 AM     05/25/2021 04:32 AM     05/23/2021 09:10 PM     BMP:  Lab Results   Component Value Date/Time     06/02/2022 04:05 PM     09/29/2021 09:47 AM     07/12/2021 09:14 AM    K 3.7 06/02/2022 04:05 PM    K 4.3 09/29/2021 09:47 AM    K 4.6 07/12/2021 09:14 AM    K 3.7 05/23/2021 09:10 PM    K 2.6 05/14/2021 08:44 PM     06/02/2022 04:05 PM     09/29/2021 09:47 AM     07/12/2021 09:14 AM    CO2 23 06/02/2022 04:05 PM    CO2 23 09/29/2021 09:47 AM    CO2 26 07/12/2021 09:14 AM    PHOS 4.1 05/26/2021 06:02 AM    PHOS 3.3 05/25/2021 04:32 AM    PHOS 3.3 05/16/2021 05:01 AM    BUN 15 06/02/2022 04:05 PM    BUN 12 09/29/2021 09:47 AM    BUN 11 07/12/2021 09:14 AM    CREATININE 0.8 06/02/2022 04:05 PM    CREATININE 0.8 09/29/2021 09:47 AM    CREATININE 0.8 07/12/2021 09:14 AM     BNP:   Lab Results   Component Value Date/Time    PROBNP 121 09/29/2021 09:47 AM    PROBNP 214 07/12/2021 09:14 AM    PROBNP 238 06/29/2021 09:03 AM     Cardiac Imaging:  Echo 7/29/2021   Summary   Limited study f/u EF   Left ventricular systolic function is mildly reduced with visually estimated   ejection fraction estimated at 45 %. Quantitative EF=49%. No regional wall motion abnormalities are noted. There is mild left ventricular hypertrophy. Left ventricular size is mildly increased. Echo:  5/24/21:   -The left ventricle is dilated. -Ejection fraction is visually estimated to be 25%. -There is mild hypertrophy of the posterior wall.   -There is diffuse global hypokinesis. -The inferolateral wall appears hypokinetic.   -Grade I diastolic dysfunction with normal LV filling pressures. E/e' =   15.4.   -The left atrium is dilated.    -Trivial aortic regurgitation.   -Moderate to severe mitral regurgitation.   -There is at least moderate tricuspid regurgitation. RVSP = 36 mmHG. -Mild pulmonic regurgitation present     [facility] 5/25/21:   Anatomy:   LM-normal  LAD-normal  Cx-normal  OM1- normal  RCA-normal  Left dominant  LVEF-25%, 3+ MR        Hemodynamics:  RA- 4/3 mean 0  RV-28/-1, 4  PAWP-9/4 mean 5  PA- 27/10 mean 17     C. O. 6.1   Impression:  1. Normal coronary arteries. 2.  Severe LV systolic dysfunction. 3.  Moderate to severe mitral regurgitation. 4.  Normal right-sided pressures. Assessment:    1. Chronic systolic congestive heart failure (HCC) with improved LVEF to 45%, on ace, bb and aldosterone antagonist   2. Dilated cardiomyopathy ([address]    3.       Essential hypertension    Plan:   Patient Instructions   Check blood work  Continue all current medications  Refilled lisinopril and aldactone  Let me know what dose of metoprolol you are taking  RTO 6-9 months    NYHA I    I appreciate the opportunity of cooperating in the care of this individual.    ALEXIS Tang - CNS, CNS, 10/6/2022, 2:35 PM

## 2022-10-06 NOTE — PATIENT INSTRUCTIONS
Check blood work  Continue all current medications  Refilled lisinopril and aldactone  Let me know what dose of metoprolol you are taking  RTO 6-9 months

## 2023-01-17 ENCOUNTER — TELEPHONE (OUTPATIENT)
Dept: CARDIOLOGY CLINIC | Age: 48
End: 2023-01-17

## 2023-01-17 DIAGNOSIS — I50.22 CHRONIC SYSTOLIC CONGESTIVE HEART FAILURE (HCC): Primary | ICD-10-CM

## 2023-01-17 NOTE — TELEPHONE ENCOUNTER
I am assuming this is for cardiac rehab  I will place it but she will most likely have to pay for the rehab  thanks

## 2023-01-17 NOTE — TELEPHONE ENCOUNTER
Pt called to request that [address] [place] [address] send a referral so she can attend Rehab again. Per Pt she is wanting the office to call her to inform her that the referral has been sent. Please advise.   Thank you

## 2023-02-05 ENCOUNTER — APPOINTMENT (OUTPATIENT)
Dept: GENERAL RADIOLOGY | Age: 48
End: 2023-02-05
Payer: COMMERCIAL

## 2023-02-05 ENCOUNTER — HOSPITAL ENCOUNTER (EMERGENCY)
Age: 48
Discharge: HOME | End: 2023-02-05
Attending: EMERGENCY MEDICINE
Payer: COMMERCIAL

## 2023-02-05 VITALS
OXYGEN SATURATION: 100 % | SYSTOLIC BLOOD PRESSURE: 161 MMHG | TEMPERATURE: 97.9 F | WEIGHT: 150 LBS | HEIGHT: 62 IN | HEART RATE: 92 BPM | RESPIRATION RATE: 18 BRPM | BODY MASS INDEX: 27.6 KG/M2 | DIASTOLIC BLOOD PRESSURE: 104 MMHG

## 2023-02-05 DIAGNOSIS — M23.92 LIGAMENTOUS LAXITY OF KNEE, LEFT: ICD-10-CM

## 2023-02-05 DIAGNOSIS — M25.562 ACUTE PAIN OF LEFT KNEE: Primary | ICD-10-CM

## 2023-02-05 LAB
ANION GAP SERPL CALCULATED.3IONS-SCNC: 10 MMOL/L (ref 3–16)
BASOPHILS ABSOLUTE: 0.1 K/UL (ref 0–0.2)
BASOPHILS RELATIVE PERCENT: 1.2 %
BUN BLDV-MCNC: 8 MG/DL (ref 7–20)
CALCIUM SERPL-MCNC: 9.9 MG/DL (ref 8.3–10.6)
CHLORIDE BLD-SCNC: 102 MMOL/L (ref 99–110)
CO2: 28 MMOL/L (ref 21–32)
CREAT SERPL-MCNC: 0.7 MG/DL (ref 0.6–1.1)
EOSINOPHILS ABSOLUTE: 0.1 K/UL (ref 0–0.6)
EOSINOPHILS RELATIVE PERCENT: 2.3 %
GFR SERPL CREATININE-BSD FRML MDRD: >60 ML/MIN/{1.73_M2}
GLUCOSE BLD-MCNC: 90 MG/DL (ref 70–99)
HCT VFR BLD CALC: 39 % (ref 36–48)
HEMOGLOBIN: 13.3 G/DL (ref 12–16)
LYMPHOCYTES ABSOLUTE: 0.9 K/UL (ref 1–5.1)
LYMPHOCYTES RELATIVE PERCENT: 19.7 %
MCH RBC QN AUTO: 35.3 PG (ref 26–34)
MCHC RBC AUTO-ENTMCNC: 34.1 G/DL (ref 31–36)
MCV RBC AUTO: 103.5 FL (ref 80–100)
MONOCYTES ABSOLUTE: 0.3 K/UL (ref 0–1.3)
MONOCYTES RELATIVE PERCENT: 7.5 %
NEUTROPHILS ABSOLUTE: 3.1 K/UL (ref 1.7–7.7)
NEUTROPHILS RELATIVE PERCENT: 69.3 %
PDW BLD-RTO: 13.7 % (ref 12.4–15.4)
PLATELET # BLD: 222 K/UL (ref 135–450)
PMV BLD AUTO: 8.6 FL (ref 5–10.5)
POTASSIUM SERPL-SCNC: 3.4 MMOL/L (ref 3.5–5.1)
RBC # BLD: 3.77 M/UL (ref 4–5.2)
SEDIMENTATION RATE, ERYTHROCYTE: 11 MM/HR (ref 0–20)
SODIUM BLD-SCNC: 140 MMOL/L (ref 136–145)
WBC # BLD: 4.5 K/UL (ref 4–11)

## 2023-02-05 PROCEDURE — 36415 COLL VENOUS BLD VENIPUNCTURE: CPT

## 2023-02-05 PROCEDURE — 96374 THER/PROPH/DIAG INJ IV PUSH: CPT

## 2023-02-05 PROCEDURE — 80048 BASIC METABOLIC PNL TOTAL CA: CPT

## 2023-02-05 PROCEDURE — 73560 X-RAY EXAM OF KNEE 1 OR 2: CPT

## 2023-02-05 PROCEDURE — 6360000002 HC RX W HCPCS: Performed by: EMERGENCY MEDICINE

## 2023-02-05 PROCEDURE — 85652 RBC SED RATE AUTOMATED: CPT

## 2023-02-05 PROCEDURE — 99284 EMERGENCY DEPT VISIT MOD MDM: CPT

## 2023-02-05 PROCEDURE — 85025 COMPLETE CBC W/AUTO DIFF WBC: CPT

## 2023-02-05 RX ORDER — KETOROLAC TROMETHAMINE 10 MG/1
10 TABLET, FILM COATED ORAL EVERY 6 HOURS PRN
Qty: 10 TABLET | Refills: 0 | Status: SHIPPED | OUTPATIENT
Start: 2023-02-05

## 2023-02-05 RX ORDER — KETOROLAC TROMETHAMINE 30 MG/ML
15 INJECTION, SOLUTION INTRAMUSCULAR; INTRAVENOUS ONCE
Status: COMPLETED | OUTPATIENT
Start: 2023-02-05 | End: 2023-02-05

## 2023-02-05 RX ADMIN — KETOROLAC TROMETHAMINE 15 MG: 30 INJECTION, SOLUTION INTRAMUSCULAR; INTRAVENOUS at 06:56

## 2023-02-05 ASSESSMENT — PAIN SCALES - GENERAL
PAINLEVEL_OUTOF10: 7
PAINLEVEL_OUTOF10: 3
PAINLEVEL_OUTOF10: 7

## 2023-02-05 ASSESSMENT — PAIN DESCRIPTION - LOCATION
LOCATION: LEG
LOCATION: LEG

## 2023-02-05 ASSESSMENT — LIFESTYLE VARIABLES: HOW OFTEN DO YOU HAVE A DRINK CONTAINING ALCOHOL: 2-4 TIMES A MONTH

## 2023-02-05 ASSESSMENT — PAIN - FUNCTIONAL ASSESSMENT: PAIN_FUNCTIONAL_ASSESSMENT: 0-10

## 2023-02-05 ASSESSMENT — PAIN DESCRIPTION - FREQUENCY: FREQUENCY: CONTINUOUS

## 2023-02-05 ASSESSMENT — PAIN DESCRIPTION - DESCRIPTORS
DESCRIPTORS: THROBBING
DESCRIPTORS: THROBBING;PENETRATING

## 2023-02-05 NOTE — ED NOTES
Patient ambulated independently to bathroom. Tolerated well. Patient returned to room, resting in bed with call light in reach.       [facility]  02/05/23 [id number]

## 2023-02-05 NOTE — ED PROVIDER NOTES
[address] [facility] PROVIDER NOTE    Patient Identification  Pt Name: Narinder Maher  MRN: [id number]  [address] 1975  Date of evaluation: 2023  Provider: Sylvia Banks MD  PCP: Idania Loo MD    HPI  Narinder Maher is a 52 y.o. female who presents to the ED for severe pain in the left knee which radiates through the entire leg. She has a history of arthritis in many joints and a bone spur in the left knee, but this pain is worse than usual. The pain started last night after spending time on her feet for four hours last evening. Her feet were swollen and her knee was painful after, which is typical. She took two Tylenol PM to sleep. She woke with such severe pain, she \"couldn't handle it. \" She describes it as a throbbing pain. Her typical pain is less severe and \"sore\" or aching. She denies associated fever or chills. No other complaints, modifying factors or associated symptoms. Nursing notes reviewed.    Allergies: Shellfish-derived products and Penicillins  Past medical history:   Past Medical History:   Diagnosis Date    Anemia     Asthma     no attack since teenager no meds    CHF (congestive heart failure) ([facility] [address]     History of 2019 novel coronavirus disease (COVID-19)     Systemic lupus erythematosus ([facility] [address]      Past surgical history:   Past Surgical History:   Procedure Laterality Date     SECTION, LOW TRANSVERSE N/A 2013    EYE SURGERY      TUBAL LIGATION      reversal     TUBAL LIGATION      TL reversed in      Home medications:   Previous Medications    ALBUTEROL SULFATE  (90 BASE) MCG/ACT INHALER    Inhale 2 puffs into the lungs every 6 hours as needed for Shortness of Breath    BIOTIN PO    Take 1 tablet by mouth daily    BUPROPION (WELLBUTRIN XL) 300 MG EXTENDED RELEASE TABLET    Take 300 mg by mouth every morning    FUROSEMIDE (LASIX) 20 MG TABLET    Take 1 tablet by mouth as needed (wt gain 3lb or more)    LISINOPRIL (PRINIVIL;ZESTRIL) 20 MG TABLET    Take 1 tablet by mouth daily    LORAZEPAM (ATIVAN) 0.5 MG TABLET    Take 0.5 mg by mouth as needed. METOPROLOL SUCCINATE (TOPROL XL) 100 MG EXTENDED RELEASE TABLET    Take 1 tablet by mouth daily    METOPROLOL SUCCINATE (TOPROL XL) 50 MG EXTENDED RELEASE TABLET    Take 1 tablet by mouth daily    SPIRONOLACTONE (ALDACTONE) 25 MG TABLET    Take 1/2 (one-half) tablet by mouth once daily    VITAMIN B-12 (CYANOCOBALAMIN) 500 MCG TABLET    Take 500 mcg by mouth daily    VITAMIN D (ERGOCALCIFEROL) 1.25 MG (20339 UT) CAPS CAPSULE         Social history:  reports that she has never smoked. She has never used smokeless tobacco. She reports current alcohol use of about 3.0 standard drinks per week. She reports current drug use. Drug: Marijuana Delpha Reaper). Family history:    Family History   Problem Relation Age of Onset    Early Death Mother     High Blood Pressure Mother     Substance Abuse Mother     Cancer Father     Substance Abuse Father        REVIEW OF SYSTEMS  8 systems reviewed, pertinent positives per HPI otherwise noted to be negative    PHYSICAL EXAM  BP (!) 161/104   Pulse 92   Temp 97.9 °F (36.6 °C) (Oral)   Resp 18   Ht 5' 2\" (1.575 m)   Wt 150 lb (68 kg)   SpO2 100%   BMI 27.44 kg/m²   GENERAL APPEARANCE: Awake and alert. Cooperative. No acute distress. HEAD: Normocephalic. Atraumatic. EYES: Anicteric sclera. EOM grossly intact. ENT: Mucous membranes are moist.   LAD: ***  CV: Brisk capillary refill. ***  LUNGS: Breathing is unlabored. Speaking comfortably in full sentences. ABDOMEN: ***  EXTREMITIES: MAEE. No acute deformities. BACK: ***  SKIN: Warm and dry. NEUROLOGICAL: Alert and oriented. Normal coordination. PROCEDURES    RADIOLOGY  XR KNEE LEFT (1-2 VIEWS)   Preliminary Result   No acute abnormality of the left knee. No significant arthritic change is   evident.                 LABS  Labs Reviewed   CBC WITH AUTO DIFFERENTIAL - Abnormal; Notable for the following components:       Result Value    RBC 3.77 (*)     .5 (*)     MCH 35.3 (*)     Lymphocytes Absolute 0.9 (*)     All other components within normal limits   BASIC METABOLIC PANEL - Abnormal; Notable for the following components:    Potassium 3.4 (*)     All other components within normal limits   SEDIMENTATION RATE       ED COURSE/MDM  Patient seen and evaluated. I discussed *** with {PATIENT/PARENT/SIBLING/FAMILY:[id number]}. I do feel patient can be safely discharged to home. Recommend follow up with *** for ***. Reasons to RT ED discussed, including ***. Patient expresses understanding and is in agreement with plan. I have considered the following diagnoses in my evaluation of the patient: ***    Patient Referrals:  Merian Hodgkins, MD  [address]  [address] [facility],[address]  [address]  [phone]    In 1 week  Orthopedics, for re-evaluation    Discharge Medications:  New Prescriptions    KETOROLAC (TORADOL) 10 MG TABLET    Take 1 tablet by mouth every 6 hours as needed for Pain       FINAL IMPRESSION  1. Acute pain of left knee    2. Ligamentous laxity of knee, left        Blood pressure (!) 161/104, pulse 92, temperature 97.9 °F (36.6 °C), temperature source Oral, resp. rate 18, height 5' 2\" (1.575 m), weight 150 lb (68 kg), SpO2 100 %, not currently breastfeeding. DISPOSITION  Patient was discharged to home in good condition. This chart was generated using the [address] [place] [address] [address] [employer] system. I created this record but it may contain dictation errors given the limitations of this technology. medications    Details   ketorolac (TORADOL) 10 MG tablet Take 1 tablet by mouth every 6 hours as needed for Pain, Disp-10 tablet, R-0Normal             FINAL IMPRESSION  1. Acute pain of left knee    2. Ligamentous laxity of knee, left        Blood pressure (!) 161/104, pulse 92, temperature 97.9 °F (36.6 °C), temperature source Oral, resp. rate 18, height 5' 2\" (1.575 m), weight 150 lb (68 kg), SpO2 100 %, not currently breastfeeding. DISPOSITION  Patient was discharged to home in good condition. This chart was generated using the [address] [place] [address] dictation system. I created this record but it may contain dictation errors given the limitations of this technology.         Panda Pierce MD  03/03/23 9184

## 2023-02-07 ENCOUNTER — OFFICE VISIT (OUTPATIENT)
Dept: ORTHOPEDIC SURGERY | Age: 48
End: 2023-02-07
Payer: COMMERCIAL

## 2023-02-07 VITALS — HEIGHT: 62 IN | BODY MASS INDEX: 27.6 KG/M2 | WEIGHT: 150 LBS

## 2023-02-07 DIAGNOSIS — M25.562 LEFT KNEE PAIN, UNSPECIFIED CHRONICITY: Primary | ICD-10-CM

## 2023-02-07 PROCEDURE — G8427 DOCREV CUR MEDS BY ELIG CLIN: HCPCS | Performed by: ORTHOPAEDIC SURGERY

## 2023-02-07 PROCEDURE — 99204 OFFICE O/P NEW MOD 45 MIN: CPT | Performed by: ORTHOPAEDIC SURGERY

## 2023-02-07 PROCEDURE — G8484 FLU IMMUNIZE NO ADMIN: HCPCS | Performed by: ORTHOPAEDIC SURGERY

## 2023-02-07 PROCEDURE — 1036F TOBACCO NON-USER: CPT | Performed by: ORTHOPAEDIC SURGERY

## 2023-02-07 PROCEDURE — G8417 CALC BMI ABV UP PARAM F/U: HCPCS | Performed by: ORTHOPAEDIC SURGERY

## 2023-02-07 SDOH — HEALTH STABILITY: PHYSICAL HEALTH: ON AVERAGE, HOW MANY MINUTES DO YOU ENGAGE IN EXERCISE AT THIS LEVEL?: 0 MIN

## 2023-02-07 SDOH — HEALTH STABILITY: PHYSICAL HEALTH: ON AVERAGE, HOW MANY DAYS PER WEEK DO YOU ENGAGE IN MODERATE TO STRENUOUS EXERCISE (LIKE A BRISK WALK)?: 0 DAYS

## 2023-02-13 NOTE — PROGRESS NOTES
2023     Reason for visit:  Left knee pain    History of Present Illness: The patient is a 70-year-old female who presents for evaluation of her left knee. She reports that she injured herself on 2023. At the time she was cooking cleaning when she believes that she twisted her knee. She has had persistent pain ever since then. She reports medial base pain is made worse with rotational movements and any sort of weightbearing. She experiences some swelling as well as occasional catching and locking of the knee. Medical History:  Past Medical History:   Diagnosis Date    Anemia     Asthma     no attack since teenager no meds    CHF (congestive heart failure) ([facility] [address]     History of 2019 novel coronavirus disease (COVID-19)     Systemic lupus erythematosus ([facility] [address]       Past Surgical History:   Procedure Laterality Date     SECTION, LOW TRANSVERSE N/A     EYE SURGERY      TUBAL LIGATION      reversal     TUBAL LIGATION      TL reversed in       Family History   Problem Relation Age of Onset    Early Death Mother     High Blood Pressure Mother     Substance Abuse Mother     Cancer Father     Substance Abuse Father       Social History     Socioeconomic History    Marital status:      Spouse name: Not on file    Number of children: 5    Years of education: 15    Highest education level: Not on file   Occupational History    Not on file   Tobacco Use    Smoking status: Never    Smokeless tobacco: Never   Vaping Use    Vaping Use: Never used   Substance and Sexual Activity    Alcohol use:  Yes     Alcohol/week: 3.0 standard drinks     Types: 3 Glasses of wine per week    Drug use: Yes     Types: Marijuana Deland Oswald)    Sexual activity: Yes     Partners: Male   Other Topics Concern    Not on file   Social History Narrative    Not on file     Social Determinants of Health     Financial Resource Strain: Not on file   Food Insecurity: Not on file   Transportation Needs: Not on file   Physical Activity: Inactive    Days of Exercise per Week: 0 days    Minutes of Exercise per Session: 0 min   Stress: Not on file   Social Connections: Not on file   Intimate Partner Violence: Not At Risk    Fear of Current or Ex-Partner: No    Emotionally Abused: No    Physically Abused: No    Sexually Abused: No   Housing Stability: Not on file      Current Outpatient Medications on File Prior to Visit   Medication Sig Dispense Refill    ketorolac (TORADOL) 10 MG tablet Take 1 tablet by mouth every 6 hours as needed for Pain 10 tablet 0    spironolactone (ALDACTONE) 25 MG tablet Take 1/2 (one-half) tablet by mouth once daily 45 tablet 1    lisinopril (PRINIVIL;ZESTRIL) 20 MG tablet Take 1 tablet by mouth daily 90 tablet 1    metoprolol succinate (TOPROL XL) 100 MG extended release tablet Take 1 tablet by mouth daily 90 tablet 0    metoprolol succinate (TOPROL XL) 50 MG extended release tablet Take 1 tablet by mouth daily 90 tablet 0    vitamin D (ERGOCALCIFEROL) 1.25 MG (97222 UT) CAPS capsule       BIOTIN PO Take 1 tablet by mouth daily (Patient not taking: Reported on 10/6/2022)      furosemide (LASIX) 20 MG tablet Take 1 tablet by mouth as needed (wt gain 3lb or more) 60 tablet 2    LORazepam (ATIVAN) 0.5 MG tablet Take 0.5 mg by mouth as needed. albuterol sulfate  (90 Base) MCG/ACT inhaler Inhale 2 puffs into the lungs every 6 hours as needed for Shortness of Breath 1 Inhaler 0    buPROPion (WELLBUTRIN XL) 300 MG extended release tablet Take 300 mg by mouth every morning      vitamin B-12 (CYANOCOBALAMIN) 500 MCG tablet Take 500 mcg by mouth daily       No current facility-administered medications on file prior to visit. Allergies   Allergen Reactions    Shellfish-Derived Products Hives    Penicillins Hives        Review of Systems:  Constitutional: Patient is adequately groomed with no evidence of malnutrition  Mental Status: The patient is oriented to time, place and person.   The patient's mood and affect are appropriate. Lymphatic: The lymphatic examination bilaterally reveals all areas to be without enlargement or induration. Vascular: Examination reveals no swelling or calf tenderness. Peripheral pulses are palpable and 2+. Neurological: The patient has good coordination. There is no weakness or sensory deficit. Skin:  Head/Neck: inspection reveals no rashes, ulcerations or lesions. Trunk: inspection reveals no rashes, ulcerations or lesions. Objective:  Ht 5' 2\" (1.575 m)   Wt 150 lb (68 kg)   BMI 27.44 kg/m²      Physical Exam:  The patient is well-appearing and in no apparent distress  Examination of the left knee   There is a small effusion, no gross deformity or skin changes  Range of motion reveals 0 to 125  Neg lachman, negative posterior drawer, no pain or laxity with varus or valgus stress at 0 degrees and 30 degrees of flexion  + joint line tenderness  5 out of 5 strength throughout distal muscle groups  Sensation is intact to light touch throughout all distributions  There is no calf swelling or tenderness  Palpable DP pulse, brisk cap refill, 2+ symmetric reflexes     Imagin view x-rays of the left knee were obtained the office today on 2023 and reviewed. There is no fracture. No other abnormality. Assessment:  Left knee pain following traumatic injury. Concern for meniscus tear versus other intra-articular process    Plan:  I discussed with patient the differential diagnosis. At this point I would recommend an MRI given the acuity of the injury combined with the mechanical symptoms and exam.  She is in agreement with the plan. Following the MRI she will return to review the results in the office. Greater than 45 minutes were spent with this encounter. Time spent included evaluating the patient's chart prior to arrival.  Evaluating the patient in the office including history, physical examination, imaging reviewing, and counseling on next steps.   Lastly, time was spent discussing orders with my staff as well as providing documentation in the chart. Rosa Elena Patel MD            Orthopaedic Surgery Sports Medicine and [address] and [address] [facility]            Team Physician [facility] ([place]      Disclaimer: This note was dictated with voice recognition software. Though review and correction are routine, we apologize for any errors.

## 2023-02-22 ENCOUNTER — TELEPHONE (OUTPATIENT)
Dept: ORTHOPEDIC SURGERY | Age: 48
End: 2023-02-22

## 2023-03-09 ENCOUNTER — HOSPITAL ENCOUNTER (OUTPATIENT)
Dept: MRI IMAGING | Age: 48
Discharge: HOME | End: 2023-03-09
Payer: COMMERCIAL

## 2023-03-09 DIAGNOSIS — M25.562 LEFT KNEE PAIN, UNSPECIFIED CHRONICITY: ICD-10-CM

## 2023-03-09 PROCEDURE — 73721 MRI JNT OF LWR EXTRE W/O DYE: CPT

## 2023-03-10 ENCOUNTER — PATIENT MESSAGE (OUTPATIENT)
Dept: ORTHOPEDIC SURGERY | Age: 48
End: 2023-03-10

## 2023-03-10 NOTE — TELEPHONE ENCOUNTER
From: Sohan Moore  To: Dr. Bradly Rodriguez: 3/10/2023 9:41 AM EST  Subject: Question regarding MRI Knee Left Without Contrast    Im writing after receiving results of MRI. Can you please translate what the results mean? I would also like to know how the results will be treated going forward. Please advise. Thanks.

## 2023-03-10 NOTE — TELEPHONE ENCOUNTER
I left a VM letting the pt know that per Dr. Masha Murdock last note he would like her to come in to the office to go over results.  I provided the number to the call center

## 2023-03-16 ENCOUNTER — OFFICE VISIT (OUTPATIENT)
Dept: ORTHOPEDIC SURGERY | Age: 48
End: 2023-03-16
Payer: COMMERCIAL

## 2023-03-16 VITALS — WEIGHT: 150 LBS | BODY MASS INDEX: 27.6 KG/M2 | HEIGHT: 62 IN

## 2023-03-16 DIAGNOSIS — M25.562 LEFT KNEE PAIN, UNSPECIFIED CHRONICITY: Primary | ICD-10-CM

## 2023-03-16 PROCEDURE — 99214 OFFICE O/P EST MOD 30 MIN: CPT | Performed by: ORTHOPAEDIC SURGERY

## 2023-03-16 PROCEDURE — 1036F TOBACCO NON-USER: CPT | Performed by: ORTHOPAEDIC SURGERY

## 2023-03-16 PROCEDURE — G8484 FLU IMMUNIZE NO ADMIN: HCPCS | Performed by: ORTHOPAEDIC SURGERY

## 2023-03-16 PROCEDURE — 20610 DRAIN/INJ JOINT/BURSA W/O US: CPT | Performed by: ORTHOPAEDIC SURGERY

## 2023-03-16 PROCEDURE — G8427 DOCREV CUR MEDS BY ELIG CLIN: HCPCS | Performed by: ORTHOPAEDIC SURGERY

## 2023-03-16 PROCEDURE — G8417 CALC BMI ABV UP PARAM F/U: HCPCS | Performed by: ORTHOPAEDIC SURGERY

## 2023-03-16 RX ORDER — BUPIVACAINE HYDROCHLORIDE 5 MG/ML
4 INJECTION, SOLUTION PERINEURAL ONCE
Status: COMPLETED | OUTPATIENT
Start: 2023-03-16 | End: 2023-03-16

## 2023-03-16 RX ORDER — METHYLPREDNISOLONE ACETATE 40 MG/ML
40 INJECTION, SUSPENSION INTRA-ARTICULAR; INTRALESIONAL; INTRAMUSCULAR; SOFT TISSUE ONCE
Status: COMPLETED | OUTPATIENT
Start: 2023-03-16 | End: 2023-03-16

## 2023-03-16 RX ADMIN — BUPIVACAINE HYDROCHLORIDE 20 MG: 5 INJECTION, SOLUTION PERINEURAL at 11:56

## 2023-03-16 RX ADMIN — METHYLPREDNISOLONE ACETATE 40 MG: 40 INJECTION, SUSPENSION INTRA-ARTICULAR; INTRALESIONAL; INTRAMUSCULAR; SOFT TISSUE at 11:55

## 2023-03-16 NOTE — PROGRESS NOTES
3/16/2023     Reason for visit:  Left knee pain    History of Present Illness: The patient is a 49-year-old female who presents for evaluation of her left knee. She reports that she injured herself on 2023. At the time she was cooking cleaning when she believes that she twisted her knee. She has had persistent pain ever since then. She reports medial base pain is made worse with rotational movements and any sort of weightbearing. She experiences some swelling as well as occasional catching and locking of the knee. At the last visit we elected to proceed with an MRI. She is here to review the results and discuss treatment options. Objective:  Ht 5' 2\" (1.575 m)   Wt 150 lb (68 kg)   BMI 27.44 kg/m²      Physical Exam:  The patient is well-appearing and in no apparent distress  Examination of the left knee   There is a small effusion, no gross deformity or skin changes  Range of motion reveals 0 to 125  Neg lachman, negative posterior drawer, no pain or laxity with varus or valgus stress at 0 degrees and 30 degrees of flexion  + joint line tenderness  5 out of 5 strength throughout distal muscle groups  Sensation is intact to light touch throughout all distributions  There is no calf swelling or tenderness  Palpable DP pulse, brisk cap refill, 2+ symmetric reflexes     Imagin view x-rays of the left knee were obtained the office today on 2023 and reviewed. There is no fracture. No other abnormality. MRI of the left knee was reviewed. Chondral defect of the trochlea is noted. Irregularity of the ACL is visualized which likely represents a previous injury versus mucinous degeneration      Assessment:  Left knee pain following traumatic injury. MRI reveals femoral trochlear chondral defect as well as irregularity of the ACL which may represent previous injury versus isolated mucinous degeneration    Plan:  I discussed with the patient the diagnosis and treatment options.   We

## 2023-04-04 ENCOUNTER — HOSPITAL ENCOUNTER (OUTPATIENT)
Dept: PHYSICAL THERAPY | Age: 48
Setting detail: THERAPIES SERIES
Discharge: HOME | End: 2023-04-04

## 2023-04-04 NOTE — FLOWSHEET NOTE
[address] [facility]     Physical Therapy  Cancellation/No-show Note  Patient Name:  Sammi Randhawa  :  1975   Date:  2023  Cancelled visits to date: 1  No-shows to date: 0    Patient status for today's appointment patient:  [x]  Cancelled: : eval  []  Rescheduled appointment  []  No-show     Reason given by patient:  []  Patient ill  []  Conflicting appointment  []  No transportation    []  Conflict with work  []  No reason given  [x]  Other:     Comments:  grandchild is sick    Phone call information:   []  Phone call made today to patient at _ time at number provided:      []  Patient answered, conversation as follows:    []  Patient did not answer, message left as follows:  []  Phone call not made today  [x]  Phone call not needed - pt contacted us to cancel and provided reason for cancellation.      Electronically signed by:  Ana Cristina Partida, PT, DPT

## 2023-05-24 RX ORDER — SPIRONOLACTONE 25 MG/1
TABLET ORAL
Qty: 45 TABLET | Refills: 0 | Status: SHIPPED | OUTPATIENT
Start: 2023-05-24

## 2023-06-22 ENCOUNTER — OFFICE VISIT (OUTPATIENT)
Dept: CARDIOLOGY CLINIC | Age: 48
End: 2023-06-22
Payer: COMMERCIAL

## 2023-06-22 ENCOUNTER — HOSPITAL ENCOUNTER (OUTPATIENT)
Age: 48
Discharge: HOME | End: 2023-06-22
Payer: COMMERCIAL

## 2023-06-22 VITALS
HEIGHT: 62 IN | OXYGEN SATURATION: 99 % | DIASTOLIC BLOOD PRESSURE: 80 MMHG | BODY MASS INDEX: 27.79 KG/M2 | SYSTOLIC BLOOD PRESSURE: 130 MMHG | WEIGHT: 151 LBS | HEART RATE: 68 BPM

## 2023-06-22 DIAGNOSIS — E55.9 VITAMIN D DEFICIENCY: ICD-10-CM

## 2023-06-22 DIAGNOSIS — I50.22 CHRONIC SYSTOLIC CONGESTIVE HEART FAILURE (HCC): Primary | ICD-10-CM

## 2023-06-22 DIAGNOSIS — I42.0 DILATED CARDIOMYOPATHY (HCC): ICD-10-CM

## 2023-06-22 DIAGNOSIS — I10 ESSENTIAL (PRIMARY) HYPERTENSION: ICD-10-CM

## 2023-06-22 DIAGNOSIS — I50.22 CHRONIC SYSTOLIC CONGESTIVE HEART FAILURE (HCC): ICD-10-CM

## 2023-06-22 LAB
25(OH)D3 SERPL-MCNC: 18.8 NG/ML
ANION GAP SERPL CALCULATED.3IONS-SCNC: 12 MMOL/L (ref 3–16)
BUN SERPL-MCNC: 10 MG/DL (ref 7–20)
CALCIUM SERPL-MCNC: 9.3 MG/DL (ref 8.3–10.6)
CHLORIDE SERPL-SCNC: 101 MMOL/L (ref 99–110)
CO2 SERPL-SCNC: 27 MMOL/L (ref 21–32)
CREAT SERPL-MCNC: 0.8 MG/DL (ref 0.6–1.1)
GFR SERPLBLD CREATININE-BSD FMLA CKD-EPI: >60 ML/MIN/{1.73_M2}
GLUCOSE SERPL-MCNC: 90 MG/DL (ref 70–99)
NT-PROBNP SERPL-MCNC: 118 PG/ML (ref 0–124)
POTASSIUM SERPL-SCNC: 4.2 MMOL/L (ref 3.5–5.1)
SODIUM SERPL-SCNC: 140 MMOL/L (ref 136–145)

## 2023-06-22 PROCEDURE — 83880 ASSAY OF NATRIURETIC PEPTIDE: CPT

## 2023-06-22 PROCEDURE — 99214 OFFICE O/P EST MOD 30 MIN: CPT | Performed by: CLINICAL NURSE SPECIALIST

## 2023-06-22 PROCEDURE — 1036F TOBACCO NON-USER: CPT | Performed by: CLINICAL NURSE SPECIALIST

## 2023-06-22 PROCEDURE — 36415 COLL VENOUS BLD VENIPUNCTURE: CPT

## 2023-06-22 PROCEDURE — G8427 DOCREV CUR MEDS BY ELIG CLIN: HCPCS | Performed by: CLINICAL NURSE SPECIALIST

## 2023-06-22 PROCEDURE — 82306 VITAMIN D 25 HYDROXY: CPT

## 2023-06-22 PROCEDURE — 3075F SYST BP GE 130 - 139MM HG: CPT | Performed by: CLINICAL NURSE SPECIALIST

## 2023-06-22 PROCEDURE — G8417 CALC BMI ABV UP PARAM F/U: HCPCS | Performed by: CLINICAL NURSE SPECIALIST

## 2023-06-22 PROCEDURE — 80048 BASIC METABOLIC PNL TOTAL CA: CPT

## 2023-06-22 PROCEDURE — 3079F DIAST BP 80-89 MM HG: CPT | Performed by: CLINICAL NURSE SPECIALIST

## 2023-06-22 RX ORDER — METOPROLOL SUCCINATE 25 MG/1
25 TABLET, EXTENDED RELEASE ORAL DAILY
Qty: 90 TABLET | Refills: 2 | Status: SHIPPED | OUTPATIENT
Start: 2023-06-22

## 2023-06-22 RX ORDER — LISINOPRIL 20 MG/1
20 TABLET ORAL DAILY
Qty: 90 TABLET | Refills: 1 | Status: SHIPPED | OUTPATIENT
Start: 2023-06-22

## 2023-06-22 RX ORDER — FOLIC ACID 1 MG/1
1 TABLET ORAL DAILY
COMMUNITY

## 2023-06-22 RX ORDER — FUROSEMIDE 20 MG/1
20 TABLET ORAL SEE ADMIN INSTRUCTIONS
Qty: 30 TABLET | Refills: 0 | Status: SHIPPED | OUTPATIENT
Start: 2023-06-22

## 2023-06-22 NOTE — PATIENT INSTRUCTIONS
Continue all current medications and resume metoprolol 25 mg once a day  Schedule an echo  RTO in 6-9 months  Blood work today

## 2023-06-22 NOTE — PROGRESS NOTES
temperature intolerance. No excessive thirst, fluid intake, or urination. No tremor. Hematologic/Lymphatic: No abnormal bruising or bleeding, blood clots or swollen lymph nodes. Allergic/Immunologic: No nasal congestion or hives. Physical Examination:    Vitals:    06/22/23 1028   BP: 130/80   Pulse: 68   SpO2: 99%   Weight: 151 lb (68.5 kg)   Height: 5' 2\" (1.575 m)        Constitutional and General Appearance: Warm and dry, no apparent distress, normal coloration  HEENT:  Normocephalic, atraumatic  Respiratory:  Normal excursion and expansion without use of accessory muscles  Resp Auscultation: Normal breath sounds without dullness  Cardiovascular: The apical impulses not displaced  Heart tones are crisp and normal  JVP normal cm H2O  Regular rate and rhythm  Peripheral pulses are symmetrical and full  There is no clubbing, cyanosis of the extremities.   no edema  Pedal Pulses: 2+ and equal   Abdomen:  No masses or tenderness  Liver/Spleen: No Abnormalities Noted  Neurological/Psychiatric:  Alert and oriented in all spheres  Moves all extremities well  Exhibits normal gait balance and coordination  No abnormalities of mood, affect, memory, mentation, or behavior are noted    Lab Data:    CBC:   Lab Results   Component Value Date/Time    WBC 4.5 02/05/2023 06:41 AM    WBC 8.6 05/26/2021 06:02 AM    WBC 7.8 05/25/2021 04:32 AM    RBC 3.77 02/05/2023 06:41 AM    RBC 3.96 05/26/2021 06:02 AM    RBC 3.69 05/25/2021 04:32 AM    HGB 13.3 02/05/2023 06:41 AM    HGB 12.8 05/26/2021 06:02 AM    HGB 11.9 05/25/2021 04:32 AM    HCT 39.0 02/05/2023 06:41 AM    HCT 39.2 05/26/2021 06:02 AM    HCT 36.0 05/25/2021 04:32 AM    .5 02/05/2023 06:41 AM    MCV 99.1 05/26/2021 06:02 AM    MCV 97.5 05/25/2021 04:32 AM    RDW 13.7 02/05/2023 06:41 AM    RDW 15.3 05/26/2021 06:02 AM    RDW 14.9 05/25/2021 04:32 AM     02/05/2023 06:41 AM     05/26/2021 06:02 AM     05/25/2021 04:32 AM     BMP:  Lab Results

## 2023-06-23 RX ORDER — MELATONIN
2000 DAILY
Qty: 90 TABLET | Refills: 0
Start: 2023-06-23

## 2023-12-03 NOTE — TELEPHONE ENCOUNTER
Last OV: 23    Last labs: 23    Last EK21    Last Refill: 23    Appt scheduled : 23        spironolactone (ALDACTONE) 25 MG tablet 45 tablet 0 2023     Sig: TAKE 1/2 TABLET BY MOUTH DAILY    Sent to pharmacy as: Spironolactone 25 MG Oral Tablet (ALDACTONE)    E-Prescribing Status: Receipt confirmed by pharmacy (2023  8:30 AM EDT)

## 2023-12-04 RX ORDER — SPIRONOLACTONE 25 MG/1
TABLET ORAL
Qty: 45 TABLET | Refills: 0 | Status: SHIPPED | OUTPATIENT
Start: 2023-12-04 | End: 2023-12-18

## 2023-12-14 ENCOUNTER — OFFICE VISIT (OUTPATIENT)
Dept: CARDIOLOGY CLINIC | Age: 48
End: 2023-12-14
Payer: COMMERCIAL

## 2023-12-14 ENCOUNTER — HOSPITAL ENCOUNTER (OUTPATIENT)
Dept: NON INVASIVE DIAGNOSTICS | Age: 48
Discharge: HOME | End: 2023-12-14
Payer: COMMERCIAL

## 2023-12-14 VITALS
SYSTOLIC BLOOD PRESSURE: 126 MMHG | HEIGHT: 62 IN | DIASTOLIC BLOOD PRESSURE: 74 MMHG | BODY MASS INDEX: 28.26 KG/M2 | WEIGHT: 153.6 LBS | HEART RATE: 90 BPM | OXYGEN SATURATION: 99 %

## 2023-12-14 DIAGNOSIS — I10 ESSENTIAL (PRIMARY) HYPERTENSION: ICD-10-CM

## 2023-12-14 DIAGNOSIS — I42.0 DILATED CARDIOMYOPATHY (HCC): ICD-10-CM

## 2023-12-14 DIAGNOSIS — E55.9 VITAMIN D DEFICIENCY: ICD-10-CM

## 2023-12-14 DIAGNOSIS — I50.22 CHRONIC SYSTOLIC CONGESTIVE HEART FAILURE (HCC): Primary | ICD-10-CM

## 2023-12-14 DIAGNOSIS — I50.22 CHRONIC SYSTOLIC CONGESTIVE HEART FAILURE (HCC): ICD-10-CM

## 2023-12-14 PROCEDURE — 3074F SYST BP LT 130 MM HG: CPT | Performed by: CLINICAL NURSE SPECIALIST

## 2023-12-14 PROCEDURE — 99214 OFFICE O/P EST MOD 30 MIN: CPT | Performed by: CLINICAL NURSE SPECIALIST

## 2023-12-14 PROCEDURE — G8484 FLU IMMUNIZE NO ADMIN: HCPCS | Performed by: CLINICAL NURSE SPECIALIST

## 2023-12-14 PROCEDURE — 1036F TOBACCO NON-USER: CPT | Performed by: CLINICAL NURSE SPECIALIST

## 2023-12-14 PROCEDURE — G8417 CALC BMI ABV UP PARAM F/U: HCPCS | Performed by: CLINICAL NURSE SPECIALIST

## 2023-12-14 PROCEDURE — G8427 DOCREV CUR MEDS BY ELIG CLIN: HCPCS | Performed by: CLINICAL NURSE SPECIALIST

## 2023-12-14 PROCEDURE — 3078F DIAST BP <80 MM HG: CPT | Performed by: CLINICAL NURSE SPECIALIST

## 2023-12-14 PROCEDURE — 93306 TTE W/DOPPLER COMPLETE: CPT

## 2023-12-14 NOTE — PATIENT INSTRUCTIONS
Stop lisinopril   On Saturday start entresto 24-26 mg twice a day   Continue all other medications  Check blood work in 2-3 weeks  RTO in 4-6 weeks  No lasix unless weight >155

## 2023-12-14 NOTE — PROGRESS NOTES
motion abnormalities are noted. There is mild left ventricular hypertrophy. Left ventricular size is mildly increased. Echo:  5/24/21:  -The left ventricle is dilated. -Ejection fraction is visually estimated to be 25%. There is mild hypertrophy of the posterior wall.  -There is diffuse global hypokinesis. -The inferolateral wall appears hypokinetic.  -Grade I diastolic dysfunction with normal LV filling pressures. E/e' =  15.4.  -The left atrium is dilated. -Trivial aortic regurgitation.  -Moderate to severe mitral regurgitation.  -There is at least moderate tricuspid regurgitation. RVSP = 36 mmHG. -Mild pulmonic regurgitation present     [facility] 5/25/21:   Anatomy:   LM-normal  LAD-normal  Cx-normal  OM1- normal  RCA-normal  Left dominant  LVEF-25%, 3+ MR        Hemodynamics:  RA- 4/3 mean 0  RV-28/-1, 4  PAWP-9/4 mean 5  PA- 27/10 mean 17     C. O. 6.1   Impression:  1. Normal coronary arteries. 2.  Severe LV systolic dysfunction. 3.  Moderate to severe mitral regurgitation. 4.  Normal right-sided pressures. Assessment:    1. Chronic systolic congestive heart failure (HCC) with LVEF to 40-45%, on ace, bb and aldosterone antagonist   2. Dilated cardiomyopathy ([address])    3. Essential hypertension  4.     Vitamin d deficiency    Plan:   Patient Instructions   Stop lisinopril   On Saturday start entresto 24-26 mg twice a day   Continue all other medications  Check blood work in 2-3 weeks  RTO in 4-6 weeks  No lasix unless weight >155    NYHA I    I appreciate the opportunity of cooperating in the care of this individual.    ALEXIS Vaughn - CNS, CNS, 12/14/2023, 2:03 PM

## 2024-01-02 ENCOUNTER — TELEPHONE (OUTPATIENT)
Dept: CARDIOLOGY CLINIC | Age: 49
End: 2024-01-02

## 2024-01-02 NOTE — TELEPHONE ENCOUNTER
Pt asking for new referral to start cardiac rehab again. Pt states she had to stop rehab for awhile due to custody of grandchild. Please call to advise.

## 2024-01-23 ENCOUNTER — HOSPITAL ENCOUNTER (OUTPATIENT)
Age: 49
Discharge: HOME | End: 2024-01-23
Payer: COMMERCIAL

## 2024-01-23 ENCOUNTER — OFFICE VISIT (OUTPATIENT)
Dept: CARDIOLOGY CLINIC | Age: 49
End: 2024-01-23
Payer: COMMERCIAL

## 2024-01-23 VITALS
SYSTOLIC BLOOD PRESSURE: 116 MMHG | HEART RATE: 92 BPM | WEIGHT: 153 LBS | BODY MASS INDEX: 28.16 KG/M2 | OXYGEN SATURATION: 100 % | HEIGHT: 62 IN | DIASTOLIC BLOOD PRESSURE: 80 MMHG

## 2024-01-23 DIAGNOSIS — I10 ESSENTIAL (PRIMARY) HYPERTENSION: ICD-10-CM

## 2024-01-23 DIAGNOSIS — I50.22 CHRONIC SYSTOLIC CONGESTIVE HEART FAILURE (HCC): ICD-10-CM

## 2024-01-23 DIAGNOSIS — E55.9 VITAMIN D DEFICIENCY: ICD-10-CM

## 2024-01-23 DIAGNOSIS — I50.22 CHRONIC SYSTOLIC CONGESTIVE HEART FAILURE (HCC): Primary | ICD-10-CM

## 2024-01-23 DIAGNOSIS — I42.0 DILATED CARDIOMYOPATHY (HCC): ICD-10-CM

## 2024-01-23 LAB
ANION GAP SERPL CALCULATED.3IONS-SCNC: 16 MMOL/L (ref 3–16)
BUN SERPL-MCNC: 14 MG/DL (ref 7–20)
CALCIUM SERPL-MCNC: 9.1 MG/DL (ref 8.3–10.6)
CHLORIDE SERPL-SCNC: 101 MMOL/L (ref 99–110)
CO2 SERPL-SCNC: 25 MMOL/L (ref 21–32)
CREAT SERPL-MCNC: 0.8 MG/DL (ref 0.6–1.1)
GFR SERPLBLD CREATININE-BSD FMLA CKD-EPI: >60 ML/MIN/{1.73_M2}
GLUCOSE SERPL-MCNC: 79 MG/DL (ref 70–99)
NT-PROBNP SERPL-MCNC: <36 PG/ML (ref 0–124)
POTASSIUM SERPL-SCNC: 4 MMOL/L (ref 3.5–5.1)
SODIUM SERPL-SCNC: 142 MMOL/L (ref 136–145)

## 2024-01-23 PROCEDURE — 3079F DIAST BP 80-89 MM HG: CPT | Performed by: CLINICAL NURSE SPECIALIST

## 2024-01-23 PROCEDURE — 80048 BASIC METABOLIC PNL TOTAL CA: CPT

## 2024-01-23 PROCEDURE — 83880 ASSAY OF NATRIURETIC PEPTIDE: CPT

## 2024-01-23 PROCEDURE — G8427 DOCREV CUR MEDS BY ELIG CLIN: HCPCS | Performed by: CLINICAL NURSE SPECIALIST

## 2024-01-23 PROCEDURE — G8417 CALC BMI ABV UP PARAM F/U: HCPCS | Performed by: CLINICAL NURSE SPECIALIST

## 2024-01-23 PROCEDURE — 3074F SYST BP LT 130 MM HG: CPT | Performed by: CLINICAL NURSE SPECIALIST

## 2024-01-23 PROCEDURE — 1036F TOBACCO NON-USER: CPT | Performed by: CLINICAL NURSE SPECIALIST

## 2024-01-23 PROCEDURE — 36415 COLL VENOUS BLD VENIPUNCTURE: CPT

## 2024-01-23 PROCEDURE — G8484 FLU IMMUNIZE NO ADMIN: HCPCS | Performed by: CLINICAL NURSE SPECIALIST

## 2024-01-23 PROCEDURE — 99214 OFFICE O/P EST MOD 30 MIN: CPT | Performed by: CLINICAL NURSE SPECIALIST

## 2024-01-23 RX ORDER — SPIRONOLACTONE 25 MG/1
12.5 TABLET ORAL DAILY
Qty: 45 TABLET | Refills: 1 | Status: SHIPPED | OUTPATIENT
Start: 2024-01-23

## 2024-01-23 RX ORDER — METOPROLOL SUCCINATE 25 MG/1
25 TABLET, EXTENDED RELEASE ORAL DAILY
Qty: 90 TABLET | Refills: 2 | Status: SHIPPED | OUTPATIENT
Start: 2024-01-23

## 2024-01-23 NOTE — PROGRESS NOTES
systolic dysfunction.  3.  Moderate to severe mitral regurgitation.  4.  Normal right-sided pressures.    Assessment:    1. Chronic systolic congestive heart failure (HCC) with LVEF to 40-45%, on arni, bb and aldosterone antagonist   2. Dilated cardiomyopathy (HCC)    3.    Essential hypertension  4.    Vitamin d deficiency    Plan:   Patient Instructions   Phase III cardiac rehab referral sent  Refills done  Blood work today  RTO in 4 months    NYHA I    I appreciate the opportunity of cooperating in the care of this individual.    SHAE VYAS, APRN - CNS, CNS, 1/23/2024, 11:27 AM

## 2024-06-18 ENCOUNTER — OFFICE VISIT (OUTPATIENT)
Dept: CARDIOLOGY CLINIC | Age: 49
End: 2024-06-18
Payer: COMMERCIAL

## 2024-06-18 ENCOUNTER — HOSPITAL ENCOUNTER (OUTPATIENT)
Age: 49
Discharge: HOME | End: 2024-06-18
Payer: COMMERCIAL

## 2024-06-18 VITALS
WEIGHT: 157 LBS | HEART RATE: 87 BPM | HEIGHT: 62 IN | BODY MASS INDEX: 28.89 KG/M2 | DIASTOLIC BLOOD PRESSURE: 80 MMHG | OXYGEN SATURATION: 98 % | SYSTOLIC BLOOD PRESSURE: 122 MMHG

## 2024-06-18 DIAGNOSIS — I50.22 CHRONIC SYSTOLIC CONGESTIVE HEART FAILURE (HCC): ICD-10-CM

## 2024-06-18 DIAGNOSIS — E55.9 VITAMIN D DEFICIENCY: ICD-10-CM

## 2024-06-18 DIAGNOSIS — I50.22 CHRONIC SYSTOLIC CONGESTIVE HEART FAILURE (HCC): Primary | ICD-10-CM

## 2024-06-18 DIAGNOSIS — I10 ESSENTIAL (PRIMARY) HYPERTENSION: ICD-10-CM

## 2024-06-18 DIAGNOSIS — I42.0 DILATED CARDIOMYOPATHY (HCC): ICD-10-CM

## 2024-06-18 LAB
25(OH)D3 SERPL-MCNC: 13.1 NG/ML
ANION GAP SERPL CALCULATED.3IONS-SCNC: 11 MMOL/L (ref 3–16)
BUN SERPL-MCNC: 13 MG/DL (ref 7–20)
CALCIUM SERPL-MCNC: 9.3 MG/DL (ref 8.3–10.6)
CHLORIDE SERPL-SCNC: 101 MMOL/L (ref 99–110)
CO2 SERPL-SCNC: 27 MMOL/L (ref 21–32)
CREAT SERPL-MCNC: 0.7 MG/DL (ref 0.6–1.1)
GFR SERPLBLD CREATININE-BSD FMLA CKD-EPI: >90 ML/MIN/{1.73_M2}
GLUCOSE SERPL-MCNC: 88 MG/DL (ref 70–99)
NT-PROBNP SERPL-MCNC: <36 PG/ML (ref 0–124)
POTASSIUM SERPL-SCNC: 3.8 MMOL/L (ref 3.5–5.1)
SODIUM SERPL-SCNC: 139 MMOL/L (ref 136–145)

## 2024-06-18 PROCEDURE — G8417 CALC BMI ABV UP PARAM F/U: HCPCS | Performed by: CLINICAL NURSE SPECIALIST

## 2024-06-18 PROCEDURE — 1036F TOBACCO NON-USER: CPT | Performed by: CLINICAL NURSE SPECIALIST

## 2024-06-18 PROCEDURE — 80048 BASIC METABOLIC PNL TOTAL CA: CPT

## 2024-06-18 PROCEDURE — 36415 COLL VENOUS BLD VENIPUNCTURE: CPT

## 2024-06-18 PROCEDURE — 3074F SYST BP LT 130 MM HG: CPT | Performed by: CLINICAL NURSE SPECIALIST

## 2024-06-18 PROCEDURE — G8427 DOCREV CUR MEDS BY ELIG CLIN: HCPCS | Performed by: CLINICAL NURSE SPECIALIST

## 2024-06-18 PROCEDURE — 99214 OFFICE O/P EST MOD 30 MIN: CPT | Performed by: CLINICAL NURSE SPECIALIST

## 2024-06-18 PROCEDURE — 3079F DIAST BP 80-89 MM HG: CPT | Performed by: CLINICAL NURSE SPECIALIST

## 2024-06-18 PROCEDURE — 82306 VITAMIN D 25 HYDROXY: CPT

## 2024-06-18 PROCEDURE — 83880 ASSAY OF NATRIURETIC PEPTIDE: CPT

## 2024-06-18 RX ORDER — MELATONIN
2000 DAILY
Qty: 180 TABLET | Refills: 0 | Status: SHIPPED | OUTPATIENT
Start: 2024-06-18

## 2024-06-18 NOTE — PROGRESS NOTES
[facility]  Progress Note    Primary Care Doctor:  Sánchez Peralta MD    Chief Complaint   Patient presents with    Follow-up    Congestive Heart Failure        History of Present Illness:  48 y.o. female with history of anemia, asthma, anxiety, SLE (on plaquenil), covid in  and systolic heart failure with LVEF of 25%      I had the pleasure of seeing Alpa Boone in follow up for systolic heart failure with improved LVEF 40-45%.  She is ambulatory today and with her son.  Her weight is up from 153->157.  She is taking ibuprofen as she feels she is having an arthritis flare.  She has called her rheumatologist with [facility] but has not heard back from them.  She is taking all her medications and has lasix at home but has not used any.  No chest pain, palpitations, lightheadedness or shortness of breath.  She does have a little ankle edema    Past Medical History:   has a past medical history of Anemia, Asthma, CHF (congestive heart failure) ([facility]), History of 2019 novel coronavirus disease (COVID-19), and Systemic lupus erythematosus (HCC).  Surgical History:   has a past surgical history that includes Tubal ligation; Tubal ligation; eye surgery; and  section, low transverse (N/A, ).   Social History:   reports that she has never smoked. She has never used smokeless tobacco. She reports current alcohol use of about 3.0 standard drinks of alcohol per week. She reports current drug use. Drug: Marijuana (Weed).   Family History:   Family History   Problem Relation Age of Onset    Early Death Mother     High Blood Pressure Mother     Substance Abuse Mother     Cancer Father     Substance Abuse Father        Home Medications:  Prior to Admission medications    Medication Sig Start Date End Date Taking? Authorizing Provider   vitamin D3 (CHOLECALCIFEROL) 25 MCG (1000 UT) TABS tablet Take 2 tablets by mouth daily 24  Yes Stephanie Hunter, APRN - CNS   sacubitril-valsartan (ENTRESTO)

## 2024-06-18 NOTE — PATIENT INSTRUCTIONS
Take furosemide 20 mg if you take an ibuprofen  Call your rheumatologist  Continue all other medications  Cardiac Rehabilitation - about phase III   [facility]  [address] [place]  [address]  [phone]   Blood work today  RTO in 5-6 months

## 2024-11-13 ENCOUNTER — OFFICE VISIT (OUTPATIENT)
Dept: CARDIOLOGY CLINIC | Age: 49
End: 2024-11-13
Payer: COMMERCIAL

## 2024-11-13 VITALS
BODY MASS INDEX: 28.89 KG/M2 | HEART RATE: 81 BPM | WEIGHT: 157 LBS | DIASTOLIC BLOOD PRESSURE: 74 MMHG | HEIGHT: 62 IN | SYSTOLIC BLOOD PRESSURE: 110 MMHG | OXYGEN SATURATION: 100 %

## 2024-11-13 DIAGNOSIS — I10 ESSENTIAL (PRIMARY) HYPERTENSION: ICD-10-CM

## 2024-11-13 DIAGNOSIS — I42.0 DILATED CARDIOMYOPATHY (HCC): ICD-10-CM

## 2024-11-13 DIAGNOSIS — E55.9 VITAMIN D DEFICIENCY: ICD-10-CM

## 2024-11-13 DIAGNOSIS — I50.22 CHRONIC SYSTOLIC CONGESTIVE HEART FAILURE (HCC): Primary | ICD-10-CM

## 2024-11-13 PROCEDURE — 1036F TOBACCO NON-USER: CPT | Performed by: CLINICAL NURSE SPECIALIST

## 2024-11-13 PROCEDURE — G8417 CALC BMI ABV UP PARAM F/U: HCPCS | Performed by: CLINICAL NURSE SPECIALIST

## 2024-11-13 PROCEDURE — 3074F SYST BP LT 130 MM HG: CPT | Performed by: CLINICAL NURSE SPECIALIST

## 2024-11-13 PROCEDURE — 99214 OFFICE O/P EST MOD 30 MIN: CPT | Performed by: CLINICAL NURSE SPECIALIST

## 2024-11-13 PROCEDURE — 3078F DIAST BP <80 MM HG: CPT | Performed by: CLINICAL NURSE SPECIALIST

## 2024-11-13 PROCEDURE — G8484 FLU IMMUNIZE NO ADMIN: HCPCS | Performed by: CLINICAL NURSE SPECIALIST

## 2024-11-13 PROCEDURE — G8427 DOCREV CUR MEDS BY ELIG CLIN: HCPCS | Performed by: CLINICAL NURSE SPECIALIST

## 2024-11-13 RX ORDER — METOPROLOL SUCCINATE 25 MG/1
25 TABLET, EXTENDED RELEASE ORAL DAILY
Qty: 90 TABLET | Refills: 2 | Status: SHIPPED | OUTPATIENT
Start: 2024-11-13

## 2024-11-13 RX ORDER — SPIRONOLACTONE 25 MG/1
12.5 TABLET ORAL DAILY
Qty: 45 TABLET | Refills: 1 | Status: SHIPPED | OUTPATIENT
Start: 2024-11-13

## 2024-11-13 RX ORDER — GABAPENTIN 100 MG/1
200 CAPSULE ORAL 3 TIMES DAILY
COMMUNITY
Start: 2024-10-23 | End: 2025-01-21

## 2024-11-13 NOTE — PROGRESS NOTES
11/13/24 1307   BP: 110/74   Pulse: 81   SpO2: 100%   Weight: 71.2 kg (157 lb)   Height: 1.575 m (5' 2\")            Constitutional and General Appearance: Warm and dry, no apparent distress, normal coloration  HEENT:  Normocephalic, atraumatic  Respiratory:  Normal excursion and expansion without use of accessory muscles  Resp Auscultation: Normal breath sounds without dullness  Cardiovascular:  The apical impulses not displaced  Heart tones are crisp and normal  JVP normal cm H2O  Regular rate and rhythm  Peripheral pulses are symmetrical and full  There is no clubbing, cyanosis of the extremities.  Slight ankle edema bilaterally  Pedal Pulses: 2+ and equal   Abdomen:  No masses or tenderness  Liver/Spleen: No Abnormalities Noted  Neurological/Psychiatric:  Alert and oriented in all spheres  Moves all extremities well  Exhibits normal gait balance and coordination  No abnormalities of mood, affect, memory, mentation, or behavior are noted    Lab Data:    CBC:   Lab Results   Component Value Date/Time    WBC 4.5 02/05/2023 06:41 AM    WBC 8.6 05/26/2021 06:02 AM    WBC 7.8 05/25/2021 04:32 AM    RBC 3.77 02/05/2023 06:41 AM    RBC 3.96 05/26/2021 06:02 AM    RBC 3.69 05/25/2021 04:32 AM    HGB 13.3 02/05/2023 06:41 AM    HGB 12.8 05/26/2021 06:02 AM    HGB 11.9 05/25/2021 04:32 AM    HCT 39.0 02/05/2023 06:41 AM    HCT 39.2 05/26/2021 06:02 AM    HCT 36.0 05/25/2021 04:32 AM    .5 02/05/2023 06:41 AM    MCV 99.1 05/26/2021 06:02 AM    MCV 97.5 05/25/2021 04:32 AM    RDW 13.7 02/05/2023 06:41 AM    RDW 15.3 05/26/2021 06:02 AM    RDW 14.9 05/25/2021 04:32 AM     02/05/2023 06:41 AM     05/26/2021 06:02 AM     05/25/2021 04:32 AM     BMP:  Lab Results   Component Value Date/Time     06/18/2024 10:57 AM     01/23/2024 11:42 AM     06/22/2023 11:26 AM    K 3.8 06/18/2024 10:57 AM    K 4.0 01/23/2024 11:42 AM    K 4.2 06/22/2023 11:26 AM    K 3.7 05/23/2021 09:10 PM    K 2.6

## 2024-11-13 NOTE — PATIENT INSTRUCTIONS
Start jardiance 10 mg once a day keep privates clean  Continue all other medications  Blood work in 2-3 weeks  Refills done  RTO in 4-5 months

## 2025-04-07 ENCOUNTER — RESULTS FOLLOW-UP (OUTPATIENT)
Dept: CARDIOLOGY CLINIC | Age: 50
End: 2025-04-07

## 2025-04-07 ENCOUNTER — OFFICE VISIT (OUTPATIENT)
Dept: CARDIOLOGY CLINIC | Age: 50
End: 2025-04-07
Payer: COMMERCIAL

## 2025-04-07 ENCOUNTER — HOSPITAL ENCOUNTER (OUTPATIENT)
Age: 50
Discharge: HOME | End: 2025-04-07
Payer: COMMERCIAL

## 2025-04-07 VITALS
HEART RATE: 94 BPM | HEIGHT: 62 IN | WEIGHT: 156 LBS | DIASTOLIC BLOOD PRESSURE: 90 MMHG | BODY MASS INDEX: 28.71 KG/M2 | SYSTOLIC BLOOD PRESSURE: 120 MMHG | OXYGEN SATURATION: 99 %

## 2025-04-07 DIAGNOSIS — I42.0 DILATED CARDIOMYOPATHY (HCC): ICD-10-CM

## 2025-04-07 DIAGNOSIS — I10 ESSENTIAL (PRIMARY) HYPERTENSION: ICD-10-CM

## 2025-04-07 DIAGNOSIS — E55.9 VITAMIN D DEFICIENCY: ICD-10-CM

## 2025-04-07 DIAGNOSIS — I50.22 CHRONIC SYSTOLIC CONGESTIVE HEART FAILURE (HCC): Primary | ICD-10-CM

## 2025-04-07 DIAGNOSIS — I50.22 CHRONIC SYSTOLIC CONGESTIVE HEART FAILURE (HCC): ICD-10-CM

## 2025-04-07 LAB
25(OH)D3 SERPL-MCNC: 7.3 NG/ML
ANION GAP SERPL CALCULATED.3IONS-SCNC: 8 MMOL/L (ref 3–16)
BUN SERPL-MCNC: 15 MG/DL (ref 7–20)
CALCIUM SERPL-MCNC: 9.6 MG/DL (ref 8.3–10.6)
CHLORIDE SERPL-SCNC: 102 MMOL/L (ref 99–110)
CO2 SERPL-SCNC: 28 MMOL/L (ref 21–32)
CREAT SERPL-MCNC: 0.7 MG/DL (ref 0.6–1.1)
GFR SERPLBLD CREATININE-BSD FMLA CKD-EPI: >90 ML/MIN/{1.73_M2}
GLUCOSE SERPL-MCNC: 95 MG/DL (ref 70–99)
NT-PROBNP SERPL-MCNC: <36 PG/ML (ref 0–124)
POTASSIUM SERPL-SCNC: 4 MMOL/L (ref 3.5–5.1)
SODIUM SERPL-SCNC: 138 MMOL/L (ref 136–145)
TSH SERPL DL<=0.005 MIU/L-ACNC: 0.67 UIU/ML (ref 0.27–4.2)

## 2025-04-07 PROCEDURE — 82306 VITAMIN D 25 HYDROXY: CPT

## 2025-04-07 PROCEDURE — 3080F DIAST BP >= 90 MM HG: CPT | Performed by: CLINICAL NURSE SPECIALIST

## 2025-04-07 PROCEDURE — 80048 BASIC METABOLIC PNL TOTAL CA: CPT

## 2025-04-07 PROCEDURE — 83880 ASSAY OF NATRIURETIC PEPTIDE: CPT

## 2025-04-07 PROCEDURE — 84443 ASSAY THYROID STIM HORMONE: CPT

## 2025-04-07 PROCEDURE — 36415 COLL VENOUS BLD VENIPUNCTURE: CPT

## 2025-04-07 PROCEDURE — 3074F SYST BP LT 130 MM HG: CPT | Performed by: CLINICAL NURSE SPECIALIST

## 2025-04-07 PROCEDURE — 99214 OFFICE O/P EST MOD 30 MIN: CPT | Performed by: CLINICAL NURSE SPECIALIST

## 2025-04-07 PROCEDURE — G8417 CALC BMI ABV UP PARAM F/U: HCPCS | Performed by: CLINICAL NURSE SPECIALIST

## 2025-04-07 PROCEDURE — G8427 DOCREV CUR MEDS BY ELIG CLIN: HCPCS | Performed by: CLINICAL NURSE SPECIALIST

## 2025-04-07 PROCEDURE — 1036F TOBACCO NON-USER: CPT | Performed by: CLINICAL NURSE SPECIALIST

## 2025-04-07 RX ORDER — ERGOCALCIFEROL 1.25 MG/1
50000 CAPSULE, LIQUID FILLED ORAL WEEKLY
Qty: 12 CAPSULE | Refills: 1 | Status: SHIPPED | OUTPATIENT
Start: 2025-04-07

## 2025-04-07 RX ORDER — CLONAZEPAM 0.5 MG/1
0.5 TABLET, ORALLY DISINTEGRATING ORAL DAILY
COMMUNITY

## 2025-04-07 NOTE — TELEPHONE ENCOUNTER
----- Message from ALEXIS Gonzalez - CNS sent at 4/7/2025  4:09 PM EDT -----  Blood work is good except for her vitamin d is very low 7.3   She needs to stop taking the over counter vitamin d and  start the vitamin d 90525 once a week that I sent to her pharmacy  Thanks    Spoke to patient she verbalized understanding.

## 2025-04-07 NOTE — PROGRESS NOTES
Take 2 capsules by mouth 3 times daily. 10/23/24 4/7/25 Yes ProviderAmy MD   metoprolol succinate (TOPROL XL) 25 MG extended release tablet Take 1 tablet by mouth daily 11/13/24  Yes Stephanie Hunter APRN - CNS   sacubitril-valsartan (ENTRESTO) 24-26 MG per tablet Take 1 tablet by mouth 2 times daily 11/13/24  Yes Stephanie Hunter APRN - CNS   spironolactone (ALDACTONE) 25 MG tablet Take 0.5 tablets by mouth daily 11/13/24  Yes Stephanie Hunter APRN - CNS   vitamin D3 (CHOLECALCIFEROL) 25 MCG (1000 UT) TABS tablet Take 2 tablets by mouth daily 6/18/24  Yes Stephanie Hunter APRN - CNS   buPROPion (WELLBUTRIN XL) 300 MG extended release tablet Take 1 tablet by mouth every morning   Yes Provider, MD Amy        Allergies:  Shellfish-derived products and Penicillins     Review of Systems:   Constitutional: there has been no unanticipated weight loss. There's been no change in energy level, sleep pattern, or activity level.     Eyes: No visual changes or diplopia. No scleral icterus.  ENT: No Headaches, hearing loss or vertigo. No mouth sores or sore throat.  Cardiovascular: Reviewed in HPI  Respiratory: No cough or wheezing, no sputum production. No hematemesis.    Gastrointestinal: No abdominal pain, appetite loss, blood in stools. No change in bowel or bladder habits.  Genitourinary: No dysuria, trouble voiding, or hematuria.  Musculoskeletal:  No gait disturbance, weakness or joint complaints.  Integumentary: No rash or pruritis.  Neurological: No headache, diplopia, change in muscle strength, numbness or tingling. No change in gait, balance, coordination, mood, affect, memory, mentation, behavior.  Psychiatric: No anxiety, no depression.  Endocrine: No malaise, fatigue or temperature intolerance. No excessive thirst, fluid intake, or urination. No tremor.  Hematologic/Lymphatic: No abnormal bruising or bleeding, blood clots or swollen lymph nodes.  Allergic/Immunologic: No nasal

## 2025-05-09 ENCOUNTER — HOSPITAL ENCOUNTER (OUTPATIENT)
Age: 50
Discharge: HOME | End: 2025-05-11
Payer: COMMERCIAL

## 2025-05-09 ENCOUNTER — RESULTS FOLLOW-UP (OUTPATIENT)
Dept: CARDIOLOGY CLINIC | Age: 50
End: 2025-05-09

## 2025-05-09 VITALS
WEIGHT: 156 LBS | DIASTOLIC BLOOD PRESSURE: 89 MMHG | HEIGHT: 62 IN | SYSTOLIC BLOOD PRESSURE: 130 MMHG | BODY MASS INDEX: 28.71 KG/M2

## 2025-05-09 DIAGNOSIS — I50.22 CHRONIC SYSTOLIC CONGESTIVE HEART FAILURE (HCC): ICD-10-CM

## 2025-05-09 LAB
ECHO AO ASC DIAM: 3.2 CM
ECHO AO ASCENDING AORTA INDEX: 1.86 CM/M2
ECHO AO ROOT DIAM: 3.3 CM
ECHO AO ROOT INDEX: 1.92 CM/M2
ECHO AV AREA PEAK VELOCITY: 2.4 CM2
ECHO AV AREA VTI: 2.7 CM2
ECHO AV AREA/BSA PEAK VELOCITY: 1.4 CM2/M2
ECHO AV AREA/BSA VTI: 1.6 CM2/M2
ECHO AV MEAN GRADIENT: 3 MMHG
ECHO AV MEAN VELOCITY: 0.9 M/S
ECHO AV PEAK GRADIENT: 6 MMHG
ECHO AV PEAK VELOCITY: 1.2 M/S
ECHO AV VELOCITY RATIO: 0.75
ECHO AV VTI: 22.5 CM
ECHO BSA: 1.76 M2
ECHO EST RA PRESSURE: 3 MMHG
ECHO LA AREA 2C: 14.9 CM2
ECHO LA AREA 4C: 16.5 CM2
ECHO LA DIAMETER INDEX: 1.8 CM/M2
ECHO LA DIAMETER: 3.1 CM
ECHO LA MAJOR AXIS: 5.2 CM
ECHO LA MINOR AXIS: 4.6 CM
ECHO LA TO AORTIC ROOT RATIO: 0.94
ECHO LA VOL BP: 42 ML (ref 22–52)
ECHO LA VOL MOD A2C: 37 ML (ref 22–52)
ECHO LA VOL MOD A4C: 43 ML (ref 22–52)
ECHO LA VOL/BSA BIPLANE: 24 ML/M2 (ref 16–34)
ECHO LA VOLUME INDEX MOD A2C: 22 ML/M2 (ref 16–34)
ECHO LA VOLUME INDEX MOD A4C: 25 ML/M2 (ref 16–34)
ECHO LV E' LATERAL VELOCITY: 8.92 CM/S
ECHO LV E' SEPTAL VELOCITY: 6.53 CM/S
ECHO LV EDV A2C: 86 ML
ECHO LV EDV A4C: 87 ML
ECHO LV EDV INDEX A4C: 51 ML/M2
ECHO LV EDV NDEX A2C: 50 ML/M2
ECHO LV EF PHYSICIAN: 50 %
ECHO LV EJECTION FRACTION A2C: 40 %
ECHO LV EJECTION FRACTION A4C: 52 %
ECHO LV ESV A2C: 52 ML
ECHO LV ESV A4C: 41 ML
ECHO LV ESV INDEX A2C: 30 ML/M2
ECHO LV ESV INDEX A4C: 24 ML/M2
ECHO LV FRACTIONAL SHORTENING: 25 % (ref 28–44)
ECHO LV INTERNAL DIMENSION DIASTOLE INDEX: 2.97 CM/M2
ECHO LV INTERNAL DIMENSION DIASTOLIC: 5.1 CM (ref 3.9–5.3)
ECHO LV INTERNAL DIMENSION SYSTOLIC INDEX: 2.21 CM/M2
ECHO LV INTERNAL DIMENSION SYSTOLIC: 3.8 CM
ECHO LV IVSD: 0.7 CM (ref 0.6–0.9)
ECHO LV MASS 2D: 129.4 G (ref 67–162)
ECHO LV MASS INDEX 2D: 75.3 G/M2 (ref 43–95)
ECHO LV POSTERIOR WALL DIASTOLIC: 0.8 CM (ref 0.6–0.9)
ECHO LV RELATIVE WALL THICKNESS RATIO: 0.31
ECHO LVOT AREA: 3.1 CM2
ECHO LVOT AV VTI INDEX: 0.85
ECHO LVOT DIAM: 2 CM
ECHO LVOT MEAN GRADIENT: 2 MMHG
ECHO LVOT PEAK GRADIENT: 4 MMHG
ECHO LVOT PEAK VELOCITY: 0.9 M/S
ECHO LVOT STROKE VOLUME INDEX: 35.1 ML/M2
ECHO LVOT SV: 60.3 ML
ECHO LVOT VTI: 19.2 CM
ECHO MV A VELOCITY: 0.81 M/S
ECHO MV E VELOCITY: 0.82 M/S
ECHO MV E/A RATIO: 1.01
ECHO MV E/E' LATERAL: 9.19
ECHO MV E/E' RATIO (AVERAGED): 10.88
ECHO MV E/E' SEPTAL: 12.56
ECHO PULMONARY ARTERY END DIASTOLIC PRESSURE: 5 MMHG
ECHO PV MAX VELOCITY: 0.8 M/S
ECHO PV PEAK GRADIENT: 3 MMHG
ECHO PV REGURGITANT MAX VELOCITY: 1.1 M/S
ECHO RA AREA 4C: 10.6 CM2
ECHO RA END SYSTOLIC VOLUME APICAL 4 CHAMBER INDEX BSA: 12 ML/M2
ECHO RA VOLUME: 21 ML
ECHO RIGHT VENTRICULAR SYSTOLIC PRESSURE (RVSP): 20 MMHG
ECHO RV BASAL DIMENSION: 3.1 CM
ECHO RV FREE WALL PEAK S': 13.8 CM/S
ECHO RV LONGITUDINAL DIMENSION: 7.2 CM
ECHO RV MID DIMENSION: 2.2 CM
ECHO RV TAPSE: 2.2 CM (ref 1.7–?)
ECHO TV REGURGITANT MAX VELOCITY: 2.05 M/S
ECHO TV REGURGITANT PEAK GRADIENT: 17 MMHG

## 2025-05-09 PROCEDURE — 93306 TTE W/DOPPLER COMPLETE: CPT | Performed by: INTERNAL MEDICINE

## 2025-05-09 PROCEDURE — 93306 TTE W/DOPPLER COMPLETE: CPT

## 2025-06-16 RX ORDER — SACUBITRIL AND VALSARTAN 24; 26 MG/1; MG/1
1 TABLET, FILM COATED ORAL 2 TIMES DAILY
Qty: 180 TABLET | Refills: 0 | Status: SHIPPED | OUTPATIENT
Start: 2025-06-16

## 2025-06-16 RX ORDER — SPIRONOLACTONE 25 MG/1
TABLET ORAL
Qty: 45 TABLET | Refills: 0 | Status: SHIPPED | OUTPATIENT
Start: 2025-06-16

## 2025-07-21 RX ORDER — ERGOCALCIFEROL 1.25 MG/1
50000 CAPSULE, LIQUID FILLED ORAL WEEKLY
Qty: 12 CAPSULE | Refills: 1 | Status: SHIPPED | OUTPATIENT
Start: 2025-07-21